# Patient Record
Sex: MALE | Race: BLACK OR AFRICAN AMERICAN | Employment: OTHER | URBAN - METROPOLITAN AREA
[De-identification: names, ages, dates, MRNs, and addresses within clinical notes are randomized per-mention and may not be internally consistent; named-entity substitution may affect disease eponyms.]

---

## 2017-02-06 ENCOUNTER — OFFICE VISIT (OUTPATIENT)
Dept: INTERNAL MEDICINE CLINIC | Age: 67
End: 2017-02-06

## 2017-02-06 VITALS
DIASTOLIC BLOOD PRESSURE: 64 MMHG | OXYGEN SATURATION: 98 % | WEIGHT: 159 LBS | HEIGHT: 71 IN | SYSTOLIC BLOOD PRESSURE: 124 MMHG | BODY MASS INDEX: 22.26 KG/M2 | TEMPERATURE: 98.1 F | HEART RATE: 100 BPM

## 2017-02-06 DIAGNOSIS — M79.641 HAND PAIN, RIGHT: ICD-10-CM

## 2017-02-06 DIAGNOSIS — M54.41 CHRONIC BILATERAL LOW BACK PAIN WITH RIGHT-SIDED SCIATICA: ICD-10-CM

## 2017-02-06 DIAGNOSIS — I10 ESSENTIAL HYPERTENSION: Primary | ICD-10-CM

## 2017-02-06 DIAGNOSIS — G89.29 CHRONIC BILATERAL LOW BACK PAIN WITH RIGHT-SIDED SCIATICA: ICD-10-CM

## 2017-02-06 DIAGNOSIS — D50.9 IRON DEFICIENCY ANEMIA, UNSPECIFIED IRON DEFICIENCY ANEMIA TYPE: ICD-10-CM

## 2017-02-06 DIAGNOSIS — M54.2 NECK PAIN: ICD-10-CM

## 2017-02-06 PROCEDURE — 99214 OFFICE O/P EST MOD 30 MIN: CPT | Performed by: INTERNAL MEDICINE

## 2017-02-06 RX ORDER — VALSARTAN AND HYDROCHLOROTHIAZIDE 320; 25 MG/1; MG/1
TABLET, FILM COATED ORAL
Qty: 30 TABLET | Refills: 5 | Status: SHIPPED | OUTPATIENT
Start: 2017-02-06 | End: 2017-05-16 | Stop reason: SDUPTHER

## 2017-02-06 RX ORDER — VALSARTAN AND HYDROCHLOROTHIAZIDE 320; 25 MG/1; MG/1
TABLET, FILM COATED ORAL
Qty: 30 TABLET | Refills: 5 | Status: SHIPPED | OUTPATIENT
Start: 2017-02-06 | End: 2017-02-06 | Stop reason: SDUPTHER

## 2017-02-06 RX ORDER — DIAZEPAM 10 MG/1
10 TABLET ORAL EVERY 8 HOURS PRN
Qty: 60 TABLET | Refills: 5 | Status: SHIPPED | OUTPATIENT
Start: 2017-02-06 | End: 2017-08-10 | Stop reason: SDUPTHER

## 2017-02-06 ASSESSMENT — ENCOUNTER SYMPTOMS
EYES NEGATIVE: 1
GASTROINTESTINAL NEGATIVE: 1
RESPIRATORY NEGATIVE: 1

## 2017-02-07 LAB
BASOPHILS ABSOLUTE: 0.1 K/UL (ref 0–0.2)
BASOPHILS RELATIVE PERCENT: 1 %
EOSINOPHILS ABSOLUTE: 0.2 K/UL (ref 0–0.6)
EOSINOPHILS RELATIVE PERCENT: 2.3 %
HCT VFR BLD CALC: 42.6 % (ref 40.5–52.5)
HEMOGLOBIN: 14.2 G/DL (ref 13.5–17.5)
LYMPHOCYTES ABSOLUTE: 3.4 K/UL (ref 1–5.1)
LYMPHOCYTES RELATIVE PERCENT: 51.2 %
MCH RBC QN AUTO: 31.4 PG (ref 26–34)
MCHC RBC AUTO-ENTMCNC: 33.4 G/DL (ref 31–36)
MCV RBC AUTO: 94.3 FL (ref 80–100)
MONOCYTES ABSOLUTE: 0.8 K/UL (ref 0–1.3)
MONOCYTES RELATIVE PERCENT: 12.2 %
NEUTROPHILS ABSOLUTE: 2.2 K/UL (ref 1.7–7.7)
NEUTROPHILS RELATIVE PERCENT: 33.3 %
PDW BLD-RTO: 13.3 % (ref 12.4–15.4)
PLATELET # BLD: 209 K/UL (ref 135–450)
PMV BLD AUTO: 11.1 FL (ref 5–10.5)
RBC # BLD: 4.52 M/UL (ref 4.2–5.9)
WBC # BLD: 6.7 K/UL (ref 4–11)

## 2017-05-10 ENCOUNTER — OFFICE VISIT (OUTPATIENT)
Dept: INTERNAL MEDICINE CLINIC | Age: 67
End: 2017-05-10

## 2017-05-10 VITALS
HEART RATE: 115 BPM | WEIGHT: 155 LBS | DIASTOLIC BLOOD PRESSURE: 66 MMHG | SYSTOLIC BLOOD PRESSURE: 100 MMHG | BODY MASS INDEX: 21.7 KG/M2 | OXYGEN SATURATION: 97 % | TEMPERATURE: 98.5 F | HEIGHT: 71 IN

## 2017-05-10 DIAGNOSIS — G89.29 CHRONIC BILATERAL LOW BACK PAIN WITH RIGHT-SIDED SCIATICA: ICD-10-CM

## 2017-05-10 DIAGNOSIS — R79.89 ELEVATED LFTS: ICD-10-CM

## 2017-05-10 DIAGNOSIS — M54.41 CHRONIC BILATERAL LOW BACK PAIN WITH RIGHT-SIDED SCIATICA: ICD-10-CM

## 2017-05-10 DIAGNOSIS — M54.2 NECK PAIN: ICD-10-CM

## 2017-05-10 DIAGNOSIS — Z23 NEED FOR VACCINATION: Primary | ICD-10-CM

## 2017-05-10 DIAGNOSIS — N18.2 CKD (CHRONIC KIDNEY DISEASE), STAGE 2 (MILD): ICD-10-CM

## 2017-05-10 DIAGNOSIS — K59.01 SLOW TRANSIT CONSTIPATION: ICD-10-CM

## 2017-05-10 DIAGNOSIS — M79.641 HAND PAIN, RIGHT: ICD-10-CM

## 2017-05-10 DIAGNOSIS — I10 ESSENTIAL HYPERTENSION: ICD-10-CM

## 2017-05-10 LAB
ALBUMIN SERPL-MCNC: 4.2 G/DL (ref 3.4–5)
ANION GAP SERPL CALCULATED.3IONS-SCNC: 17 MMOL/L (ref 3–16)
BUN BLDV-MCNC: 37 MG/DL (ref 7–20)
CALCIUM SERPL-MCNC: 9.7 MG/DL (ref 8.3–10.6)
CHLORIDE BLD-SCNC: 104 MMOL/L (ref 99–110)
CO2: 22 MMOL/L (ref 21–32)
CREAT SERPL-MCNC: 2 MG/DL (ref 0.8–1.3)
GFR AFRICAN AMERICAN: 40
GFR NON-AFRICAN AMERICAN: 33
GLUCOSE BLD-MCNC: 94 MG/DL (ref 70–99)
PHOSPHORUS: 3.3 MG/DL (ref 2.5–4.9)
POTASSIUM SERPL-SCNC: 4.3 MMOL/L (ref 3.5–5.1)
SODIUM BLD-SCNC: 143 MMOL/L (ref 136–145)

## 2017-05-10 PROCEDURE — 90670 PCV13 VACCINE IM: CPT | Performed by: INTERNAL MEDICINE

## 2017-05-10 PROCEDURE — 96372 THER/PROPH/DIAG INJ SC/IM: CPT | Performed by: INTERNAL MEDICINE

## 2017-05-10 PROCEDURE — G0009 ADMIN PNEUMOCOCCAL VACCINE: HCPCS | Performed by: INTERNAL MEDICINE

## 2017-05-10 PROCEDURE — 99214 OFFICE O/P EST MOD 30 MIN: CPT | Performed by: INTERNAL MEDICINE

## 2017-05-10 RX ORDER — TRIAMCINOLONE ACETONIDE 40 MG/ML
40 INJECTION, SUSPENSION INTRA-ARTICULAR; INTRAMUSCULAR ONCE
Status: COMPLETED | OUTPATIENT
Start: 2017-05-10 | End: 2017-05-10

## 2017-05-10 RX ADMIN — TRIAMCINOLONE ACETONIDE 40 MG: 40 INJECTION, SUSPENSION INTRA-ARTICULAR; INTRAMUSCULAR at 15:06

## 2017-05-10 ASSESSMENT — PATIENT HEALTH QUESTIONNAIRE - PHQ9
2. FEELING DOWN, DEPRESSED OR HOPELESS: 0
SUM OF ALL RESPONSES TO PHQ9 QUESTIONS 1 & 2: 0
1. LITTLE INTEREST OR PLEASURE IN DOING THINGS: 0
SUM OF ALL RESPONSES TO PHQ QUESTIONS 1-9: 0

## 2017-05-11 LAB — HEPATITIS C ANTIBODY INTERPRETATION: REACTIVE

## 2017-05-17 RX ORDER — VALSARTAN AND HYDROCHLOROTHIAZIDE 320; 25 MG/1; MG/1
TABLET, FILM COATED ORAL
Qty: 30 TABLET | Refills: 5 | Status: SHIPPED | OUTPATIENT
Start: 2017-05-17 | End: 2019-01-15 | Stop reason: ALTCHOICE

## 2017-06-08 ENCOUNTER — TELEPHONE (OUTPATIENT)
Dept: INTERNAL MEDICINE CLINIC | Age: 67
End: 2017-06-08

## 2017-08-10 ENCOUNTER — TELEPHONE (OUTPATIENT)
Dept: INTERNAL MEDICINE CLINIC | Age: 67
End: 2017-08-10

## 2017-08-14 RX ORDER — DIAZEPAM 10 MG/1
10 TABLET ORAL EVERY 8 HOURS PRN
Qty: 60 TABLET | Refills: 5 | Status: SHIPPED | OUTPATIENT
Start: 2017-08-14 | End: 2018-02-13 | Stop reason: SDUPTHER

## 2017-08-15 ENCOUNTER — TELEPHONE (OUTPATIENT)
Dept: INTERNAL MEDICINE CLINIC | Age: 67
End: 2017-08-15

## 2017-09-07 ENCOUNTER — OFFICE VISIT (OUTPATIENT)
Dept: INTERNAL MEDICINE CLINIC | Age: 67
End: 2017-09-07

## 2017-09-07 VITALS
SYSTOLIC BLOOD PRESSURE: 110 MMHG | TEMPERATURE: 98.1 F | WEIGHT: 158.8 LBS | DIASTOLIC BLOOD PRESSURE: 60 MMHG | BODY MASS INDEX: 22.23 KG/M2 | HEART RATE: 104 BPM | HEIGHT: 71 IN | OXYGEN SATURATION: 97 %

## 2017-09-07 DIAGNOSIS — B18.2 HEP C W/O COMA, CHRONIC (HCC): ICD-10-CM

## 2017-09-07 DIAGNOSIS — M79.641 HAND PAIN, RIGHT: ICD-10-CM

## 2017-09-07 DIAGNOSIS — R79.89 ELEVATED LFTS: Primary | ICD-10-CM

## 2017-09-07 DIAGNOSIS — M54.2 NECK PAIN: ICD-10-CM

## 2017-09-07 DIAGNOSIS — I10 ESSENTIAL HYPERTENSION: ICD-10-CM

## 2017-09-07 LAB
A/G RATIO: 1.3 (ref 1.1–2.2)
ALBUMIN SERPL-MCNC: 4 G/DL (ref 3.4–5)
ALP BLD-CCNC: 136 U/L (ref 40–129)
ALT SERPL-CCNC: 25 U/L (ref 10–40)
ANION GAP SERPL CALCULATED.3IONS-SCNC: 19 MMOL/L (ref 3–16)
AST SERPL-CCNC: 33 U/L (ref 15–37)
BILIRUB SERPL-MCNC: <0.2 MG/DL (ref 0–1)
BUN BLDV-MCNC: 36 MG/DL (ref 7–20)
CALCIUM SERPL-MCNC: 9.6 MG/DL (ref 8.3–10.6)
CHLORIDE BLD-SCNC: 104 MMOL/L (ref 99–110)
CO2: 19 MMOL/L (ref 21–32)
CREAT SERPL-MCNC: 1.5 MG/DL (ref 0.8–1.3)
GFR AFRICAN AMERICAN: 56
GFR NON-AFRICAN AMERICAN: 46
GLOBULIN: 3.1 G/DL
GLUCOSE BLD-MCNC: 129 MG/DL (ref 70–99)
POTASSIUM SERPL-SCNC: 4 MMOL/L (ref 3.5–5.1)
SODIUM BLD-SCNC: 142 MMOL/L (ref 136–145)
TOTAL PROTEIN: 7.1 G/DL (ref 6.4–8.2)

## 2017-09-07 PROCEDURE — 99214 OFFICE O/P EST MOD 30 MIN: CPT | Performed by: INTERNAL MEDICINE

## 2017-09-07 ASSESSMENT — PATIENT HEALTH QUESTIONNAIRE - PHQ9
SUM OF ALL RESPONSES TO PHQ QUESTIONS 1-9: 0
SUM OF ALL RESPONSES TO PHQ9 QUESTIONS 1 & 2: 0
2. FEELING DOWN, DEPRESSED OR HOPELESS: 0
1. LITTLE INTEREST OR PLEASURE IN DOING THINGS: 0

## 2017-09-07 ASSESSMENT — ENCOUNTER SYMPTOMS
EYES NEGATIVE: 1
GASTROINTESTINAL NEGATIVE: 1
RESPIRATORY NEGATIVE: 1

## 2017-12-27 ENCOUNTER — OFFICE VISIT (OUTPATIENT)
Dept: INTERNAL MEDICINE CLINIC | Age: 67
End: 2017-12-27

## 2017-12-27 VITALS
DIASTOLIC BLOOD PRESSURE: 78 MMHG | WEIGHT: 164 LBS | HEIGHT: 71 IN | SYSTOLIC BLOOD PRESSURE: 104 MMHG | BODY MASS INDEX: 22.96 KG/M2 | HEART RATE: 118 BPM | OXYGEN SATURATION: 92 %

## 2017-12-27 DIAGNOSIS — R53.83 FATIGUE, UNSPECIFIED TYPE: ICD-10-CM

## 2017-12-27 DIAGNOSIS — Z23 NEED FOR VACCINATION: ICD-10-CM

## 2017-12-27 DIAGNOSIS — M54.50 ACUTE BILATERAL LOW BACK PAIN WITHOUT SCIATICA: ICD-10-CM

## 2017-12-27 DIAGNOSIS — G62.9 NEUROPATHY: ICD-10-CM

## 2017-12-27 DIAGNOSIS — I10 ESSENTIAL HYPERTENSION: ICD-10-CM

## 2017-12-27 DIAGNOSIS — M79.641 HAND PAIN, RIGHT: ICD-10-CM

## 2017-12-27 DIAGNOSIS — M54.2 NECK PAIN: Primary | ICD-10-CM

## 2017-12-27 LAB
A/G RATIO: 1 (ref 1.1–2.2)
ALBUMIN SERPL-MCNC: 4.3 G/DL (ref 3.4–5)
ALP BLD-CCNC: 116 U/L (ref 40–129)
ALT SERPL-CCNC: 35 U/L (ref 10–40)
ANION GAP SERPL CALCULATED.3IONS-SCNC: 17 MMOL/L (ref 3–16)
AST SERPL-CCNC: 51 U/L (ref 15–37)
BILIRUB SERPL-MCNC: 0.6 MG/DL (ref 0–1)
BUN BLDV-MCNC: 41 MG/DL (ref 7–20)
CALCIUM SERPL-MCNC: 10.2 MG/DL (ref 8.3–10.6)
CHLORIDE BLD-SCNC: 104 MMOL/L (ref 99–110)
CO2: 22 MMOL/L (ref 21–32)
CREAT SERPL-MCNC: 2.3 MG/DL (ref 0.8–1.3)
GFR AFRICAN AMERICAN: 34
GFR NON-AFRICAN AMERICAN: 28
GLOBULIN: 4.1 G/DL
GLUCOSE BLD-MCNC: 110 MG/DL (ref 70–99)
POTASSIUM SERPL-SCNC: 4.3 MMOL/L (ref 3.5–5.1)
SODIUM BLD-SCNC: 143 MMOL/L (ref 136–145)
TOTAL PROTEIN: 8.4 G/DL (ref 6.4–8.2)

## 2017-12-27 PROCEDURE — 3017F COLORECTAL CA SCREEN DOC REV: CPT | Performed by: INTERNAL MEDICINE

## 2017-12-27 PROCEDURE — 4040F PNEUMOC VAC/ADMIN/RCVD: CPT | Performed by: INTERNAL MEDICINE

## 2017-12-27 PROCEDURE — G0008 ADMIN INFLUENZA VIRUS VAC: HCPCS | Performed by: INTERNAL MEDICINE

## 2017-12-27 PROCEDURE — 1123F ACP DISCUSS/DSCN MKR DOCD: CPT | Performed by: INTERNAL MEDICINE

## 2017-12-27 PROCEDURE — 96372 THER/PROPH/DIAG INJ SC/IM: CPT | Performed by: INTERNAL MEDICINE

## 2017-12-27 PROCEDURE — 99214 OFFICE O/P EST MOD 30 MIN: CPT | Performed by: INTERNAL MEDICINE

## 2017-12-27 PROCEDURE — G8420 CALC BMI NORM PARAMETERS: HCPCS | Performed by: INTERNAL MEDICINE

## 2017-12-27 PROCEDURE — 4004F PT TOBACCO SCREEN RCVD TLK: CPT | Performed by: INTERNAL MEDICINE

## 2017-12-27 PROCEDURE — G8484 FLU IMMUNIZE NO ADMIN: HCPCS | Performed by: INTERNAL MEDICINE

## 2017-12-27 PROCEDURE — G8427 DOCREV CUR MEDS BY ELIG CLIN: HCPCS | Performed by: INTERNAL MEDICINE

## 2017-12-27 PROCEDURE — 90662 IIV NO PRSV INCREASED AG IM: CPT | Performed by: INTERNAL MEDICINE

## 2017-12-27 RX ORDER — NAPROXEN SODIUM 550 MG/1
550 TABLET ORAL 2 TIMES DAILY WITH MEALS
Qty: 60 TABLET | Refills: 0 | Status: SHIPPED | OUTPATIENT
Start: 2017-12-27 | End: 2018-06-28

## 2017-12-27 ASSESSMENT — ENCOUNTER SYMPTOMS
GASTROINTESTINAL NEGATIVE: 1
EYES NEGATIVE: 1
BACK PAIN: 1
RESPIRATORY NEGATIVE: 1

## 2017-12-27 NOTE — PROGRESS NOTES
Vaccine Information Sheet, \"Influenza - Inactivated\"  given to Reyna Knightrs., or parent/legal guardian of  Reyna Weaver. and verbalized understanding. Patient responses:    Have you ever had a reaction to a flu vaccine? NO  Are you able to eat eggs without adverse effects? NO  Do you have any current illness? NO  Have you ever had Guillian [place] Syndrome? NO    Flu vaccine given per order. Please see immunization tab.

## 2018-02-13 ENCOUNTER — TELEPHONE (OUTPATIENT)
Dept: INTERNAL MEDICINE CLINIC | Age: 68
End: 2018-02-13

## 2018-02-13 RX ORDER — DIAZEPAM 10 MG/1
TABLET ORAL
Qty: 60 TABLET | Refills: 4 | Status: SHIPPED | OUTPATIENT
Start: 2018-02-13 | End: 2018-03-15

## 2018-02-14 ENCOUNTER — TELEPHONE (OUTPATIENT)
Dept: INTERNAL MEDICINE CLINIC | Age: 68
End: 2018-02-14

## 2018-02-14 NOTE — TELEPHONE ENCOUNTER
spoke with haim at pharmacy he said pt needs prescription refilled for diazapram pts number is [phone]

## 2018-02-19 RX ORDER — VALSARTAN AND HYDROCHLOROTHIAZIDE 320; 25 MG/1; MG/1
TABLET, FILM COATED ORAL
Qty: 30 TABLET | Refills: 5 | Status: SHIPPED | OUTPATIENT
Start: 2018-02-19 | End: 2018-06-28 | Stop reason: SDUPTHER

## 2018-03-27 ENCOUNTER — OFFICE VISIT (OUTPATIENT)
Dept: INTERNAL MEDICINE CLINIC | Age: 68
End: 2018-03-27

## 2018-03-27 VITALS
HEIGHT: 71 IN | BODY MASS INDEX: 22.96 KG/M2 | DIASTOLIC BLOOD PRESSURE: 84 MMHG | OXYGEN SATURATION: 97 % | HEART RATE: 87 BPM | SYSTOLIC BLOOD PRESSURE: 124 MMHG | WEIGHT: 164 LBS

## 2018-03-27 DIAGNOSIS — Z12.5 PROSTATE CANCER SCREENING: ICD-10-CM

## 2018-03-27 DIAGNOSIS — M54.41 CHRONIC BILATERAL LOW BACK PAIN WITH RIGHT-SIDED SCIATICA: ICD-10-CM

## 2018-03-27 DIAGNOSIS — D50.9 IRON DEFICIENCY ANEMIA, UNSPECIFIED IRON DEFICIENCY ANEMIA TYPE: ICD-10-CM

## 2018-03-27 DIAGNOSIS — G62.9 NEUROPATHY: ICD-10-CM

## 2018-03-27 DIAGNOSIS — M54.2 NECK PAIN: ICD-10-CM

## 2018-03-27 DIAGNOSIS — R79.89 ELEVATED LFTS: ICD-10-CM

## 2018-03-27 DIAGNOSIS — G89.29 CHRONIC BILATERAL LOW BACK PAIN WITH RIGHT-SIDED SCIATICA: ICD-10-CM

## 2018-03-27 DIAGNOSIS — I10 ESSENTIAL HYPERTENSION: Primary | ICD-10-CM

## 2018-03-27 LAB
A/G RATIO: 1.3 (ref 1.1–2.2)
ALBUMIN SERPL-MCNC: 4 G/DL (ref 3.4–5)
ALP BLD-CCNC: 149 U/L (ref 40–129)
ALT SERPL-CCNC: 21 U/L (ref 10–40)
ANION GAP SERPL CALCULATED.3IONS-SCNC: 15 MMOL/L (ref 3–16)
AST SERPL-CCNC: 27 U/L (ref 15–37)
BILIRUB SERPL-MCNC: <0.2 MG/DL (ref 0–1)
BUN BLDV-MCNC: 45 MG/DL (ref 7–20)
CALCIUM SERPL-MCNC: 8.8 MG/DL (ref 8.3–10.6)
CHLORIDE BLD-SCNC: 105 MMOL/L (ref 99–110)
CO2: 22 MMOL/L (ref 21–32)
CREAT SERPL-MCNC: 1.8 MG/DL (ref 0.8–1.3)
GFR AFRICAN AMERICAN: 45
GFR NON-AFRICAN AMERICAN: 38
GLOBULIN: 3 G/DL
GLUCOSE BLD-MCNC: 126 MG/DL (ref 70–99)
HCT VFR BLD CALC: 43.2 % (ref 40.5–52.5)
HEMOGLOBIN: 14.5 G/DL (ref 13.5–17.5)
MCH RBC QN AUTO: 32.2 PG (ref 26–34)
MCHC RBC AUTO-ENTMCNC: 33.7 G/DL (ref 31–36)
MCV RBC AUTO: 95.6 FL (ref 80–100)
PDW BLD-RTO: 12.7 % (ref 12.4–15.4)
PLATELET # BLD: 179 K/UL (ref 135–450)
PMV BLD AUTO: 11.7 FL (ref 5–10.5)
POTASSIUM SERPL-SCNC: 4.2 MMOL/L (ref 3.5–5.1)
PROSTATE SPECIFIC ANTIGEN: 0.95 NG/ML (ref 0–4)
RBC # BLD: 4.51 M/UL (ref 4.2–5.9)
SODIUM BLD-SCNC: 142 MMOL/L (ref 136–145)
TOTAL PROTEIN: 7 G/DL (ref 6.4–8.2)
WBC # BLD: 5.1 K/UL (ref 4–11)

## 2018-03-27 PROCEDURE — G8427 DOCREV CUR MEDS BY ELIG CLIN: HCPCS | Performed by: INTERNAL MEDICINE

## 2018-03-27 PROCEDURE — 3017F COLORECTAL CA SCREEN DOC REV: CPT | Performed by: INTERNAL MEDICINE

## 2018-03-27 PROCEDURE — 4004F PT TOBACCO SCREEN RCVD TLK: CPT | Performed by: INTERNAL MEDICINE

## 2018-03-27 PROCEDURE — 99214 OFFICE O/P EST MOD 30 MIN: CPT | Performed by: INTERNAL MEDICINE

## 2018-03-27 PROCEDURE — G8482 FLU IMMUNIZE ORDER/ADMIN: HCPCS | Performed by: INTERNAL MEDICINE

## 2018-03-27 PROCEDURE — 1123F ACP DISCUSS/DSCN MKR DOCD: CPT | Performed by: INTERNAL MEDICINE

## 2018-03-27 PROCEDURE — 4040F PNEUMOC VAC/ADMIN/RCVD: CPT | Performed by: INTERNAL MEDICINE

## 2018-03-27 PROCEDURE — G8420 CALC BMI NORM PARAMETERS: HCPCS | Performed by: INTERNAL MEDICINE

## 2018-03-27 PROCEDURE — 96372 THER/PROPH/DIAG INJ SC/IM: CPT | Performed by: INTERNAL MEDICINE

## 2018-03-27 RX ORDER — CYANOCOBALAMIN 1000 UG/ML
1000 INJECTION INTRAMUSCULAR; SUBCUTANEOUS ONCE
Status: COMPLETED | OUTPATIENT
Start: 2018-03-27 | End: 2018-03-27

## 2018-03-27 RX ADMIN — CYANOCOBALAMIN 1000 MCG: 1000 INJECTION INTRAMUSCULAR; SUBCUTANEOUS at 15:44

## 2018-03-27 ASSESSMENT — ENCOUNTER SYMPTOMS
RESPIRATORY NEGATIVE: 1
GASTROINTESTINAL NEGATIVE: 1
EYES NEGATIVE: 1

## 2018-03-27 NOTE — PROGRESS NOTES
murmur heard. Pulmonary/Chest: Effort normal and breath sounds normal. No respiratory distress. He has no wheezes. He has no rales. Abdominal: Soft. Bowel sounds are normal.   Musculoskeletal: Normal range of motion. Neurological: He is alert and oriented to person, place, and time. Skin: Skin is warm and dry. Psychiatric: He has a normal mood and affect.        Assessment:    hypertension  Stable    Hyperlipidemia  On diet    Chronic anemia  Stable    Chronic neck pain    Acute sinusitis      Plan:      Continue meds    Refills    Labs    B!@    toradol 60mg IM    Return in 3 months

## 2018-05-21 ENCOUNTER — TELEPHONE (OUTPATIENT)
Dept: INTERNAL MEDICINE CLINIC | Age: 68
End: 2018-05-21

## 2018-05-22 ENCOUNTER — TELEPHONE (OUTPATIENT)
Dept: INTERNAL MEDICINE CLINIC | Age: 68
End: 2018-05-22

## 2018-05-22 RX ORDER — DIPHENOXYLATE HYDROCHLORIDE AND ATROPINE SULFATE 2.5; .025 MG/1; MG/1
1 TABLET ORAL 4 TIMES DAILY PRN
Qty: 30 TABLET | Refills: 0 | Status: SHIPPED | OUTPATIENT
Start: 2018-05-22 | End: 2018-06-01

## 2018-06-28 ENCOUNTER — OFFICE VISIT (OUTPATIENT)
Dept: INTERNAL MEDICINE CLINIC | Age: 68
End: 2018-06-28

## 2018-06-28 VITALS
SYSTOLIC BLOOD PRESSURE: 100 MMHG | HEIGHT: 71 IN | BODY MASS INDEX: 21.84 KG/M2 | OXYGEN SATURATION: 98 % | WEIGHT: 156 LBS | DIASTOLIC BLOOD PRESSURE: 74 MMHG | HEART RATE: 95 BPM

## 2018-06-28 DIAGNOSIS — F51.01 PRIMARY INSOMNIA: ICD-10-CM

## 2018-06-28 DIAGNOSIS — M54.50 ACUTE BILATERAL LOW BACK PAIN WITHOUT SCIATICA: ICD-10-CM

## 2018-06-28 DIAGNOSIS — I10 ESSENTIAL HYPERTENSION: Primary | ICD-10-CM

## 2018-06-28 DIAGNOSIS — M54.2 NECK PAIN: ICD-10-CM

## 2018-06-28 DIAGNOSIS — F17.200 CURRENT SMOKER: ICD-10-CM

## 2018-06-28 DIAGNOSIS — R53.83 FATIGUE, UNSPECIFIED TYPE: ICD-10-CM

## 2018-06-28 LAB
A/G RATIO: 1.1 (ref 1.1–2.2)
ALBUMIN SERPL-MCNC: 4 G/DL (ref 3.4–5)
ALP BLD-CCNC: 145 U/L (ref 40–129)
ALT SERPL-CCNC: <5 U/L (ref 10–40)
ANION GAP SERPL CALCULATED.3IONS-SCNC: 18 MMOL/L (ref 3–16)
AST SERPL-CCNC: <5 U/L (ref 15–37)
BILIRUB SERPL-MCNC: 0.3 MG/DL (ref 0–1)
BUN BLDV-MCNC: 45 MG/DL (ref 7–20)
CALCIUM SERPL-MCNC: 9.6 MG/DL (ref 8.3–10.6)
CHLORIDE BLD-SCNC: 101 MMOL/L (ref 99–110)
CO2: 22 MMOL/L (ref 21–32)
CREAT SERPL-MCNC: 1.9 MG/DL (ref 0.8–1.3)
GFR AFRICAN AMERICAN: 43
GFR NON-AFRICAN AMERICAN: 35
GLOBULIN: 3.5 G/DL
GLUCOSE BLD-MCNC: 102 MG/DL (ref 70–99)
POTASSIUM SERPL-SCNC: 4.5 MMOL/L (ref 3.5–5.1)
SODIUM BLD-SCNC: 141 MMOL/L (ref 136–145)
TOTAL PROTEIN: 7.5 G/DL (ref 6.4–8.2)

## 2018-06-28 PROCEDURE — G8427 DOCREV CUR MEDS BY ELIG CLIN: HCPCS | Performed by: INTERNAL MEDICINE

## 2018-06-28 PROCEDURE — 1123F ACP DISCUSS/DSCN MKR DOCD: CPT | Performed by: INTERNAL MEDICINE

## 2018-06-28 PROCEDURE — 4004F PT TOBACCO SCREEN RCVD TLK: CPT | Performed by: INTERNAL MEDICINE

## 2018-06-28 PROCEDURE — 4040F PNEUMOC VAC/ADMIN/RCVD: CPT | Performed by: INTERNAL MEDICINE

## 2018-06-28 PROCEDURE — G8420 CALC BMI NORM PARAMETERS: HCPCS | Performed by: INTERNAL MEDICINE

## 2018-06-28 PROCEDURE — 3017F COLORECTAL CA SCREEN DOC REV: CPT | Performed by: INTERNAL MEDICINE

## 2018-06-28 PROCEDURE — 96372 THER/PROPH/DIAG INJ SC/IM: CPT | Performed by: INTERNAL MEDICINE

## 2018-06-28 PROCEDURE — 99214 OFFICE O/P EST MOD 30 MIN: CPT | Performed by: INTERNAL MEDICINE

## 2018-06-28 RX ORDER — KETOROLAC TROMETHAMINE 30 MG/ML
30 INJECTION, SOLUTION INTRAMUSCULAR; INTRAVENOUS ONCE
Status: COMPLETED | OUTPATIENT
Start: 2018-06-28 | End: 2018-06-28

## 2018-06-28 RX ORDER — DIAZEPAM 10 MG/1
TABLET ORAL
Refills: 4 | COMMUNITY
Start: 2018-06-12 | End: 2018-07-16 | Stop reason: SDUPTHER

## 2018-06-28 RX ORDER — SILDENAFIL 100 MG/1
100 TABLET, FILM COATED ORAL PRN
Qty: 3 TABLET | Refills: 3 | Status: SHIPPED | OUTPATIENT
Start: 2018-06-28 | End: 2019-04-16 | Stop reason: SDUPTHER

## 2018-06-28 RX ORDER — NICOTINE 21 MG/24HR
1 PATCH, TRANSDERMAL 24 HOURS TRANSDERMAL EVERY 24 HOURS
Qty: 30 PATCH | Refills: 3 | Status: SHIPPED | OUTPATIENT
Start: 2018-06-28 | End: 2020-01-16 | Stop reason: SDUPTHER

## 2018-06-28 RX ORDER — VALSARTAN AND HYDROCHLOROTHIAZIDE 320; 25 MG/1; MG/1
TABLET, FILM COATED ORAL
Qty: 30 TABLET | Refills: 5 | Status: SHIPPED | OUTPATIENT
Start: 2018-06-28 | End: 2019-01-15 | Stop reason: ALTCHOICE

## 2018-06-28 RX ADMIN — KETOROLAC TROMETHAMINE 30 MG: 30 INJECTION, SOLUTION INTRAMUSCULAR; INTRAVENOUS at 14:19

## 2018-06-28 ASSESSMENT — ENCOUNTER SYMPTOMS
RESPIRATORY NEGATIVE: 1
GASTROINTESTINAL NEGATIVE: 1
EYES NEGATIVE: 1

## 2018-06-28 ASSESSMENT — PATIENT HEALTH QUESTIONNAIRE - PHQ9
SUM OF ALL RESPONSES TO PHQ9 QUESTIONS 1 & 2: 0
2. FEELING DOWN, DEPRESSED OR HOPELESS: 0
SUM OF ALL RESPONSES TO PHQ QUESTIONS 1-9: 0
1. LITTLE INTEREST OR PLEASURE IN DOING THINGS: 0

## 2018-07-16 DIAGNOSIS — F51.01 PRIMARY INSOMNIA: ICD-10-CM

## 2018-07-16 DIAGNOSIS — F51.01 PRIMARY INSOMNIA: Primary | ICD-10-CM

## 2018-07-16 RX ORDER — DIAZEPAM 10 MG/1
10 TABLET ORAL EVERY 12 HOURS PRN
Qty: 60 TABLET | Refills: 5 | Status: SHIPPED | OUTPATIENT
Start: 2018-07-16 | End: 2018-07-16 | Stop reason: SDUPTHER

## 2018-07-17 ENCOUNTER — TELEPHONE (OUTPATIENT)
Dept: INTERNAL MEDICINE CLINIC | Age: 68
End: 2018-07-17

## 2018-07-17 RX ORDER — DIAZEPAM 10 MG/1
TABLET ORAL
Qty: 60 TABLET | Refills: 3 | Status: SHIPPED | OUTPATIENT
Start: 2018-07-17 | End: 2018-07-23 | Stop reason: SDUPTHER

## 2018-07-23 ENCOUNTER — TELEPHONE (OUTPATIENT)
Dept: INTERNAL MEDICINE CLINIC | Age: 68
End: 2018-07-23

## 2018-07-23 DIAGNOSIS — F51.01 PRIMARY INSOMNIA: ICD-10-CM

## 2018-07-23 DIAGNOSIS — F41.1 GENERALIZED ANXIETY DISORDER: ICD-10-CM

## 2018-07-23 DIAGNOSIS — G62.9 NEUROPATHY: Primary | ICD-10-CM

## 2018-07-23 RX ORDER — LOSARTAN POTASSIUM AND HYDROCHLOROTHIAZIDE 25; 100 MG/1; MG/1
1 TABLET ORAL DAILY
Qty: 30 TABLET | Refills: 3 | Status: SHIPPED | OUTPATIENT
Start: 2018-07-23 | End: 2018-11-17 | Stop reason: SDUPTHER

## 2018-07-23 RX ORDER — DIAZEPAM 10 MG/1
10 TABLET ORAL EVERY 12 HOURS PRN
Qty: 60 TABLET | Refills: 3 | Status: SHIPPED | OUTPATIENT
Start: 2018-07-23 | End: 2018-08-22

## 2018-07-23 NOTE — TELEPHONE ENCOUNTER
Pharmacist calling requesting an alternative be prescribed     By the  For Briseyda [place] 320-25 MG

## 2018-08-14 ENCOUNTER — TELEPHONE (OUTPATIENT)
Dept: INTERNAL MEDICINE CLINIC | Age: 68
End: 2018-08-14

## 2018-10-02 ENCOUNTER — OFFICE VISIT (OUTPATIENT)
Dept: INTERNAL MEDICINE CLINIC | Age: 68
End: 2018-10-02
Payer: MEDICARE

## 2018-10-02 VITALS
OXYGEN SATURATION: 99 % | DIASTOLIC BLOOD PRESSURE: 60 MMHG | BODY MASS INDEX: 21.42 KG/M2 | HEART RATE: 90 BPM | SYSTOLIC BLOOD PRESSURE: 96 MMHG | HEIGHT: 71 IN | WEIGHT: 153 LBS

## 2018-10-02 DIAGNOSIS — M54.2 NECK PAIN: ICD-10-CM

## 2018-10-02 DIAGNOSIS — G89.29 CHRONIC BILATERAL LOW BACK PAIN WITH RIGHT-SIDED SCIATICA: ICD-10-CM

## 2018-10-02 DIAGNOSIS — D50.9 IRON DEFICIENCY ANEMIA, UNSPECIFIED IRON DEFICIENCY ANEMIA TYPE: ICD-10-CM

## 2018-10-02 DIAGNOSIS — I10 ESSENTIAL HYPERTENSION: Primary | ICD-10-CM

## 2018-10-02 DIAGNOSIS — M54.41 CHRONIC BILATERAL LOW BACK PAIN WITH RIGHT-SIDED SCIATICA: ICD-10-CM

## 2018-10-02 DIAGNOSIS — M79.641 HAND PAIN, RIGHT: ICD-10-CM

## 2018-10-02 PROCEDURE — G8484 FLU IMMUNIZE NO ADMIN: HCPCS | Performed by: INTERNAL MEDICINE

## 2018-10-02 PROCEDURE — 4040F PNEUMOC VAC/ADMIN/RCVD: CPT | Performed by: INTERNAL MEDICINE

## 2018-10-02 PROCEDURE — G8427 DOCREV CUR MEDS BY ELIG CLIN: HCPCS | Performed by: INTERNAL MEDICINE

## 2018-10-02 PROCEDURE — 1123F ACP DISCUSS/DSCN MKR DOCD: CPT | Performed by: INTERNAL MEDICINE

## 2018-10-02 PROCEDURE — 4004F PT TOBACCO SCREEN RCVD TLK: CPT | Performed by: INTERNAL MEDICINE

## 2018-10-02 PROCEDURE — G8420 CALC BMI NORM PARAMETERS: HCPCS | Performed by: INTERNAL MEDICINE

## 2018-10-02 PROCEDURE — 99214 OFFICE O/P EST MOD 30 MIN: CPT | Performed by: INTERNAL MEDICINE

## 2018-10-02 PROCEDURE — 1101F PT FALLS ASSESS-DOCD LE1/YR: CPT | Performed by: INTERNAL MEDICINE

## 2018-10-02 PROCEDURE — 3017F COLORECTAL CA SCREEN DOC REV: CPT | Performed by: INTERNAL MEDICINE

## 2018-10-02 PROCEDURE — 96372 THER/PROPH/DIAG INJ SC/IM: CPT | Performed by: INTERNAL MEDICINE

## 2018-10-02 RX ORDER — KETOROLAC TROMETHAMINE 30 MG/ML
30 INJECTION, SOLUTION INTRAMUSCULAR; INTRAVENOUS ONCE
Status: COMPLETED | OUTPATIENT
Start: 2018-10-02 | End: 2018-10-02

## 2018-10-02 RX ORDER — CYANOCOBALAMIN 1000 UG/ML
1000 INJECTION INTRAMUSCULAR; SUBCUTANEOUS ONCE
Status: COMPLETED | OUTPATIENT
Start: 2018-10-02 | End: 2018-10-02

## 2018-10-02 RX ORDER — DIAZEPAM 10 MG/1
TABLET ORAL
Refills: 3 | COMMUNITY
Start: 2018-09-21 | End: 2018-11-21 | Stop reason: SDUPTHER

## 2018-10-02 RX ORDER — INFLUENZA A VIRUS A/MICHIGAN/45/2015 X-275 (H1N1) ANTIGEN (FORMALDEHYDE INACTIVATED), INFLUENZA A VIRUS A/SINGAPORE/INFIMH-16-0019/2016 IVR-186 (H3N2) ANTIGEN (FORMALDEHYDE INACTIVATED), AND INFLUENZA B VIRUS B/MARYLAND/15/2016 BX-69A (A B/COLORADO/6/2017-LIKE VIRUS) ANTIGEN (FORMALDEHYDE INACTIVATED) 60; 60; 60 UG/.5ML; UG/.5ML; UG/.5ML
INJECTION, SUSPENSION INTRAMUSCULAR
Refills: 0 | Status: ON HOLD | COMMUNITY
Start: 2018-09-25 | End: 2020-01-08 | Stop reason: HOSPADM

## 2018-10-02 RX ADMIN — KETOROLAC TROMETHAMINE 30 MG: 30 INJECTION, SOLUTION INTRAMUSCULAR; INTRAVENOUS at 14:55

## 2018-10-02 RX ADMIN — CYANOCOBALAMIN 1000 MCG: 1000 INJECTION INTRAMUSCULAR; SUBCUTANEOUS at 14:54

## 2018-10-02 ASSESSMENT — PATIENT HEALTH QUESTIONNAIRE - PHQ9
SUM OF ALL RESPONSES TO PHQ9 QUESTIONS 1 & 2: 0
2. FEELING DOWN, DEPRESSED OR HOPELESS: 0
SUM OF ALL RESPONSES TO PHQ QUESTIONS 1-9: 0
1. LITTLE INTEREST OR PLEASURE IN DOING THINGS: 0
SUM OF ALL RESPONSES TO PHQ QUESTIONS 1-9: 0

## 2018-10-02 ASSESSMENT — ENCOUNTER SYMPTOMS
EYES NEGATIVE: 1
GASTROINTESTINAL NEGATIVE: 1
RESPIRATORY NEGATIVE: 1

## 2018-10-02 NOTE — PROGRESS NOTES
Subjective:      Patient ID: Manjula Gonzalez is a 70 y.o. male. Hypertension   This is a chronic problem. The problem is controlled. Associated symptoms include anxiety and neck pain. Pertinent negatives include no chest pain. There are no associated agents to hypertension. Risk factors for coronary artery disease include family history and male gender. Past treatments include angiotensin blockers, lifestyle changes and diuretics. The current treatment provides significant improvement. There are no compliance problems. There is no history of CAD/MI. Hand Pain    There was no injury mechanism. The pain is present in the left hand and right hand. The quality of the pain is described as aching. The pain is at a severity of 4/10. The pain is moderate. The pain has been intermittent since the incident. Pertinent negatives include no chest pain. Nothing aggravates the symptoms. The treatment provided moderate relief. Neck Pain    This is a chronic problem. The problem occurs daily. The problem has been unchanged. The quality of the pain is described as aching. The pain is at a severity of 5/10. The pain is moderate. Pertinent negatives include no chest pain. Review of Systems   Constitutional: Negative. HENT: Negative. Eyes: Negative. Respiratory: Negative. Cardiovascular: Negative. Negative for chest pain. Gastrointestinal: Negative. Genitourinary: Negative. Musculoskeletal: Positive for neck pain. Skin: Negative. Neurological: Negative. Psychiatric/Behavioral: Negative. Objective:   Physical Exam   Constitutional: He is oriented to person, place, and time. He appears well-developed and well-nourished. HENT:   Head: Normocephalic and atraumatic. Left Ear: External ear normal.   Eyes: Pupils are equal, round, and reactive to light. Conjunctivae and EOM are normal.   Neck: Normal range of motion. Neck supple. No thyromegaly present.    Cardiovascular: Normal rate,

## 2018-11-20 RX ORDER — LOSARTAN POTASSIUM AND HYDROCHLOROTHIAZIDE 25; 100 MG/1; MG/1
TABLET ORAL
Qty: 30 TABLET | Refills: 3 | Status: SHIPPED | OUTPATIENT
Start: 2018-11-20 | End: 2019-03-14 | Stop reason: SDUPTHER

## 2018-11-21 DIAGNOSIS — G62.9 NEUROPATHY: Primary | ICD-10-CM

## 2018-11-21 DIAGNOSIS — M54.2 NECK PAIN: ICD-10-CM

## 2018-11-21 DIAGNOSIS — F41.1 GENERALIZED ANXIETY DISORDER: ICD-10-CM

## 2018-11-21 RX ORDER — DIAZEPAM 10 MG/1
10 TABLET ORAL EVERY 12 HOURS PRN
Qty: 60 TABLET | Refills: 3 | Status: SHIPPED | OUTPATIENT
Start: 2018-11-21 | End: 2019-01-15 | Stop reason: SDUPTHER

## 2019-01-02 ENCOUNTER — TELEPHONE (OUTPATIENT)
Dept: INTERNAL MEDICINE CLINIC | Age: 69
End: 2019-01-02

## 2019-01-15 ENCOUNTER — OFFICE VISIT (OUTPATIENT)
Dept: INTERNAL MEDICINE CLINIC | Age: 69
End: 2019-01-15
Payer: MEDICARE

## 2019-01-15 VITALS
OXYGEN SATURATION: 97 % | WEIGHT: 157 LBS | HEIGHT: 71 IN | SYSTOLIC BLOOD PRESSURE: 100 MMHG | HEART RATE: 108 BPM | DIASTOLIC BLOOD PRESSURE: 70 MMHG | BODY MASS INDEX: 21.98 KG/M2

## 2019-01-15 DIAGNOSIS — I10 ESSENTIAL HYPERTENSION: ICD-10-CM

## 2019-01-15 DIAGNOSIS — M54.41 CHRONIC BILATERAL LOW BACK PAIN WITH RIGHT-SIDED SCIATICA: ICD-10-CM

## 2019-01-15 DIAGNOSIS — M54.2 NECK PAIN: ICD-10-CM

## 2019-01-15 DIAGNOSIS — Z23 NEED FOR PROPHYLACTIC VACCINATION AGAINST DIPHTHERIA-TETANUS-PERTUSSIS (DTP): Primary | ICD-10-CM

## 2019-01-15 DIAGNOSIS — G89.29 CHRONIC BILATERAL LOW BACK PAIN WITH RIGHT-SIDED SCIATICA: ICD-10-CM

## 2019-01-15 DIAGNOSIS — M79.641 HAND PAIN, RIGHT: ICD-10-CM

## 2019-01-15 DIAGNOSIS — G62.9 NEUROPATHY: ICD-10-CM

## 2019-01-15 DIAGNOSIS — F41.1 GENERALIZED ANXIETY DISORDER: ICD-10-CM

## 2019-01-15 PROCEDURE — 96372 THER/PROPH/DIAG INJ SC/IM: CPT | Performed by: INTERNAL MEDICINE

## 2019-01-15 PROCEDURE — 1123F ACP DISCUSS/DSCN MKR DOCD: CPT | Performed by: INTERNAL MEDICINE

## 2019-01-15 PROCEDURE — G8482 FLU IMMUNIZE ORDER/ADMIN: HCPCS | Performed by: INTERNAL MEDICINE

## 2019-01-15 PROCEDURE — 4004F PT TOBACCO SCREEN RCVD TLK: CPT | Performed by: INTERNAL MEDICINE

## 2019-01-15 PROCEDURE — G8427 DOCREV CUR MEDS BY ELIG CLIN: HCPCS | Performed by: INTERNAL MEDICINE

## 2019-01-15 PROCEDURE — G8420 CALC BMI NORM PARAMETERS: HCPCS | Performed by: INTERNAL MEDICINE

## 2019-01-15 PROCEDURE — G0008 ADMIN INFLUENZA VIRUS VAC: HCPCS | Performed by: INTERNAL MEDICINE

## 2019-01-15 PROCEDURE — 3017F COLORECTAL CA SCREEN DOC REV: CPT | Performed by: INTERNAL MEDICINE

## 2019-01-15 PROCEDURE — 4040F PNEUMOC VAC/ADMIN/RCVD: CPT | Performed by: INTERNAL MEDICINE

## 2019-01-15 PROCEDURE — 1101F PT FALLS ASSESS-DOCD LE1/YR: CPT | Performed by: INTERNAL MEDICINE

## 2019-01-15 PROCEDURE — 99214 OFFICE O/P EST MOD 30 MIN: CPT | Performed by: INTERNAL MEDICINE

## 2019-01-15 PROCEDURE — 90662 IIV NO PRSV INCREASED AG IM: CPT | Performed by: INTERNAL MEDICINE

## 2019-01-15 RX ORDER — KETOROLAC TROMETHAMINE 30 MG/ML
30 INJECTION, SOLUTION INTRAMUSCULAR; INTRAVENOUS ONCE
Status: COMPLETED | OUTPATIENT
Start: 2019-01-15 | End: 2019-01-15

## 2019-01-15 RX ORDER — DIAZEPAM 10 MG/1
10 TABLET ORAL EVERY 12 HOURS PRN
Qty: 60 TABLET | Refills: 3 | Status: SHIPPED | OUTPATIENT
Start: 2019-01-15 | End: 2019-02-14

## 2019-01-15 RX ADMIN — KETOROLAC TROMETHAMINE 30 MG: 30 INJECTION, SOLUTION INTRAMUSCULAR; INTRAVENOUS at 12:07

## 2019-01-15 ASSESSMENT — PATIENT HEALTH QUESTIONNAIRE - PHQ9
1. LITTLE INTEREST OR PLEASURE IN DOING THINGS: 0
SUM OF ALL RESPONSES TO PHQ QUESTIONS 1-9: 0
SUM OF ALL RESPONSES TO PHQ QUESTIONS 1-9: 0
SUM OF ALL RESPONSES TO PHQ9 QUESTIONS 1 & 2: 0
2. FEELING DOWN, DEPRESSED OR HOPELESS: 0

## 2019-01-15 ASSESSMENT — ENCOUNTER SYMPTOMS
SHORTNESS OF BREATH: 1
GASTROINTESTINAL NEGATIVE: 1
EYES NEGATIVE: 1

## 2019-03-14 RX ORDER — LOSARTAN POTASSIUM AND HYDROCHLOROTHIAZIDE 25; 100 MG/1; MG/1
TABLET ORAL
Qty: 30 TABLET | Refills: 3 | Status: SHIPPED | OUTPATIENT
Start: 2019-03-14 | End: 2019-06-09 | Stop reason: SDUPTHER

## 2019-04-16 ENCOUNTER — OFFICE VISIT (OUTPATIENT)
Dept: INTERNAL MEDICINE CLINIC | Age: 69
End: 2019-04-16
Payer: MEDICARE

## 2019-04-16 VITALS
DIASTOLIC BLOOD PRESSURE: 68 MMHG | WEIGHT: 160 LBS | OXYGEN SATURATION: 97 % | BODY MASS INDEX: 22.4 KG/M2 | RESPIRATION RATE: 14 BRPM | HEIGHT: 71 IN | SYSTOLIC BLOOD PRESSURE: 116 MMHG | HEART RATE: 102 BPM

## 2019-04-16 DIAGNOSIS — E78.2 MIXED HYPERLIPIDEMIA: ICD-10-CM

## 2019-04-16 DIAGNOSIS — D50.9 IRON DEFICIENCY ANEMIA, UNSPECIFIED IRON DEFICIENCY ANEMIA TYPE: ICD-10-CM

## 2019-04-16 DIAGNOSIS — M79.642 PAIN IN BOTH HANDS: ICD-10-CM

## 2019-04-16 DIAGNOSIS — M79.641 PAIN IN BOTH HANDS: ICD-10-CM

## 2019-04-16 DIAGNOSIS — I10 ESSENTIAL HYPERTENSION: Primary | ICD-10-CM

## 2019-04-16 DIAGNOSIS — I10 ESSENTIAL HYPERTENSION: ICD-10-CM

## 2019-04-16 LAB
A/G RATIO: 1.1 (ref 1.1–2.2)
ALBUMIN SERPL-MCNC: 4.1 G/DL (ref 3.4–5)
ALP BLD-CCNC: 139 U/L (ref 40–129)
ALT SERPL-CCNC: 45 U/L (ref 10–40)
ANION GAP SERPL CALCULATED.3IONS-SCNC: 14 MMOL/L (ref 3–16)
AST SERPL-CCNC: 50 U/L (ref 15–37)
BILIRUB SERPL-MCNC: 0.4 MG/DL (ref 0–1)
BUN BLDV-MCNC: 51 MG/DL (ref 7–20)
CALCIUM SERPL-MCNC: 9.8 MG/DL (ref 8.3–10.6)
CHLORIDE BLD-SCNC: 107 MMOL/L (ref 99–110)
CHOLESTEROL, TOTAL: 131 MG/DL (ref 0–199)
CO2: 20 MMOL/L (ref 21–32)
CREAT SERPL-MCNC: 2.3 MG/DL (ref 0.8–1.3)
GFR AFRICAN AMERICAN: 34
GFR NON-AFRICAN AMERICAN: 28
GLOBULIN: 3.7 G/DL
GLUCOSE BLD-MCNC: 129 MG/DL (ref 70–99)
HCT VFR BLD CALC: 41.7 % (ref 40.5–52.5)
HDLC SERPL-MCNC: 32 MG/DL (ref 40–60)
HEMOGLOBIN: 13.9 G/DL (ref 13.5–17.5)
LDL CHOLESTEROL CALCULATED: ABNORMAL MG/DL
LDL CHOLESTEROL DIRECT: 33 MG/DL
MCH RBC QN AUTO: 32 PG (ref 26–34)
MCHC RBC AUTO-ENTMCNC: 33.4 G/DL (ref 31–36)
MCV RBC AUTO: 95.7 FL (ref 80–100)
PDW BLD-RTO: 13.1 % (ref 12.4–15.4)
PLATELET # BLD: 185 K/UL (ref 135–450)
PMV BLD AUTO: 11.7 FL (ref 5–10.5)
POTASSIUM SERPL-SCNC: 4.5 MMOL/L (ref 3.5–5.1)
RBC # BLD: 4.35 M/UL (ref 4.2–5.9)
SODIUM BLD-SCNC: 141 MMOL/L (ref 136–145)
TOTAL PROTEIN: 7.8 G/DL (ref 6.4–8.2)
TRIGL SERPL-MCNC: 497 MG/DL (ref 0–150)
VLDLC SERPL CALC-MCNC: ABNORMAL MG/DL
WBC # BLD: 4.7 K/UL (ref 4–11)

## 2019-04-16 PROCEDURE — 4040F PNEUMOC VAC/ADMIN/RCVD: CPT | Performed by: INTERNAL MEDICINE

## 2019-04-16 PROCEDURE — 96372 THER/PROPH/DIAG INJ SC/IM: CPT | Performed by: INTERNAL MEDICINE

## 2019-04-16 PROCEDURE — 4004F PT TOBACCO SCREEN RCVD TLK: CPT | Performed by: INTERNAL MEDICINE

## 2019-04-16 PROCEDURE — 99214 OFFICE O/P EST MOD 30 MIN: CPT | Performed by: INTERNAL MEDICINE

## 2019-04-16 PROCEDURE — G8420 CALC BMI NORM PARAMETERS: HCPCS | Performed by: INTERNAL MEDICINE

## 2019-04-16 PROCEDURE — G8427 DOCREV CUR MEDS BY ELIG CLIN: HCPCS | Performed by: INTERNAL MEDICINE

## 2019-04-16 PROCEDURE — 3017F COLORECTAL CA SCREEN DOC REV: CPT | Performed by: INTERNAL MEDICINE

## 2019-04-16 PROCEDURE — 1123F ACP DISCUSS/DSCN MKR DOCD: CPT | Performed by: INTERNAL MEDICINE

## 2019-04-16 RX ORDER — CYANOCOBALAMIN 1000 UG/ML
1000 INJECTION INTRAMUSCULAR; SUBCUTANEOUS ONCE
Status: COMPLETED | OUTPATIENT
Start: 2019-04-16 | End: 2019-04-16

## 2019-04-16 RX ORDER — TRIAMCINOLONE ACETONIDE 40 MG/ML
40 INJECTION, SUSPENSION INTRA-ARTICULAR; INTRAMUSCULAR ONCE
Status: COMPLETED | OUTPATIENT
Start: 2019-04-16 | End: 2019-04-16

## 2019-04-16 RX ORDER — SILDENAFIL 100 MG/1
100 TABLET, FILM COATED ORAL PRN
Qty: 30 TABLET | Refills: 3 | Status: SHIPPED | OUTPATIENT
Start: 2019-04-16 | End: 2020-01-23 | Stop reason: ALTCHOICE

## 2019-04-16 RX ORDER — DIAZEPAM 10 MG/1
TABLET ORAL
Refills: 3 | COMMUNITY
Start: 2019-03-23 | End: 2019-05-25 | Stop reason: SDUPTHER

## 2019-04-16 RX ADMIN — CYANOCOBALAMIN 1000 MCG: 1000 INJECTION INTRAMUSCULAR; SUBCUTANEOUS at 14:42

## 2019-04-16 RX ADMIN — TRIAMCINOLONE ACETONIDE 40 MG: 40 INJECTION, SUSPENSION INTRA-ARTICULAR; INTRAMUSCULAR at 14:44

## 2019-04-16 ASSESSMENT — ENCOUNTER SYMPTOMS
EYES NEGATIVE: 1
RESPIRATORY NEGATIVE: 1
BACK PAIN: 1
GASTROINTESTINAL NEGATIVE: 1

## 2019-04-16 NOTE — PROGRESS NOTES
Subjective:      Patient ID: Alex Peres is a 78 y.o. male. Hypertension   This is a chronic problem. The problem is unchanged. The problem is controlled. Associated symptoms include neck pain. Pertinent negatives include no headaches. There are no associated agents to hypertension. Risk factors for coronary artery disease include family history and male gender. Past treatments include lifestyle changes, calcium channel blockers and angiotensin blockers. The current treatment provides significant improvement. There are no compliance problems. There is no history of CAD/MI. Neck Pain    This is a chronic problem. The problem occurs daily. The problem has been unchanged. The pain is associated with an unknown factor. The pain is present in the anterior neck. The quality of the pain is described as aching. The pain is at a severity of 4/10. The pain is severe. Nothing aggravates the symptoms. The pain is same all the time. Stiffness is present at night. Associated symptoms include tingling. Pertinent negatives include no headaches. He has tried bed rest for the symptoms. The treatment provided moderate relief. Hand Pain    The incident occurred at home. The pain is present in the left hand and right hand. The quality of the pain is described as aching. The pain is at a severity of 4/10. The pain is moderate. The pain has been intermittent since the incident. Associated symptoms include tingling. He has tried acetaminophen and NSAIDs for the symptoms. The treatment provided moderate relief. Review of Systems   Constitutional: Negative. HENT: Negative. Eyes: Negative. Respiratory: Negative. Cardiovascular: Negative. Gastrointestinal: Negative. Genitourinary: Negative. Musculoskeletal: Positive for arthralgias, back pain and neck pain. Skin: Negative. Neurological: Positive for tingling. Negative for headaches. Psychiatric/Behavioral: Negative.         Objective:   Physical Exam   Constitutional: He is oriented to person, place, and time. He appears well-developed and well-nourished. HENT:   Head: Normocephalic and atraumatic. Left Ear: External ear normal.   Eyes: Pupils are equal, round, and reactive to light. Conjunctivae and EOM are normal.   Neck: Normal range of motion. Neck supple. No thyromegaly present. Cardiovascular: Normal rate, regular rhythm and normal heart sounds. No murmur heard. Pulmonary/Chest: Effort normal and breath sounds normal. No respiratory distress. He has no wheezes. He has no rales. Abdominal: Soft. Bowel sounds are normal.   Musculoskeletal: Normal range of motion. Neurological: He is alert and oriented to person, place, and time. Skin: Skin is warm and dry. Psychiatric: He has a normal mood and affect. Current Outpatient Medications on File Prior to Visit   Medication Sig Dispense Refill    diazepam (VALIUM) 10 MG tablet TAKE 1 TABLET BY MOUTH EVERY TWELVE HOURS AS NEEDED FOR ANXIETY FOR UP TO 30 DAYS  3    losartan-hydrochlorothiazide (HYZAAR) 100-25 MG per tablet TAKE 1 TABLET BY MOUTH EVERY DAY 30 tablet 3    FLUZONE HIGH-DOSE 0.5 ML LESLYE injection TO BE ADMINISTERED BY PHARMACIST FOR IMMUNIZATION  0    nicotine (NICODERM CQ) 21 MG/24HR Place 1 patch onto the skin every 24 hours 30 patch 3    sildenafil (VIAGRA) 100 MG tablet Take 1 tablet by mouth as needed for Erectile Dysfunction 3 tablet 3    oxyCODONE-acetaminophen (PERCOCET) 5-325 MG per tablet Take 1 tablet by mouth 3 times daily as needed for Pain .       tadalafil (CIALIS) 5 MG tablet Take 1 tablet by mouth as needed for Erectile Dysfunction 30 tablet 3     Current Facility-Administered Medications on File Prior to Visit   Medication Dose Route Frequency Provider Last Rate Last Dose    triamcinolone acetonide (KENALOG-40) injection 40 mg  40 mg Intramuscular Once C Rhonda Yoon MD        triamcinolone acetonide [facility]) injection 40 mg  40 mg Intramuscular Once MADELYN Olsen MD        triamcinolone acetonide [facility]) injection 40 mg  40 mg Intramuscular Once MADELYN Olsen MD           Assessment:        Diagnosis Orders   1. Essential hypertension     2. Mixed hyperlipidemia     3.  Pain in both hands             Plan:    continue meds    Refills    Kenalog 40mg IM    Return in 3 months            MADELYN Olsen MD

## 2019-04-25 ENCOUNTER — TELEPHONE (OUTPATIENT)
Dept: INTERNAL MEDICINE CLINIC | Age: 69
End: 2019-04-25

## 2019-04-25 NOTE — TELEPHONE ENCOUNTER
Brady from [facility]. calling to inform Dr Watters Grain the patient was prescribed Diazepan but the patient is receiving   Percocet from another Pharmacy. Please call Brady at [phone].

## 2019-05-25 DIAGNOSIS — F41.1 GENERALIZED ANXIETY DISORDER: Primary | ICD-10-CM

## 2019-05-31 RX ORDER — DIAZEPAM 10 MG/1
TABLET ORAL
Qty: 60 TABLET | Refills: 2 | Status: SHIPPED | OUTPATIENT
Start: 2019-05-31 | End: 2019-08-28 | Stop reason: SDUPTHER

## 2019-06-18 RX ORDER — LOSARTAN POTASSIUM AND HYDROCHLOROTHIAZIDE 25; 100 MG/1; MG/1
TABLET ORAL
Qty: 30 TABLET | Refills: 0 | Status: SHIPPED | OUTPATIENT
Start: 2019-06-18 | End: 2019-07-16 | Stop reason: SDUPTHER

## 2019-07-24 RX ORDER — LOSARTAN POTASSIUM AND HYDROCHLOROTHIAZIDE 25; 100 MG/1; MG/1
TABLET ORAL
Qty: 30 TABLET | Refills: 0 | Status: SHIPPED | OUTPATIENT
Start: 2019-07-24 | End: 2019-08-15 | Stop reason: SDUPTHER

## 2019-08-16 RX ORDER — LOSARTAN POTASSIUM AND HYDROCHLOROTHIAZIDE 25; 100 MG/1; MG/1
TABLET ORAL
Qty: 30 TABLET | Refills: 0 | Status: SHIPPED | OUTPATIENT
Start: 2019-08-16 | End: 2019-08-28 | Stop reason: SDUPTHER

## 2019-08-28 ENCOUNTER — TELEPHONE (OUTPATIENT)
Dept: INTERNAL MEDICINE CLINIC | Age: 69
End: 2019-08-28

## 2019-08-28 DIAGNOSIS — F41.1 GENERALIZED ANXIETY DISORDER: ICD-10-CM

## 2019-08-29 RX ORDER — LOSARTAN POTASSIUM AND HYDROCHLOROTHIAZIDE 25; 100 MG/1; MG/1
TABLET ORAL
Qty: 30 TABLET | Refills: 0 | Status: ON HOLD | OUTPATIENT
Start: 2019-08-29 | End: 2020-01-08 | Stop reason: HOSPADM

## 2019-08-29 RX ORDER — DIAZEPAM 10 MG/1
TABLET ORAL
Qty: 60 TABLET | Refills: 5 | Status: SHIPPED | OUTPATIENT
Start: 2019-08-29 | End: 2020-01-23 | Stop reason: SDUPTHER

## 2019-12-29 ENCOUNTER — APPOINTMENT (OUTPATIENT)
Dept: GENERAL RADIOLOGY | Age: 69
DRG: 243 | End: 2019-12-29
Payer: MEDICARE

## 2019-12-29 ENCOUNTER — APPOINTMENT (OUTPATIENT)
Dept: CT IMAGING | Age: 69
DRG: 243 | End: 2019-12-29
Payer: MEDICARE

## 2019-12-29 ENCOUNTER — HOSPITAL ENCOUNTER (INPATIENT)
Age: 69
LOS: 10 days | Discharge: HOME | DRG: 243 | End: 2020-01-08
Attending: EMERGENCY MEDICINE | Admitting: INTERNAL MEDICINE
Payer: MEDICARE

## 2019-12-29 PROBLEM — N17.9 ACUTE RENAL FAILURE (ARF) (HCC): Status: ACTIVE | Noted: 2019-12-29

## 2019-12-29 LAB
ANION GAP SERPL CALCULATED.3IONS-SCNC: 18 MMOL/L (ref 3–16)
BASOPHILS ABSOLUTE: 0 K/UL (ref 0–0.2)
BASOPHILS RELATIVE PERCENT: 0.7 %
BUN BLDV-MCNC: 60 MG/DL (ref 7–20)
CALCIUM SERPL-MCNC: 9.9 MG/DL (ref 8.3–10.6)
CHLORIDE BLD-SCNC: 102 MMOL/L (ref 99–110)
CO2: 21 MMOL/L (ref 21–32)
CREAT SERPL-MCNC: 3.3 MG/DL (ref 0.8–1.3)
EOSINOPHILS ABSOLUTE: 0.2 K/UL (ref 0–0.6)
EOSINOPHILS RELATIVE PERCENT: 2.4 %
GFR AFRICAN AMERICAN: 22
GFR NON-AFRICAN AMERICAN: 19
GLUCOSE BLD-MCNC: 108 MG/DL (ref 70–99)
HCT VFR BLD CALC: 43 % (ref 40.5–52.5)
HEMOGLOBIN: 14.2 G/DL (ref 13.5–17.5)
LYMPHOCYTES ABSOLUTE: 3.7 K/UL (ref 1–5.1)
LYMPHOCYTES RELATIVE PERCENT: 53 %
MCH RBC QN AUTO: 32.2 PG (ref 26–34)
MCHC RBC AUTO-ENTMCNC: 33 G/DL (ref 31–36)
MCV RBC AUTO: 97.5 FL (ref 80–100)
MONOCYTES ABSOLUTE: 0.7 K/UL (ref 0–1.3)
MONOCYTES RELATIVE PERCENT: 9.8 %
NEUTROPHILS ABSOLUTE: 2.4 K/UL (ref 1.7–7.7)
NEUTROPHILS RELATIVE PERCENT: 34.1 %
PDW BLD-RTO: 12.9 % (ref 12.4–15.4)
PHOSPHORUS: 4.3 MG/DL (ref 2.5–4.9)
PLATELET # BLD: 207 K/UL (ref 135–450)
PMV BLD AUTO: 10.9 FL (ref 5–10.5)
POTASSIUM REFLEX MAGNESIUM: 4.4 MMOL/L (ref 3.5–5.1)
PRO-BNP: 55 PG/ML (ref 0–124)
RAPID INFLUENZA  B AGN: NEGATIVE
RAPID INFLUENZA A AGN: NEGATIVE
RBC # BLD: 4.41 M/UL (ref 4.2–5.9)
SODIUM BLD-SCNC: 141 MMOL/L (ref 136–145)
TROPONIN: <0.01 NG/ML
WBC # BLD: 7 K/UL (ref 4–11)

## 2019-12-29 PROCEDURE — 6360000002 HC RX W HCPCS: Performed by: INTERNAL MEDICINE

## 2019-12-29 PROCEDURE — 84155 ASSAY OF PROTEIN SERUM: CPT

## 2019-12-29 PROCEDURE — 2580000003 HC RX 258: Performed by: INTERNAL MEDICINE

## 2019-12-29 PROCEDURE — 96375 TX/PRO/DX INJ NEW DRUG ADDON: CPT

## 2019-12-29 PROCEDURE — 93005 ELECTROCARDIOGRAM TRACING: CPT | Performed by: STUDENT IN AN ORGANIZED HEALTH CARE EDUCATION/TRAINING PROGRAM

## 2019-12-29 PROCEDURE — 85025 COMPLETE CBC W/AUTO DIFF WBC: CPT

## 2019-12-29 PROCEDURE — 6360000002 HC RX W HCPCS

## 2019-12-29 PROCEDURE — 71046 X-RAY EXAM CHEST 2 VIEWS: CPT

## 2019-12-29 PROCEDURE — 2500000003 HC RX 250 WO HCPCS: Performed by: INTERNAL MEDICINE

## 2019-12-29 PROCEDURE — 83883 ASSAY NEPHELOMETRY NOT SPEC: CPT

## 2019-12-29 PROCEDURE — 99285 EMERGENCY DEPT VISIT HI MDM: CPT

## 2019-12-29 PROCEDURE — 87804 INFLUENZA ASSAY W/OPTIC: CPT

## 2019-12-29 PROCEDURE — 72125 CT NECK SPINE W/O DYE: CPT

## 2019-12-29 PROCEDURE — 80048 BASIC METABOLIC PNL TOTAL CA: CPT

## 2019-12-29 PROCEDURE — 84484 ASSAY OF TROPONIN QUANT: CPT

## 2019-12-29 PROCEDURE — 6360000002 HC RX W HCPCS: Performed by: STUDENT IN AN ORGANIZED HEALTH CARE EDUCATION/TRAINING PROGRAM

## 2019-12-29 PROCEDURE — 73660 X-RAY EXAM OF TOE(S): CPT

## 2019-12-29 PROCEDURE — 6370000000 HC RX 637 (ALT 250 FOR IP): Performed by: INTERNAL MEDICINE

## 2019-12-29 PROCEDURE — 96374 THER/PROPH/DIAG INJ IV PUSH: CPT

## 2019-12-29 PROCEDURE — 1200000000 HC SEMI PRIVATE

## 2019-12-29 PROCEDURE — 36415 COLL VENOUS BLD VENIPUNCTURE: CPT

## 2019-12-29 PROCEDURE — 84100 ASSAY OF PHOSPHORUS: CPT

## 2019-12-29 PROCEDURE — 83880 ASSAY OF NATRIURETIC PEPTIDE: CPT

## 2019-12-29 PROCEDURE — 84165 PROTEIN E-PHORESIS SERUM: CPT

## 2019-12-29 RX ORDER — SODIUM CHLORIDE 0.9 % (FLUSH) 0.9 %
10 SYRINGE (ML) INJECTION EVERY 12 HOURS SCHEDULED
Status: DISCONTINUED | OUTPATIENT
Start: 2019-12-29 | End: 2020-01-08 | Stop reason: HOSPADM

## 2019-12-29 RX ORDER — SODIUM CHLORIDE 0.9 % (FLUSH) 0.9 %
10 SYRINGE (ML) INJECTION PRN
Status: DISCONTINUED | OUTPATIENT
Start: 2019-12-29 | End: 2020-01-08 | Stop reason: HOSPADM

## 2019-12-29 RX ORDER — HEPARIN SODIUM 5000 [USP'U]/ML
5000 INJECTION, SOLUTION INTRAVENOUS; SUBCUTANEOUS EVERY 8 HOURS SCHEDULED
Status: DISCONTINUED | OUTPATIENT
Start: 2019-12-29 | End: 2020-01-08 | Stop reason: HOSPADM

## 2019-12-29 RX ORDER — ONDANSETRON 2 MG/ML
4 INJECTION INTRAMUSCULAR; INTRAVENOUS EVERY 6 HOURS PRN
Status: DISCONTINUED | OUTPATIENT
Start: 2019-12-29 | End: 2020-01-08 | Stop reason: HOSPADM

## 2019-12-29 RX ORDER — NICOTINE 21 MG/24HR
1 PATCH, TRANSDERMAL 24 HOURS TRANSDERMAL DAILY
Status: DISCONTINUED | OUTPATIENT
Start: 2019-12-29 | End: 2020-01-08 | Stop reason: HOSPADM

## 2019-12-29 RX ORDER — ACETAMINOPHEN 325 MG/1
650 TABLET ORAL ONCE
Status: DISCONTINUED | OUTPATIENT
Start: 2019-12-29 | End: 2020-01-08 | Stop reason: HOSPADM

## 2019-12-29 RX ORDER — ONDANSETRON 2 MG/ML
INJECTION INTRAMUSCULAR; INTRAVENOUS
Status: COMPLETED
Start: 2019-12-29 | End: 2019-12-29

## 2019-12-29 RX ORDER — TRAMADOL HYDROCHLORIDE 50 MG/1
50 TABLET ORAL EVERY 12 HOURS PRN
Status: DISCONTINUED | OUTPATIENT
Start: 2019-12-30 | End: 2020-01-01

## 2019-12-29 RX ORDER — ACETAMINOPHEN 325 MG/1
650 TABLET ORAL EVERY 4 HOURS PRN
Status: DISCONTINUED | OUTPATIENT
Start: 2019-12-29 | End: 2020-01-04

## 2019-12-29 RX ORDER — ONDANSETRON 2 MG/ML
8 INJECTION INTRAMUSCULAR; INTRAVENOUS ONCE
Status: COMPLETED | OUTPATIENT
Start: 2019-12-29 | End: 2019-12-29

## 2019-12-29 RX ADMIN — HYDROMORPHONE HYDROCHLORIDE 0.5 MG: 1 INJECTION, SOLUTION INTRAMUSCULAR; INTRAVENOUS; SUBCUTANEOUS at 21:04

## 2019-12-29 RX ADMIN — HEPARIN SODIUM 5000 UNITS: 5000 INJECTION INTRAVENOUS; SUBCUTANEOUS at 23:01

## 2019-12-29 RX ADMIN — SODIUM BICARBONATE: 84 INJECTION, SOLUTION INTRAVENOUS at 23:01

## 2019-12-29 RX ADMIN — ONDANSETRON 8 MG: 2 INJECTION INTRAMUSCULAR; INTRAVENOUS at 21:04

## 2019-12-29 RX ADMIN — Medication 10 ML: at 23:40

## 2019-12-29 ASSESSMENT — ENCOUNTER SYMPTOMS
BLOOD IN STOOL: 0
NAUSEA: 1
COUGH: 0
RHINORRHEA: 0
ABDOMINAL PAIN: 0
VOMITING: 1
SINUS PRESSURE: 0
SHORTNESS OF BREATH: 1
ANAL BLEEDING: 1
VOICE CHANGE: 1
DIARRHEA: 1

## 2019-12-29 ASSESSMENT — PAIN DESCRIPTION - ORIENTATION: ORIENTATION: RIGHT

## 2019-12-29 ASSESSMENT — PAIN DESCRIPTION - LOCATION: LOCATION: FOOT

## 2019-12-29 ASSESSMENT — PAIN SCALES - GENERAL: PAINLEVEL_OUTOF10: 9

## 2019-12-29 ASSESSMENT — PAIN DESCRIPTION - PAIN TYPE: TYPE: ACUTE PAIN

## 2019-12-29 NOTE — ED PROVIDER NOTES
[facility] RESIDENT NOTE       Date of evaluation: 12/29/2019    Chief Complaint     Dizziness (Pt LOC last week and injuried his right foot. Pt now complaining of dizziness, nausea, vomiting, and diarrhea. ); Fever; Fall; and Foot Injury      History of Present Illness     Robert Marks is a 78 y.o. male who presents with multiple complaints including N/V/D, dizziness and syncope, and right toe pain. Patient states he has had persistent nausea, vomiting, and diarrhea for the past 3 weeks. He has noticed some blood on the toilet paper when he wipes, but has not had any grossly bloody stool. He has not had any bloody vomit. He does endorse increased hoarseness over the past several weeks. He also reports a \"queasy\" sensation in his chest that is been present for at least a week. He also reports at least 2, and possibly 3, syncopal episodes in the last week. These occurred while standing, he had a presyncopal prodrome of dizziness, and subsequently woke up on the ground with his dog licking his face. He did not previously seek care for any of these episodes. Comes in today with persistent pain in his right second toe that he believes he injured during his last syncopal episode. Review of Systems     Review of Systems   Constitutional: Negative for chills and fever. HENT: Positive for voice change. Negative for rhinorrhea and sinus pressure. Eyes: Negative for visual disturbance. Respiratory: Positive for shortness of breath. Negative for cough. Cardiovascular: Positive for chest pain. Negative for palpitations. Gastrointestinal: Positive for anal bleeding, diarrhea, nausea and vomiting. Negative for abdominal pain and blood in stool. Genitourinary: Negative for dysuria. Skin: Negative for rash. Neurological: Positive for dizziness and syncope. Negative for seizures, weakness and headaches.        Past Medical, Surgical, Family, effort is normal.      Breath sounds: Wheezing and rhonchi present. Abdominal:      General: Abdomen is flat. There is no distension. Palpations: Abdomen is soft. There is no mass. Tenderness: There is no tenderness. There is no guarding or rebound. Musculoskeletal:         General: No swelling. Skin:     General: Skin is warm and dry. Comments: Dry, flaky skin. Neurological:      Mental Status: He is alert and oriented to person, place, and time. Comments: Moves all extremities well. DiagnosticResults     RADIOLOGY:  XR TOE RIGHT (MIN 2 VIEWS)   Final Result      No sign of any acute fracture or radiopaque foreign body. Soft tissue swelling overlying the second toe distally but no discrete cortical bony defect, limited as described above       XR CHEST STANDARD (2 VW)   Final Result      No acute process or active consolidation, slightly tortuous aorta noted.       .      CT CERVICAL SPINE WO CONTRAST    (Results Pending)       LABS:   Results for orders placed or performed during the hospital encounter of 12/29/19   Rapid Flu Swab   Result Value Ref Range    Rapid Influenza A Ag Negative Negative    Rapid Influenza B Ag Negative Negative   CBC Auto Differential   Result Value Ref Range    WBC 7.0 4.0 - 11.0 K/uL    RBC 4.41 4.20 - 5.90 M/uL    Hemoglobin 14.2 13.5 - 17.5 g/dL    Hematocrit 43.0 40.5 - 52.5 %    MCV 97.5 80.0 - 100.0 fL    MCH 32.2 26.0 - 34.0 pg    MCHC 33.0 31.0 - 36.0 g/dL    RDW 12.9 12.4 - 15.4 %    Platelets 837 470 - 920 K/uL    MPV 10.9 (H) 5.0 - 10.5 fL    Neutrophils % 34.1 %    Lymphocytes % 53.0 %    Monocytes % 9.8 %    Eosinophils % 2.4 %    Basophils % 0.7 %    Neutrophils Absolute 2.4 1.7 - 7.7 K/uL    Lymphocytes Absolute 3.7 1.0 - 5.1 K/uL    Monocytes Absolute 0.7 0.0 - 1.3 K/uL    Eosinophils Absolute 0.2 0.0 - 0.6 K/uL    Basophils Absolute 0.0 0.0 - 0.2 K/uL   Basic Metabolic Panel w/ Reflex to MG   Result Value Ref Range    Sodium 141 136

## 2019-12-30 LAB
ANION GAP SERPL CALCULATED.3IONS-SCNC: 17 MMOL/L (ref 3–16)
BASOPHILS ABSOLUTE: 0 K/UL (ref 0–0.2)
BASOPHILS RELATIVE PERCENT: 0.5 %
BUN BLDV-MCNC: 57 MG/DL (ref 7–20)
CALCIUM SERPL-MCNC: 9 MG/DL (ref 8.3–10.6)
CHLORIDE BLD-SCNC: 100 MMOL/L (ref 99–110)
CO2: 20 MMOL/L (ref 21–32)
CREAT SERPL-MCNC: 2.8 MG/DL (ref 0.8–1.3)
EKG ATRIAL RATE: 79 BPM
EKG DIAGNOSIS: NORMAL
EKG P AXIS: 70 DEGREES
EKG P-R INTERVAL: 172 MS
EKG Q-T INTERVAL: 374 MS
EKG QRS DURATION: 82 MS
EKG QTC CALCULATION (BAZETT): 428 MS
EKG R AXIS: 26 DEGREES
EKG T AXIS: 79 DEGREES
EKG VENTRICULAR RATE: 79 BPM
EOSINOPHILS ABSOLUTE: 0.2 K/UL (ref 0–0.6)
EOSINOPHILS RELATIVE PERCENT: 3.7 %
GFR AFRICAN AMERICAN: 27
GFR NON-AFRICAN AMERICAN: 22
GLUCOSE BLD-MCNC: 122 MG/DL (ref 70–99)
HCT VFR BLD CALC: 38.2 % (ref 40.5–52.5)
HEMOGLOBIN: 13.2 G/DL (ref 13.5–17.5)
LYMPHOCYTES ABSOLUTE: 3.5 K/UL (ref 1–5.1)
LYMPHOCYTES RELATIVE PERCENT: 57.8 %
MAGNESIUM: 2.2 MG/DL (ref 1.8–2.4)
MCH RBC QN AUTO: 33.4 PG (ref 26–34)
MCHC RBC AUTO-ENTMCNC: 34.7 G/DL (ref 31–36)
MCV RBC AUTO: 96.3 FL (ref 80–100)
MONOCYTES ABSOLUTE: 0.5 K/UL (ref 0–1.3)
MONOCYTES RELATIVE PERCENT: 8.5 %
NEUTROPHILS ABSOLUTE: 1.8 K/UL (ref 1.7–7.7)
NEUTROPHILS RELATIVE PERCENT: 29.5 %
PDW BLD-RTO: 12.5 % (ref 12.4–15.4)
PLATELET # BLD: 186 K/UL (ref 135–450)
PMV BLD AUTO: 11.1 FL (ref 5–10.5)
POTASSIUM REFLEX MAGNESIUM: 3.5 MMOL/L (ref 3.5–5.1)
RBC # BLD: 3.97 M/UL (ref 4.2–5.9)
SODIUM BLD-SCNC: 137 MMOL/L (ref 136–145)
WBC # BLD: 6 K/UL (ref 4–11)

## 2019-12-30 PROCEDURE — 97535 SELF CARE MNGMENT TRAINING: CPT

## 2019-12-30 PROCEDURE — 97116 GAIT TRAINING THERAPY: CPT

## 2019-12-30 PROCEDURE — 83735 ASSAY OF MAGNESIUM: CPT

## 2019-12-30 PROCEDURE — 6370000000 HC RX 637 (ALT 250 FOR IP): Performed by: INTERNAL MEDICINE

## 2019-12-30 PROCEDURE — 6360000002 HC RX W HCPCS: Performed by: INTERNAL MEDICINE

## 2019-12-30 PROCEDURE — 97162 PT EVAL MOD COMPLEX 30 MIN: CPT

## 2019-12-30 PROCEDURE — 97530 THERAPEUTIC ACTIVITIES: CPT

## 2019-12-30 PROCEDURE — 2580000003 HC RX 258: Performed by: INTERNAL MEDICINE

## 2019-12-30 PROCEDURE — 2500000003 HC RX 250 WO HCPCS: Performed by: INTERNAL MEDICINE

## 2019-12-30 PROCEDURE — 1200000000 HC SEMI PRIVATE

## 2019-12-30 PROCEDURE — 97165 OT EVAL LOW COMPLEX 30 MIN: CPT

## 2019-12-30 PROCEDURE — 85025 COMPLETE CBC W/AUTO DIFF WBC: CPT

## 2019-12-30 PROCEDURE — 36415 COLL VENOUS BLD VENIPUNCTURE: CPT

## 2019-12-30 PROCEDURE — 2580000003 HC RX 258

## 2019-12-30 PROCEDURE — 80048 BASIC METABOLIC PNL TOTAL CA: CPT

## 2019-12-30 RX ORDER — POTASSIUM CHLORIDE 20 MEQ/1
40 TABLET, EXTENDED RELEASE ORAL ONCE
Status: COMPLETED | OUTPATIENT
Start: 2019-12-30 | End: 2019-12-30

## 2019-12-30 RX ORDER — DIAZEPAM 5 MG/1
10 TABLET ORAL EVERY 12 HOURS PRN
Status: DISCONTINUED | OUTPATIENT
Start: 2019-12-30 | End: 2020-01-08 | Stop reason: HOSPADM

## 2019-12-30 RX ORDER — SODIUM BICARBONATE 650 MG/1
650 TABLET ORAL 3 TIMES DAILY
Status: DISCONTINUED | OUTPATIENT
Start: 2019-12-30 | End: 2019-12-31

## 2019-12-30 RX ORDER — SODIUM CHLORIDE 9 MG/ML
INJECTION, SOLUTION INTRAVENOUS
Status: COMPLETED
Start: 2019-12-30 | End: 2019-12-30

## 2019-12-30 RX ORDER — SODIUM CHLORIDE 9 MG/ML
INJECTION, SOLUTION INTRAVENOUS CONTINUOUS
Status: DISCONTINUED | OUTPATIENT
Start: 2019-12-30 | End: 2019-12-31

## 2019-12-30 RX ADMIN — POTASSIUM CHLORIDE 40 MEQ: 20 TABLET, EXTENDED RELEASE ORAL at 14:52

## 2019-12-30 RX ADMIN — SODIUM CHLORIDE: 9 INJECTION, SOLUTION INTRAVENOUS at 18:29

## 2019-12-30 RX ADMIN — TRAMADOL HYDROCHLORIDE 50 MG: 50 TABLET, FILM COATED ORAL at 06:05

## 2019-12-30 RX ADMIN — HEPARIN SODIUM 5000 UNITS: 5000 INJECTION INTRAVENOUS; SUBCUTANEOUS at 14:52

## 2019-12-30 RX ADMIN — HEPARIN SODIUM 5000 UNITS: 5000 INJECTION INTRAVENOUS; SUBCUTANEOUS at 06:02

## 2019-12-30 RX ADMIN — DIAZEPAM 10 MG: 5 TABLET ORAL at 20:53

## 2019-12-30 RX ADMIN — SODIUM BICARBONATE 650 MG: 650 TABLET ORAL at 20:53

## 2019-12-30 RX ADMIN — ONDANSETRON 4 MG: 2 INJECTION INTRAMUSCULAR; INTRAVENOUS at 20:53

## 2019-12-30 RX ADMIN — Medication 10 ML: at 08:50

## 2019-12-30 RX ADMIN — DIAZEPAM 10 MG: 5 TABLET ORAL at 02:09

## 2019-12-30 RX ADMIN — SODIUM BICARBONATE: 84 INJECTION, SOLUTION INTRAVENOUS at 13:46

## 2019-12-30 RX ADMIN — SODIUM BICARBONATE 650 MG: 650 TABLET ORAL at 14:52

## 2019-12-30 RX ADMIN — ONDANSETRON 4 MG: 2 INJECTION INTRAMUSCULAR; INTRAVENOUS at 13:02

## 2019-12-30 RX ADMIN — ONDANSETRON 4 MG: 2 INJECTION INTRAMUSCULAR; INTRAVENOUS at 06:34

## 2019-12-30 RX ADMIN — HEPARIN SODIUM 5000 UNITS: 5000 INJECTION INTRAVENOUS; SUBCUTANEOUS at 22:22

## 2019-12-30 RX ADMIN — TRAMADOL HYDROCHLORIDE 50 MG: 50 TABLET, FILM COATED ORAL at 18:28

## 2019-12-30 ASSESSMENT — PAIN DESCRIPTION - LOCATION
LOCATION: FOOT;ARM;LEG
LOCATION: ARM

## 2019-12-30 ASSESSMENT — PAIN SCALES - GENERAL
PAINLEVEL_OUTOF10: 8
PAINLEVEL_OUTOF10: 8
PAINLEVEL_OUTOF10: 10
PAINLEVEL_OUTOF10: 6
PAINLEVEL_OUTOF10: 0
PAINLEVEL_OUTOF10: 8
PAINLEVEL_OUTOF10: 9

## 2019-12-30 ASSESSMENT — PAIN - FUNCTIONAL ASSESSMENT
PAIN_FUNCTIONAL_ASSESSMENT: PREVENTS OR INTERFERES SOME ACTIVE ACTIVITIES AND ADLS
PAIN_FUNCTIONAL_ASSESSMENT: PREVENTS OR INTERFERES SOME ACTIVE ACTIVITIES AND ADLS

## 2019-12-30 ASSESSMENT — PAIN DESCRIPTION - PAIN TYPE
TYPE: ACUTE PAIN

## 2019-12-30 ASSESSMENT — PAIN DESCRIPTION - ONSET
ONSET: ON-GOING
ONSET: ON-GOING

## 2019-12-30 ASSESSMENT — PAIN DESCRIPTION - DESCRIPTORS
DESCRIPTORS: ACHING
DESCRIPTORS: ACHING

## 2019-12-30 ASSESSMENT — PAIN DESCRIPTION - PROGRESSION
CLINICAL_PROGRESSION: GRADUALLY WORSENING
CLINICAL_PROGRESSION: GRADUALLY WORSENING

## 2019-12-30 ASSESSMENT — PAIN DESCRIPTION - ORIENTATION
ORIENTATION: RIGHT
ORIENTATION: RIGHT

## 2019-12-30 ASSESSMENT — PAIN DESCRIPTION - FREQUENCY
FREQUENCY: CONTINUOUS
FREQUENCY: CONTINUOUS

## 2019-12-30 NOTE — PROGRESS NOTES
HOSPITAL MEDICINE  - PROGRESS NOTE    Admit Date: 12/29/2019         Interval History: *78 y.o. male who presents after? Syncopal episode patient states that his legs gave away and he went down on his knees and unclear he lost consciousness.   -has been having a sinus infection with postnasal drip and was vomiting and  having diarrhea over the past 3 to 4 days  - has passed out 2-3 times over the past week. No  chest pain, shortness of breath, palpitations but has been feeling lightheaded prior during these episodes.     Sub  Feels better-no further dizziness  Objective: afebrile    Diet: DIET GENERAL;  Dietary Nutrition Supplements: Standard High Calorie Oral Supplement      Data:   Scheduled Meds: Reviewed  Continuous Infusions:   sodium bicarbonate infusion 75 mL/hr at 12/30/19 1346       Intake/Output Summary (Last 24 hours) at 12/30/2019 1348  Last data filed at 12/30/2019 0940  Gross per 24 hour   Intake 650 ml   Output 200 ml   Net 450 ml     CBC:   Recent Labs     12/30/19  0611   WBC 6.0   HGB 13.2*        BMP:  Recent Labs     12/30/19  0611      K 3.5      CO2 20*   BUN 57*   CREATININE 2.8*   GLUCOSE 122*         Objective:   Vitals: /72   Pulse 89   Temp 97.5 °F (36.4 °C) (Oral)   Resp 18   Ht 5' 11\" (1.803 m)   Wt 158 lb 1.1 oz (71.7 kg)   SpO2 96%   BMI 22.05 kg/m²   General appearance: alert, appears stated age and cooperative  Skin: Skin color, texture, turgor normal.   HEENT: Head: Normocephalic, no lesions, without obvious abnormality.   Neck: supple  Lungs: clear to auscultation bilaterally  Heart: regular rate and rhythm, S1, S2 normal, no murmur, click, rub or gallop  Abdomen: soft, non-tender; bowel sounds normal; no masses,  no organomegaly  Extremities: -+++rue SWELLING from IV F infiltrating.+pulse  Neurologic: Mental status: Alert, oriented, thought content appropriate      Assessment & Plan: Syncope and collapse-recurrent, likely secondary to dehydration. CT head and CT cervical spine  Neg  -ct fluids      Recent gastroenteritis-seems resolved  -supportive care    SURY on CKD stage 3-baseline Cr 1.8  -resolving with fluids. Holding Hyzaar for now    RUE swelling  -elevate with pole-discussed with RN  -CHANGE iv SITE    Right toe pain following syncope, imaging studies with soft tissue swelling but no open wounds or fractures  -pain control    Essential hypertension  -ct home meds-hold ARB    Tobacco use  Nicotine patch ,Smoking cessation counseling            DVT Prophylaxis: Lovenox  Diet: DIET GENERAL;  Dietary Nutrition Supplements: Standard High Calorie Oral Supplement  Code Status: Full Code     PT/OT Eval Status: As tolerated    Wen Krishnan MD

## 2019-12-30 NOTE — PROGRESS NOTES
Bathing: Supervision;Setup(abbreviated sp bath standing at sink, pt req supv for safety)  LE Dressing: Supervision;Contact guard assistance(CGA while tying pants)  Toileting: Supervision  Tone RUE  RUE Tone: Normotonic  Tone LUE  LUE Tone: Normotonic  Coordination  Movements Are Fluid And Coordinated: Yes  Quality of Movement Other  Comment: noted R hand lagging mvmt v. L hand - pt reporting \"it's been like that\"              Cognition  Overall Cognitive Status: WFL                 LUE AROM (degrees)  LUE AROM : WFL  RUE AROM (degrees)  RUE AROM : WFL                      Plan   Plan  Times per week: 2-5  Times per day: Daily  Current Treatment Recommendations: Endurance Training, Patient/Caregiver Education & Training, Self-Care / ADL, Functional Mobility Training, Safety Education & Training      AM-PAC Score        AM-PAC Inpatient Daily Activity Raw Score: 19 (12/30/19 0915)  AM-PAC Inpatient ADL T-Scale Score : 40.22 (12/30/19 0915)  ADL Inpatient CMS 0-100% Score: 42.8 (12/30/19 0915)  ADL Inpatient CMS G-Code Modifier : CK (12/30/19 0915)    Goals  Short term goals  Time Frame for Short term goals: discharge  Short term goal 1: pt will complete don LB clothing Denny  Short term goal 2: pt will complete toilet transfer Denny  Patient Goals   Patient goals : to go home       Therapy Time   Individual Concurrent Group Co-treatment   Time In 0802         Time Out 0848         Minutes 46         Timed Code Treatment Minutes: 31 Minutes   Total treatment time: 15min (eval) + 31min (treatment)=46min  If patient discharges prior to next therapy session, this note will serve as discharge documentation.     Connor Siu, OTR/L [id number]

## 2019-12-30 NOTE — ED PROVIDER NOTES
and is neurologically intact.     Ute Maxwell MD   [facility]       Carlito Duffy MD  12/29/19 2490

## 2019-12-30 NOTE — ED PROVIDER NOTES
[address] [facility] RESIDENT NOTE     Date of evaluation: 12/29/2019    ADDENDUM:      Care of this patient was assumed from Dr. Mei Almaguer. The patient was seen for Dizziness (Pt LOC last week and injuried his right foot. Pt now complaining of dizziness, nausea, vomiting, and diarrhea. ); Fever; Fall; and Foot Injury  . The patient's initial evaluation and plan have been discussed with the prior provider who initially evaluated the patient. Nursing Notes, Past Medical Hx, Past Surgical Hx, Social Hx, Allergies, and Family Hx were all reviewed. Diagnostic Results     RADIOLOGY:  XR TOE RIGHT (MIN 2 VIEWS)   Final Result      No sign of any acute fracture or radiopaque foreign body. Soft tissue swelling overlying the second toe distally but no discrete cortical bony defect, limited as described above       XR CHEST STANDARD (2 VW)   Final Result      No acute process or active consolidation, slightly tortuous aorta noted.       .      CT CERVICAL SPINE WO CONTRAST    (Results Pending)       LABS:   Results for orders placed or performed during the hospital encounter of 12/29/19   Rapid Flu Swab   Result Value Ref Range    Rapid Influenza A Ag Negative Negative    Rapid Influenza B Ag Negative Negative   CBC Auto Differential   Result Value Ref Range    WBC 7.0 4.0 - 11.0 K/uL    RBC 4.41 4.20 - 5.90 M/uL    Hemoglobin 14.2 13.5 - 17.5 g/dL    Hematocrit 43.0 40.5 - 52.5 %    MCV 97.5 80.0 - 100.0 fL    MCH 32.2 26.0 - 34.0 pg    MCHC 33.0 31.0 - 36.0 g/dL    RDW 12.9 12.4 - 15.4 %    Platelets 504 047 - 007 K/uL    MPV 10.9 (H) 5.0 - 10.5 fL    Neutrophils % 34.1 %    Lymphocytes % 53.0 %    Monocytes % 9.8 %    Eosinophils % 2.4 %    Basophils % 0.7 %    Neutrophils Absolute 2.4 1.7 - 7.7 K/uL    Lymphocytes Absolute 3.7 1.0 - 5.1 K/uL    Monocytes Absolute 0.7 0.0 - 1.3 K/uL    Eosinophils Absolute 0.2 0.0 - 0.6 K/uL    Basophils Absolute 0.0 0.0 - 0.2 K/uL

## 2019-12-30 NOTE — PROGRESS NOTES
Physical Therapy    Facility/Department: [facility] 5T ORTHO/NEURO  Initial Assessment & Treatment     NAME: Monisha Aguirre. : 1950  MRN: [id number]    Date of Service: 2019    Discharge Recommendations:  Monisha Aguirre. scored a 18/24 on the AM-PAC short mobility form. Current research shows that an AM-PAC score of 18 or greater is typically associated with a discharge to the patient's home setting. Based on the patients AM-PAC score and their current functional mobility deficits, it is recommended that the patient have 2-3 sessions per week of Physical Therapy at d/c to increase the patients independence. PT Equipment Recommendations  Equipment Needed: Yes  Mobility Devices: Sergio Cram: Rolling    Assessment   Body structures, Functions, Activity limitations: Decreased functional mobility ; Decreased posture;Decreased endurance;Decreased strength;Decreased safe awareness; Increased pain;Decreased balance  Assessment: Rachana Reynolds was agreeable to PT assessment today & tolerated with no adverse events. Pt demo's the ability to safely mobilize on level surfaces with use of RW & CGA<>S. No overt LOB noted during OOB mobility assessment however pt slightly unsteady with mobility & endorsing R toe pain therefore recommended use of RW at this time compared to pt's typical [facility] for mobility. Pt plans to d/c home with support from family. Treatment Diagnosis: Weakness   Prognosis: Good  Decision Making: Medium Complexity  PT Education: PT Role;General Safety;Gait Training;Equipment;Transfer Training;Functional Mobility Training  Patient Education: Pt v.u   Barriers to Learning: n/a   REQUIRES PT FOLLOW UP: Yes  Activity Tolerance  Activity Tolerance: Patient Tolerated treatment well       Patient Diagnosis(es): The encounter diagnosis was Syncope and collapse.     has a past medical history of Hypertension, Low back pain, and Neck fracture ([facility] [address]    has a past surgical history that includes Total hip Cognition  Overall Cognitive Status: WNL    Objective     Observation/Palpation  Posture: Fair    AROM RLE (degrees)  RLE AROM: WNL  AROM LLE (degrees)  LLE AROM : WNL  Strength RLE  Comment: At least 3+/5 as observed through functional mobility. Strength LLE  Comment: At least 3+/5 as observed through functional mobility. Tone RLE  RLE Tone: Normotonic  Tone LLE  LLE Tone: Normotonic  Motor Control  Gross Motor?: WNL  Coordination  Rapid Alternating Movements: Normal  Sensation  Overall Sensation Status: WNL     Bed mobility  Supine to Sit: Contact guard assistance(with hand railing. +time for transition & cues for PLB 2/2 pt demo's valsalva with transition. )    Transfers  Sit to Stand: Supervision(up to RW. VC for hand placement 2/2 pt attempting to pull from AD. )  Stand to sit: Supervision(with cues for positioning & improved safety. )     Ambulation  Ambulation?: Yes  WB Status: n/a   More Ambulation?: No  Ambulation 1  Surface: level tile  Device: Rolling Walker  Assistance: Contact guard assistance;Supervision  Quality of Gait: forward rounded shoulders, dec'd B foot clearance with a slow shuffling pattern. No overt LOB   Distance: 20' x 2 repetitions   Comments: VC to increase B foot clearance & occassional cues for RW management/safety. Stairs/Curb  Stairs?: No     Balance  Posture: Fair  Sitting - Static: (S at EOB )  Sitting - Dynamic: (S at EOB )  Standing - Static: (S with RW )  Standing - Dynamic: (CGA<>S with RW )        Plan   Plan  Times per week: 2-5  Current Treatment Recommendations: Strengthening, Gait Training, Patient/Caregiver Education & Training, Stair training, Equipment Evaluation, Education, & procurement, Balance Training, Pain Management, Functional Mobility Training, Home Exercise Program, Endurance Training, Transfer Training, Safety Education & Training  Safety Devices  Type of devices:  All fall risk precautions in place, Left in chair, Call light within reach, Chair alarm in place, Nurse notified  Restraints  Initially in place: No    AM-PAC Score  AM-PAC Inpatient Mobility Raw Score : 18 (12/30/19 1046)  AM-PAC Inpatient T-Scale Score : 43.63 (12/30/19 1046)  Mobility Inpatient CMS 0-100% Score: 46.58 (12/30/19 1046)  Mobility Inpatient CMS G-Code Modifier : CK (12/30/19 1046)          Goals  Short term goals  Time Frame for Short term goals: To be met by DC  Short term goal 1: Pt will complete bed mobility independently. Short term goal 2: Pt will txf sit<>stand independently. Short term goal 3: Pt will ambulate 48' with S & RW with no LOB. Short term goal 4: Pt will tolerate stair assessment. Patient Goals   Patient goals :  To return home & decrease pain       Therapy Time   Individual Concurrent Group Co-treatment   Time In       0802   Time Out       0847   Minutes       45   Timed Code Treatment Minutes: [address], PT

## 2019-12-30 NOTE — PROGRESS NOTES
Nutrition Assessment (Low Risk)    Type and Reason for Visit: Initial    Nutrition Recommendations:   Continue with current General Diet  Continue with ONS TID: Standard/ High Calorie. Nutrition Assessment:  Assessed d/t Malnutrition Screening Tool Score 2 given weight loss and decreased appetite. Admitted for syncope. Pt was having vomiting and  having diarrhea over the past 3 to 4 days. Pt reported good appetite since admission, tolerating general diet and having ONS ordered by MD. Patient assessed for nutritional risk. Deemed to be at low risk at this time. Will continue to monitor for changes in status.      Malnutrition Assessment:  · Malnutrition Status: No malnutrition    Nutrition Risk Level   Risk Level: Low    Nutrition Diagnosis:   · Problem: No nutrition diagnosis at this time    Nutrition Intervention:  Food and/or Delivery: Continue current diet, Continue current ONS  Nutrition Education/Counseling/Coordination of Care:  Continued Inpatient Monitoring      Electronically signed by Vickie Hudson RD, JEREMY on 12/30/19 at 11:14 AM    Contact Number: [phone]

## 2019-12-30 NOTE — CARE COORDINATION
Case Management Assessment           Initial Evaluation                Date / Time of Evaluation: 12/30/2019 4:39 PM                 Assessment Completed by: Alvaro Howell     Spoke with patient regarding discharge needs. Pt is from home with his sister and brother. They have a one level home. He does not feel that he will need home care at discharge. He does need a rolling walker and referral was made with Gabino from [facility] to deliver prior to discharge. His main concern is his toenail and he kept asking if they could just remove it then maybe he would be able to walk better. I spoke with the nurse to ask Dr. Pedro Sparks and she was going to message her. Patient Name: Jeff Burnett. YOB: 1950  Diagnosis: Acute renal failure (ARF) ([facility] [address] [N17.9]     Date / Time: 12/29/2019  5:12 PM    Patient Admission Status: Inpatient    If patient is discharged prior to next notation, then this note serves as note for discharge by case management.      Current PCP: Pamela Washburn MD  Clinic Patient: No    Chart Reviewed: Yes  Patient/ Family Interviewed: Yes    Initial assessment completed at bedside with: patient    Hospitalization in the last 30 days: No    Emergency Contacts:  Extended Emergency Contact Information  Primary Emergency Contact: Beatrice Maxwell  Home Phone: [phone]  Relation: Brother/Sister  Secondary Emergency Contact: Beatrice Maxwell  Home Phone: [phone]  Relation: Brother/Sister    Advance Directives:   Code Status: Full Code    Healthcare Power of : No  Agent: NA  Contact Number: NA    Financial  Payor: Shaila Guzman / Plan: Jeanie Chávez / Product Type: *No Product type* /     Pre-cert required for SNF: Yes    Pharmacy    [id number] - [address] [address]  Phone: [phone] Fax: [phone]    [facility] [address], [facility] [address] [place],[address] Floor Junior Paul Cely Antonio [phone] Arianne Macias [phone]  [address] [place] [address]  Phone: [phone] Fax: [phone]      Potential assistance Purchasing Medications: Potential Assistance Purchasing Medications: No  Does Patient want to participate in local refill/ meds to beds program?: Not Assessed    Meds To Beds General Rules:  1. Can ONLY be done Monday- Friday between 8:30am-5pm  2. Prescription(s) must be in pharmacy by 3pm to be filled same day  3. Copy of patient's insurance/ prescription drug card and patient face sheet must be sent along with the prescription(s)  4. Cost of Rx cannot be added to hospital bill. If financial assistance is needed, please contact unit  or ;  or  CANNOT provide pharmacy voucher for patients co-pays  5.  Patients can then  the prescription on their way out of the hospital at discharge, or pharmacy can deliver to the bedside if staff is available. (payment due at time of pick-up or delivery - cash, check, or card accepted)     Able to afford home medications/ co-pay costs: Yes    ADLS  Support Systems: Family Members    PT AM-PAC: 18 /24  OT AM-PAC: 19 /24    [place]: single level home  Steps: 3    Plans to RETURN to current housing: Yes  Barriers to RETURNING to current housing: none noted    Durable Medical Equipment  DME Provider: [facility]  Equipment: walker      DISCHARGE PLAN:  Disposition: Home- No Services Needed    Transportation PLAN for discharge: family     Factors facilitating achievement of predicted outcomes: Family support, Cooperative and Pleasant    Barriers to discharge: Pain    Additional Case Management Notes: NA    The Plan for Transition of Care is related to the following treatment goals of Acute renal failure (ARF) ([facility] [address] [N17.9]    The Patient and/or patient representative Jeremiah Pineda and his family were provided with a choice of provider and agrees with the discharge plan Not Indicated    Freedom of choice list was provided with basic dialogue that supports the patient's individualized plan of care/goals and shares the quality data associated with the providers.  Not Indicated      Care Transition patient: Ally Howell RN  [facility]  Case Management Department  Ph: [phone]   Fax: [phone]

## 2019-12-30 NOTE — H&P
noted. .          ASSESSMENT:    Active Hospital Problems    Diagnosis Date Noted    Acute renal failure (ARF) ([facility] [address] [N17.9] 12/29/2019   Syncope and collapse-recurrent, likely secondary to dehydration. CT head and CT cervical spine as above  Recent gastroenteritis-seems resolved  SURY on CKD  Right toe pain following syncope, imaging studies with soft tissue swelling but no open wounds or fractures  Essential hypertension  Tobacco use    PLAN:  Continue IV hydration as ordered  Monitor renal function and electrolytes  PT/OT  Pain relief  Hold Hyzaar for now  Start on IV antihypertensives PRN  Supportive therapy  Nicotine patch offered as requested by the patient    DVT Prophylaxis: Lovenox  Diet: DIET GENERAL;  Dietary Nutrition Supplements: Standard High Calorie Oral Supplement  Code Status: Full Code    PT/OT Eval Status: As tolerated    Dispo -GMF with telemetry       Castillo Shepard MD    Thank you C Florecita Acuna MD for the opportunity to be involved in this patient's care. If you have any questions or concerns please feel free to contact me at [phone].

## 2019-12-30 NOTE — PROGRESS NOTES
Small BM x1. Soft and formed. Not able to send C. Diff sample at this time. Stool not able to conform to container. Will continue to monitor.

## 2019-12-31 LAB
ALBUMIN SERPL-MCNC: 3.4 G/DL (ref 3.4–5)
ANION GAP SERPL CALCULATED.3IONS-SCNC: 17 MMOL/L (ref 3–16)
BUN BLDV-MCNC: 43 MG/DL (ref 7–20)
CALCIUM SERPL-MCNC: 9.3 MG/DL (ref 8.3–10.6)
CHLORIDE BLD-SCNC: 103 MMOL/L (ref 99–110)
CO2: 18 MMOL/L (ref 21–32)
CREAT SERPL-MCNC: 2.2 MG/DL (ref 0.8–1.3)
CREATININE URINE: 121.3 MG/DL (ref 39–259)
GFR AFRICAN AMERICAN: 36
GFR NON-AFRICAN AMERICAN: 30
GLUCOSE BLD-MCNC: 109 MG/DL (ref 70–99)
MICROALBUMIN UR-MCNC: <1.2 MG/DL
MICROALBUMIN/CREAT UR-RTO: NORMAL MG/G (ref 0–30)
PHOSPHORUS: 2.9 MG/DL (ref 2.5–4.9)
POTASSIUM SERPL-SCNC: 5.1 MMOL/L (ref 3.5–5.1)
PROTEIN PROTEIN: 0.01 G/DL
PROTEIN PROTEIN: 9 MG/DL
SODIUM BLD-SCNC: 138 MMOL/L (ref 136–145)

## 2019-12-31 PROCEDURE — 36415 COLL VENOUS BLD VENIPUNCTURE: CPT

## 2019-12-31 PROCEDURE — 6360000002 HC RX W HCPCS: Performed by: INTERNAL MEDICINE

## 2019-12-31 PROCEDURE — 82043 UR ALBUMIN QUANTITATIVE: CPT

## 2019-12-31 PROCEDURE — 6370000000 HC RX 637 (ALT 250 FOR IP): Performed by: INTERNAL MEDICINE

## 2019-12-31 PROCEDURE — 84156 ASSAY OF PROTEIN URINE: CPT

## 2019-12-31 PROCEDURE — 99221 1ST HOSP IP/OBS SF/LOW 40: CPT | Performed by: INTERNAL MEDICINE

## 2019-12-31 PROCEDURE — 84166 PROTEIN E-PHORESIS/URINE/CSF: CPT

## 2019-12-31 PROCEDURE — 82570 ASSAY OF URINE CREATININE: CPT

## 2019-12-31 PROCEDURE — 1200000000 HC SEMI PRIVATE

## 2019-12-31 PROCEDURE — 80069 RENAL FUNCTION PANEL: CPT

## 2019-12-31 PROCEDURE — 97535 SELF CARE MNGMENT TRAINING: CPT

## 2019-12-31 RX ORDER — SODIUM BICARBONATE 650 MG/1
650 TABLET ORAL 3 TIMES DAILY
Status: DISCONTINUED | OUTPATIENT
Start: 2019-12-31 | End: 2020-01-01

## 2019-12-31 RX ORDER — POLYETHYLENE GLYCOL 3350 17 G/17G
17 POWDER, FOR SOLUTION ORAL DAILY
Status: DISCONTINUED | OUTPATIENT
Start: 2019-12-31 | End: 2020-01-08 | Stop reason: HOSPADM

## 2019-12-31 RX ADMIN — HEPARIN SODIUM 5000 UNITS: 5000 INJECTION INTRAVENOUS; SUBCUTANEOUS at 06:26

## 2019-12-31 RX ADMIN — SODIUM BICARBONATE 650 MG: 650 TABLET ORAL at 21:13

## 2019-12-31 RX ADMIN — HEPARIN SODIUM 5000 UNITS: 5000 INJECTION INTRAVENOUS; SUBCUTANEOUS at 21:13

## 2019-12-31 RX ADMIN — ACETAMINOPHEN 650 MG: 325 TABLET ORAL at 19:24

## 2019-12-31 RX ADMIN — TRAMADOL HYDROCHLORIDE 50 MG: 50 TABLET, FILM COATED ORAL at 07:33

## 2019-12-31 RX ADMIN — HEPARIN SODIUM 5000 UNITS: 5000 INJECTION INTRAVENOUS; SUBCUTANEOUS at 13:31

## 2019-12-31 RX ADMIN — ACETAMINOPHEN 650 MG: 325 TABLET ORAL at 23:53

## 2019-12-31 RX ADMIN — ACETAMINOPHEN 650 MG: 325 TABLET ORAL at 01:52

## 2019-12-31 RX ADMIN — SODIUM BICARBONATE 650 MG: 650 TABLET ORAL at 13:31

## 2019-12-31 RX ADMIN — SODIUM BICARBONATE 650 MG: 650 TABLET ORAL at 16:49

## 2019-12-31 RX ADMIN — SODIUM BICARBONATE 650 MG: 650 TABLET ORAL at 07:33

## 2019-12-31 ASSESSMENT — PAIN DESCRIPTION - FREQUENCY
FREQUENCY: CONTINUOUS

## 2019-12-31 ASSESSMENT — PAIN DESCRIPTION - PAIN TYPE
TYPE: ACUTE PAIN

## 2019-12-31 ASSESSMENT — PAIN SCALES - GENERAL
PAINLEVEL_OUTOF10: 10
PAINLEVEL_OUTOF10: 5
PAINLEVEL_OUTOF10: 6
PAINLEVEL_OUTOF10: 0
PAINLEVEL_OUTOF10: 6
PAINLEVEL_OUTOF10: 0

## 2019-12-31 ASSESSMENT — PAIN DESCRIPTION - PROGRESSION
CLINICAL_PROGRESSION: NOT CHANGED
CLINICAL_PROGRESSION: GRADUALLY WORSENING
CLINICAL_PROGRESSION: GRADUALLY WORSENING

## 2019-12-31 ASSESSMENT — PAIN DESCRIPTION - ORIENTATION
ORIENTATION: RIGHT;LEFT
ORIENTATION: RIGHT
ORIENTATION: RIGHT

## 2019-12-31 ASSESSMENT — PAIN - FUNCTIONAL ASSESSMENT
PAIN_FUNCTIONAL_ASSESSMENT: PREVENTS OR INTERFERES SOME ACTIVE ACTIVITIES AND ADLS

## 2019-12-31 ASSESSMENT — PAIN DESCRIPTION - DESCRIPTORS
DESCRIPTORS: ACHING

## 2019-12-31 ASSESSMENT — PAIN DESCRIPTION - ONSET
ONSET: ON-GOING

## 2019-12-31 ASSESSMENT — PAIN DESCRIPTION - LOCATION
LOCATION: ARM;FOOT
LOCATION: SHOULDER;ARM
LOCATION: ARM

## 2019-12-31 NOTE — PROGRESS NOTES
Activity Raw Score: 19 (12/31/19 1518)  AM-PAC Inpatient ADL T-Scale Score : 40.22 (12/31/19 1518)  ADL Inpatient CMS 0-100% Score: 42.8 (12/31/19 1518)  ADL Inpatient CMS G-Code Modifier : CK (12/31/19 1518)    Goals (as determined and assessed by primary OT)  Short term goals  Time Frame for Short term goals: discharge  Short term goal 1: pt will complete don LB clothing Denny - ongoing  Short term goal 2: pt will complete toilet transfer Denny - ongoing  Patient Goals   Patient goals : to go home       Therapy Time   Individual Concurrent Group Co-treatment   Time In 1500         Time Out 1517         Minutes 17         Timed Code Treatment Minutes: 17 Minutes   Total Treatment Minutes: 16 min    [address] [place] [address], Ne, MAC/L

## 2019-12-31 NOTE — PLAN OF CARE
Problem: Falls - Risk of:  Goal: Will remain free from falls  Description  Will remain free from falls  12/31/2019 0935 by Suleiman Kent RN  Outcome: Ongoing  Note:   Patient has remained free from falls. Bed is low and locked, bed alarm on, side rails up 2/4, non skid socks on patient. Call light and bedside table are within reach. Patient calls out appropriately. Will continue to monitor. Problem: Daily Care:  Goal: Daily care needs are met  Description  Daily care needs are met  Outcome: Ongoing  Note:   Patients needs are met at this time. Will continue to monitor. Problem: Pain:  Goal: Pain level will decrease  Description  Pain level will decrease  Outcome: Ongoing  Note:   Patient complained of pain to right foot and bilateral arms. Patient medicated per MAR. Patient satisfied at reassessment. Will continue to monitor.

## 2019-12-31 NOTE — CONSULTS
Daily  diazepam (VALIUM) tablet 10 mg, 10 mg, Oral, Q12H PRN  0.9 % sodium chloride infusion, , Intravenous, Continuous  sodium bicarbonate tablet 650 mg, 650 mg, Oral, TID  acetaminophen (TYLENOL) tablet 650 mg, 650 mg, Oral, Once  sodium chloride flush 0.9 % injection 10 mL, 10 mL, Intravenous, 2 times per day  sodium chloride flush 0.9 % injection 10 mL, 10 mL, Intravenous, PRN  magnesium hydroxide (MILK OF MAGNESIA) 400 MG/5ML suspension 30 mL, 30 mL, Oral, Daily PRN  ondansetron (ZOFRAN) injection 4 mg, 4 mg, Intravenous, Q6H PRN  heparin (porcine) injection 5,000 Units, 5,000 Units, Subcutaneous, 3 times per day  traMADol (ULTRAM) tablet 50 mg, 50 mg, Oral, Q12H PRN  acetaminophen (TYLENOL) tablet 650 mg, 650 mg, Oral, Q4H PRN  nicotine (NICODERM CQ) 21 MG/24HR 1 patch, 1 patch, Transdermal, Daily       Vitals :     Vitals:    12/31/19 1422   BP: 125/76   Pulse: 106   Resp: 17   Temp: 97.5 °F (36.4 °C)   SpO2: 93%       I & O :       Intake/Output Summary (Last 24 hours) at 12/31/2019 1523  Last data filed at 12/31/2019 1411  Gross per 24 hour   Intake 720 ml   Output 800 ml   Net -80 ml        Physical Examination :     General appearance: Anxious- no, distressed- no, in good spirits- yes    HEENT: Lips- normal, teeth- ok , oral mucosa- moist  Neck : Mass- no, appears symmetrical, JVD- not visible  Respiratory: Respiratory effort-okay, wheeze- no, crackles -none  Cardiovascular:  Ausculation- No M/R/G, Edema right upper extremities edema due to infiltration  Abdomen: visible mass- no, distention- no, scar- no, tenderness- no                            hepatosplenomegaly-  no  Musculoskeletal:  clubbing no,cyanosis- no , digital ischemia- no                           muscle strength- grossly normal , tone - grossly normal  Skin: rashes- no , ulcers- no, induration- no, tightening - no  Psychiatric:  Judgement and insight- normal           AAO X 3       LABS:     Recent Labs     12/29/19  6029 12/30/19  6543

## 2019-12-31 NOTE — CONSULTS
[address]   Cardiac Electrophysiology Consultation   Date: 12/31/2019  Admit Date:  12/29/2019  Reason for Consultation: Syncope / Sinus pause  Consult Requesting Physician: Tiff Grimes MD     Chief Complaint   Patient presents with    Dizziness     Pt LOC last week and injuried his right foot. Pt now complaining of dizziness, nausea, vomiting, and diarrhea.  Fever    Fall    Foot Injury     HPI: Kristie Price is a 78 y.o. male with PMH of HTN, LBA, recurrent syncope was admitted to the hospital due to recurrent falls in the past week. He is being treated with IVF due to SURY. In the tele, there was a concern for \"sinus pause\" and hence cardiology was consulted. He had multiple falls where he describes that his [place] would give up\" and then lands on his legs. He denied any LOC to me. He feels that his legs are otherwise strong and sometimes he feels very weak and legs just giving away he denies any lightheadedness / dizzy spells / chest pain / SOB. He denies any episodes when he is sitting or lying. Past Medical History:   Diagnosis Date    Hypertension 5/18/2010    Low back pain 5/18/2010    Neck fracture [facility])         Past Surgical History:   Procedure Laterality Date    FRACTURE SURGERY      HIP SURGERY      left    NECK SURGERY      TOTAL HIP ARTHROPLASTY      right       No Known Allergies    Social History:  Reviewed. reports that he has been smoking cigarettes. He has a 7.50 pack-year smoking history. He has never used smokeless tobacco. He reports that he does not drink alcohol or use drugs. Family History:  Reviewed. family history includes Cancer in his father and mother; High Blood Pressure in his father. No premature CAD. Review of System:  All other systems reviewed except for that noted above.  Pertinent negatives and positives are:     Objective      · General: negative for fever, chills   · Ophthalmic ROS: negative for - eye pain or loss of (ARF) ([facility] [N17.9] 12/29/2019         Recommendation (s):    His clinical history is concerning for orthostasis; agree with gentle hydration    Tele showed Mobitz Type 1 block - benign; no intervention needed    Check TTE    Will arrange a out patient monitor for 2 weeks. EP will sign off    Pls call with any questions    D/w hospitalist Dr. Jaida Wilson    Thank you for allowing me to participate in the care of Cate Mata. . If you have any questions/comments, please do not hesitate to contact us.       Yohannes Kohler MD   Cardiac Electrophysiology  [address]

## 2019-12-31 NOTE — PLAN OF CARE
Problem: Falls - Risk of:  Goal: Will remain free from falls  Description  Will remain free from falls  Outcome: Ongoing  Note:   Patient will remain free from falls. Fall precautions in place. Bed locked and in lowest position. Bed alarm on, nonskid socks on. Call light within reach. Problem: Pain:  Goal: Patient's pain/discomfort is manageable  Description  Patient's pain/discomfort is manageable  Outcome: Ongoing  Note:   Patient currently rating pain in arms 10/10. Patient medicated per MAR orders. Right arm has various blisters and is being elevated with pillows. Ice is also being applied with a towel to the surrounding area for comfort.  Will continue to monitor

## 2019-12-31 NOTE — CARE COORDINATION
Consult to nephrology noted. No d/c planned for today. Gabino reyna [place] delivered the walker to the patient.      Electronically signed by Agustin Still RN on 12/31/2019 at 3:12 PM  [phone]

## 2019-12-31 NOTE — PROGRESS NOTES
Patient is alert and orientated. VSS. Blisters to right inner forearm remain unchanged; one open, one larger blister and multiple smaller ones. Pain being controlled with medications per MAR orders. MD saw patient regarding blisters, no new orders placed. MD notified of loss of IV access and verbalized that it is ok for patient to be off of fluids at this time. Fall precautions in place. Call light within reach. Will continue to monitor.

## 2019-12-31 NOTE — PROGRESS NOTES
collapse-recurrent, likely secondary to dehydration. CT head and CT cervical spine  Neg  -ct fluids      Recent gastroenteritis-seems resolved  -supportive care    SURY on CKD stage 3-baseline Cr 1.8  -resolving with fluids. Holding Hyzaar for now    RUE swelling  -elevate with pole-discussed with RN  -CHANGE iv SITE    Right toe pain following syncope, imaging studies with soft tissue swelling but no open wounds or fractures  -pain control    Essential hypertension  -ct home meds-hold ARB    Tobacco use  Nicotine patch ,Smoking cessation counseling            DVT Prophylaxis: Lovenox  Diet: DIET GENERAL;  Dietary Nutrition Supplements: Standard High Calorie Oral Supplement  Code Status: Full Code     PT/OT Eval Status: As tolerated    Harmony Leblanc MD

## 2020-01-01 ENCOUNTER — APPOINTMENT (OUTPATIENT)
Dept: CT IMAGING | Age: 70
DRG: 243 | End: 2020-01-01
Payer: MEDICARE

## 2020-01-01 LAB
ALBUMIN SERPL-MCNC: 3.6 G/DL (ref 3.1–4.9)
ALBUMIN SERPL-MCNC: 3.6 G/DL (ref 3.4–5)
ALPHA-1-GLOBULIN: 0.3 G/DL (ref 0.2–0.4)
ALPHA-2-GLOBULIN: 1.2 G/DL (ref 0.4–1.1)
ANION GAP SERPL CALCULATED.3IONS-SCNC: 13 MMOL/L (ref 3–16)
BETA GLOBULIN: 1.1 G/DL (ref 0.9–1.6)
BUN BLDV-MCNC: 40 MG/DL (ref 7–20)
CALCIUM SERPL-MCNC: 9.6 MG/DL (ref 8.3–10.6)
CHLORIDE BLD-SCNC: 103 MMOL/L (ref 99–110)
CO2: 26 MMOL/L (ref 21–32)
CREAT SERPL-MCNC: 2 MG/DL (ref 0.8–1.3)
GAMMA GLOBULIN: 2 G/DL (ref 0.6–1.8)
GFR AFRICAN AMERICAN: 40
GFR NON-AFRICAN AMERICAN: 33
GLUCOSE BLD-MCNC: 107 MG/DL (ref 70–99)
KAPPA, FREE LIGHT CHAINS, SERUM: 93.84 MG/L (ref 3.3–19.4)
KAPPA/LAMBDA RATIO: 1.94 (ref 0.26–1.65)
KAPPA/LAMBDA TEST COMMENT: ABNORMAL
LAMBDA, FREE LIGHT CHAINS, SERUM: 48.3 MG/L (ref 5.71–26.3)
PHOSPHORUS: 2.9 MG/DL (ref 2.5–4.9)
POTASSIUM SERPL-SCNC: 3.8 MMOL/L (ref 3.5–5.1)
SODIUM BLD-SCNC: 142 MMOL/L (ref 136–145)
TOTAL PROTEIN: 8.1 G/DL (ref 6.4–8.2)

## 2020-01-01 PROCEDURE — 80069 RENAL FUNCTION PANEL: CPT

## 2020-01-01 PROCEDURE — 6360000002 HC RX W HCPCS: Performed by: INTERNAL MEDICINE

## 2020-01-01 PROCEDURE — 1200000000 HC SEMI PRIVATE

## 2020-01-01 PROCEDURE — 6370000000 HC RX 637 (ALT 250 FOR IP): Performed by: INTERNAL MEDICINE

## 2020-01-01 PROCEDURE — 73200 CT UPPER EXTREMITY W/O DYE: CPT

## 2020-01-01 PROCEDURE — 36415 COLL VENOUS BLD VENIPUNCTURE: CPT

## 2020-01-01 RX ORDER — TRAMADOL HYDROCHLORIDE 50 MG/1
50 TABLET ORAL EVERY 6 HOURS PRN
Status: DISCONTINUED | OUTPATIENT
Start: 2020-01-01 | End: 2020-01-08 | Stop reason: HOSPADM

## 2020-01-01 RX ORDER — ONDANSETRON 4 MG/1
4 TABLET, ORALLY DISINTEGRATING ORAL EVERY 6 HOURS PRN
Status: DISCONTINUED | OUTPATIENT
Start: 2020-01-01 | End: 2020-01-08 | Stop reason: HOSPADM

## 2020-01-01 RX ADMIN — TRAMADOL HYDROCHLORIDE 50 MG: 50 TABLET ORAL at 10:11

## 2020-01-01 RX ADMIN — ONDANSETRON 4 MG: 4 TABLET, ORALLY DISINTEGRATING ORAL at 19:21

## 2020-01-01 RX ADMIN — TRAMADOL HYDROCHLORIDE 50 MG: 50 TABLET ORAL at 22:15

## 2020-01-01 RX ADMIN — HEPARIN SODIUM 5000 UNITS: 5000 INJECTION INTRAVENOUS; SUBCUTANEOUS at 22:15

## 2020-01-01 RX ADMIN — POLYETHYLENE GLYCOL 3350 17 G: 17 POWDER, FOR SOLUTION ORAL at 09:21

## 2020-01-01 RX ADMIN — ACETAMINOPHEN 650 MG: 325 TABLET ORAL at 21:00

## 2020-01-01 RX ADMIN — ACETAMINOPHEN 650 MG: 325 TABLET ORAL at 12:46

## 2020-01-01 RX ADMIN — HEPARIN SODIUM 5000 UNITS: 5000 INJECTION INTRAVENOUS; SUBCUTANEOUS at 05:42

## 2020-01-01 RX ADMIN — ACETAMINOPHEN 650 MG: 325 TABLET ORAL at 06:57

## 2020-01-01 RX ADMIN — TRAMADOL HYDROCHLORIDE 50 MG: 50 TABLET ORAL at 16:22

## 2020-01-01 RX ADMIN — SODIUM BICARBONATE 650 MG: 650 TABLET ORAL at 09:21

## 2020-01-01 ASSESSMENT — PAIN DESCRIPTION - PAIN TYPE
TYPE: ACUTE PAIN

## 2020-01-01 ASSESSMENT — PAIN DESCRIPTION - PROGRESSION
CLINICAL_PROGRESSION: NOT CHANGED
CLINICAL_PROGRESSION: GRADUALLY WORSENING
CLINICAL_PROGRESSION: GRADUALLY WORSENING
CLINICAL_PROGRESSION: NOT CHANGED
CLINICAL_PROGRESSION: NOT CHANGED
CLINICAL_PROGRESSION: GRADUALLY WORSENING

## 2020-01-01 ASSESSMENT — PAIN DESCRIPTION - DESCRIPTORS
DESCRIPTORS: ACHING

## 2020-01-01 ASSESSMENT — PAIN SCALES - GENERAL
PAINLEVEL_OUTOF10: 10
PAINLEVEL_OUTOF10: 9
PAINLEVEL_OUTOF10: 6
PAINLEVEL_OUTOF10: 6
PAINLEVEL_OUTOF10: 0
PAINLEVEL_OUTOF10: 0
PAINLEVEL_OUTOF10: 2
PAINLEVEL_OUTOF10: 4
PAINLEVEL_OUTOF10: 4
PAINLEVEL_OUTOF10: 0
PAINLEVEL_OUTOF10: 9
PAINLEVEL_OUTOF10: 5
PAINLEVEL_OUTOF10: 6
PAINLEVEL_OUTOF10: 6

## 2020-01-01 ASSESSMENT — PAIN DESCRIPTION - FREQUENCY
FREQUENCY: CONTINUOUS
FREQUENCY: INTERMITTENT
FREQUENCY: INTERMITTENT
FREQUENCY: CONTINUOUS
FREQUENCY: INTERMITTENT
FREQUENCY: INTERMITTENT

## 2020-01-01 ASSESSMENT — PAIN DESCRIPTION - LOCATION
LOCATION: ARM;SHOULDER
LOCATION: HEAD;SHOULDER
LOCATION: ARM;SHOULDER

## 2020-01-01 ASSESSMENT — PAIN DESCRIPTION - ORIENTATION
ORIENTATION: RIGHT

## 2020-01-01 ASSESSMENT — PAIN DESCRIPTION - ONSET
ONSET: ON-GOING

## 2020-01-01 NOTE — PROGRESS NOTES
Patient alert and oriented x4, VSS - no fever this shift. Patient complains of pain to right arm and shoulder - medicated per [facility] with prn PO pain medication and repositioned for comfort. Largest blister to R arm remains intact - minimal drainage. Patient up to bathroom x1 assist with GB and cane - tolerated ambulation well. One soft bowel movement this shift. Patient had small nosebleed this morning lasting about 5 min that stopped without intervention. MD notified - hold 1400 dose of Heparin. Patient does not have IV access - MD is aware. All fall precautions in place. Will continue to monitor.

## 2020-01-01 NOTE — PROGRESS NOTES
Patient is A/O x4, VSS, and pain is being managed per the [facility]. Patient denies N/V at this time. Patient does not have IV access, MD is aware. Patient did have a low grade fever that has responded to ordered tylenol. Patient states that he is feeling better. Patient is voiding adequately using the urinal.  Will continue to monitor.

## 2020-01-01 NOTE — PLAN OF CARE
Problem: Falls - Risk of:  Goal: Will remain free from falls  Description  Will remain free from falls  Outcome: Ongoing  Note:   Patient will remain free from falls. Patient will use call light to notify staff of needs prior to exiting the bed. Problem: Safety:  Goal: Free from accidental physical injury  Description  Free from accidental physical injury  Outcome: Ongoing  Note:   Patient will remain free from accidental physical injury. Patient's bed will remain in lowest position with wheels locked and bed alarm engaged. Problem: Daily Care:  Goal: Daily care needs are met  Description  Daily care needs are met  Outcome: Ongoing  Note:   Patient will communicate with staff to ensure that daily care needs are being met to patient's satisfaction.

## 2020-01-01 NOTE — PLAN OF CARE
Problem: Falls - Risk of:  Goal: Will remain free from falls  Description  Will remain free from falls  1/1/2020 1256 by Ranulfo Melton RN  All fall precautions in place. Bed locked and in lowest position with alarm on. Over-bed table and personal belongings within reach. Patient instructed to use call light for assistance. Patient up x1 assist with GB and cane - tolerating ambulation well. Non-skids socks on. Will continue to monitor. Problem: Pain:  Goal: Patient's pain/discomfort is manageable  Description  Patient's pain/discomfort is manageable  Outcome: Ongoing   Patient complains of pain to right shoulder and arm. Patient medicated per MAR with PO prn pain medication. Patient satisfied at reassessment. Will continue to assess and monitor.

## 2020-01-01 NOTE — PROGRESS NOTES
HOSPITAL MEDICINE  - PROGRESS NOTE    Admit Date: 12/29/2019         Interval History: *78 y.o. male who presents after? Syncopal episode patient states that his legs gave away and he went down on his knees and unclear he lost consciousness.   -has been having a sinus infection with postnasal drip and was vomiting and  having diarrhea over the past 3 to 4 days  - has passed out 2-3 times over the past week. No  chest pain, shortness of breath, palpitations but has been feeling lightheaded prior during these episodes.     Sub   Fever overnight with T max 101. Now with rt shoulder pain and diff with ROM. NO ERYTHEMA. MILD SWELLING      Objective: afebrile    Diet: DIET GENERAL;  Dietary Nutrition Supplements: Standard High Calorie Oral Supplement      Data:   Scheduled Meds: Reviewed  Continuous Infusions:      Intake/Output Summary (Last 24 hours) at 1/1/2020 1434  Last data filed at 1/1/2020 1250  Gross per 24 hour   Intake 580 ml   Output 850 ml   Net -270 ml     CBC:   Recent Labs     12/30/19  0611   WBC 6.0   HGB 13.2*        BMP:  Recent Labs     01/01/20  0614      K 3.8      CO2 26   BUN 40*   CREATININE 2.0*   GLUCOSE 107*         Objective:   Vitals: /65   Pulse 94   Temp 98.2 °F (36.8 °C) (Oral)   Resp 16   Ht 5' 11\" (1.803 m)   Wt 158 lb 1.1 oz (71.7 kg)   SpO2 98%   BMI 22.05 kg/m²   General appearance: alert, appears stated age and cooperative  Skin: Skin color, texture, turgor normal.   HEENT: Head: Normocephalic, no lesions, without obvious abnormality.   Neck: supple  Lungs: clear to auscultation bilaterally  Heart: regular rate and rhythm, S1, S2 normal, no murmur, click, rub or gallop  Abdomen: soft, non-tender; bowel sounds normal; no masses,  no organomegaly  Extremities: - blister upper rt arm with mod swelling and decr ROM,rt shoulder  Neurologic: Mental status: Alert, oriented, thought content appropriate      Assessment & Plan:      Syncope and collapse-recurrent, likely secondary to dehydration. CT head and CT cervical spine  Neg  -ct fluids      Recent gastroenteritis-seems resolved  -supportive care    SURY on CKD stage 3-baseline Cr 1.8  -resolving with fluids. Holding Hyzaar for now    RUE swelling. Rt shoulder pain  -CT scan shoulder  Keep elevated    Right toe pain following syncope, imaging studies with soft tissue swelling but no open wounds or fractures  -pain control    Essential hypertension  -ct home meds-hold ARB    Tobacco use  Nicotine patch ,Smoking cessation counseling            DVT Prophylaxis: Lovenox  Diet: DIET GENERAL;  Dietary Nutrition Supplements: Standard High Calorie Oral Supplement  Code Status: Full Code     PT/OT Eval Status: As tolerated    Mey Ng MD

## 2020-01-01 NOTE — PROGRESS NOTES
traMADol (ULTRAM) tablet 50 mg, 50 mg, Oral, Q6H PRN  polyethylene glycol (GLYCOLAX) packet 17 g, 17 g, Oral, Daily  sodium bicarbonate tablet 650 mg, 650 mg, Oral, TID  diazepam (VALIUM) tablet 10 mg, 10 mg, Oral, Q12H PRN  acetaminophen (TYLENOL) tablet 650 mg, 650 mg, Oral, Once  sodium chloride flush 0.9 % injection 10 mL, 10 mL, Intravenous, 2 times per day  sodium chloride flush 0.9 % injection 10 mL, 10 mL, Intravenous, PRN  magnesium hydroxide (MILK OF MAGNESIA) 400 MG/5ML suspension 30 mL, 30 mL, Oral, Daily PRN  ondansetron (ZOFRAN) injection 4 mg, 4 mg, Intravenous, Q6H PRN  heparin (porcine) injection 5,000 Units, 5,000 Units, Subcutaneous, 3 times per day  acetaminophen (TYLENOL) tablet 650 mg, 650 mg, Oral, Q4H PRN  nicotine (NICODERM CQ) 21 MG/24HR 1 patch, 1 patch, Transdermal, Daily       Vitals :     Vitals:    01/01/20 0703   BP: 120/79   Pulse: 90   Resp: 16   Temp: 97.5 °F (36.4 °C)   SpO2: 98%       I & O :       Intake/Output Summary (Last 24 hours) at 1/1/2020 1138  Last data filed at 1/1/2020 1045  Gross per 24 hour   Intake 580 ml   Output 1050 ml   Net -470 ml        Physical Examination :     General appearance: Anxious- no, distressed- no, in good spirits- yes    HEENT: Lips- normal, teeth- ok , oral mucosa- moist  Neck : Mass- no, appears symmetrical, JVD- not visible  Respiratory: Respiratory effort-okay, wheeze- no, crackles -none  Cardiovascular:  Ausculation- No M/R/G, Edema right upper extremities edema due to infiltration  Abdomen: visible mass- no, distention- no, scar- no, tenderness- no                            hepatosplenomegaly-  no  Musculoskeletal:  clubbing no,cyanosis- no , digital ischemia- no                           muscle strength- grossly normal , tone - grossly normal  Skin: rashes- no , ulcers- no, induration- no, tightening - no  Psychiatric:  Judgement and insight- normal           AAO X 3       LABS:     Recent Labs     12/29/19  1859 12/30/19  0611   WBC 7.0

## 2020-01-02 ENCOUNTER — APPOINTMENT (OUTPATIENT)
Dept: ULTRASOUND IMAGING | Age: 70
DRG: 243 | End: 2020-01-02
Payer: MEDICARE

## 2020-01-02 LAB
LV EF: 58 %
LVEF MODALITY: NORMAL
SPE/IFE INTERPRETATION: NORMAL
URINE ELECTROPHORESIS INTERP: NORMAL

## 2020-01-02 PROCEDURE — 99232 SBSQ HOSP IP/OBS MODERATE 35: CPT | Performed by: NURSE PRACTITIONER

## 2020-01-02 PROCEDURE — 6360000002 HC RX W HCPCS: Performed by: INTERNAL MEDICINE

## 2020-01-02 PROCEDURE — 99221 1ST HOSP IP/OBS SF/LOW 40: CPT | Performed by: ORTHOPAEDIC SURGERY

## 2020-01-02 PROCEDURE — 1200000000 HC SEMI PRIVATE

## 2020-01-02 PROCEDURE — 2580000003 HC RX 258: Performed by: INTERNAL MEDICINE

## 2020-01-02 PROCEDURE — 76770 US EXAM ABDO BACK WALL COMP: CPT

## 2020-01-02 PROCEDURE — 6370000000 HC RX 637 (ALT 250 FOR IP): Performed by: INTERNAL MEDICINE

## 2020-01-02 PROCEDURE — 93306 TTE W/DOPPLER COMPLETE: CPT

## 2020-01-02 RX ADMIN — ACETAMINOPHEN 650 MG: 325 TABLET ORAL at 10:23

## 2020-01-02 RX ADMIN — DIAZEPAM 10 MG: 5 TABLET ORAL at 21:55

## 2020-01-02 RX ADMIN — HEPARIN SODIUM 5000 UNITS: 5000 INJECTION INTRAVENOUS; SUBCUTANEOUS at 05:42

## 2020-01-02 RX ADMIN — Medication 10 ML: at 21:59

## 2020-01-02 RX ADMIN — HEPARIN SODIUM 5000 UNITS: 5000 INJECTION INTRAVENOUS; SUBCUTANEOUS at 21:50

## 2020-01-02 RX ADMIN — TRAMADOL HYDROCHLORIDE 50 MG: 50 TABLET ORAL at 21:55

## 2020-01-02 RX ADMIN — HEPARIN SODIUM 5000 UNITS: 5000 INJECTION INTRAVENOUS; SUBCUTANEOUS at 16:38

## 2020-01-02 RX ADMIN — ACETAMINOPHEN 650 MG: 325 TABLET ORAL at 21:50

## 2020-01-02 ASSESSMENT — PAIN DESCRIPTION - LOCATION
LOCATION: ARM
LOCATION: ELBOW

## 2020-01-02 ASSESSMENT — PAIN SCALES - GENERAL
PAINLEVEL_OUTOF10: 9
PAINLEVEL_OUTOF10: 2
PAINLEVEL_OUTOF10: 8

## 2020-01-02 ASSESSMENT — PAIN DESCRIPTION - FREQUENCY
FREQUENCY: CONTINUOUS
FREQUENCY: CONTINUOUS

## 2020-01-02 ASSESSMENT — PAIN - FUNCTIONAL ASSESSMENT: PAIN_FUNCTIONAL_ASSESSMENT: PREVENTS OR INTERFERES SOME ACTIVE ACTIVITIES AND ADLS

## 2020-01-02 ASSESSMENT — PAIN DESCRIPTION - ONSET
ONSET: ON-GOING
ONSET: ON-GOING

## 2020-01-02 ASSESSMENT — PAIN DESCRIPTION - ORIENTATION
ORIENTATION: RIGHT
ORIENTATION: RIGHT

## 2020-01-02 ASSESSMENT — PAIN DESCRIPTION - PAIN TYPE
TYPE: ACUTE PAIN
TYPE: ACUTE PAIN

## 2020-01-02 ASSESSMENT — PAIN DESCRIPTION - PROGRESSION
CLINICAL_PROGRESSION: NOT CHANGED
CLINICAL_PROGRESSION: NOT CHANGED
CLINICAL_PROGRESSION: GRADUALLY WORSENING

## 2020-01-02 ASSESSMENT — PAIN DESCRIPTION - DESCRIPTORS
DESCRIPTORS: THROBBING
DESCRIPTORS: ACHING;DISCOMFORT

## 2020-01-02 NOTE — PROGRESS NOTES
of consciousness during the episode  High risk for progression to complete heart block  Recommended dual-chamber pacemaker implantation  Patient is not interested in pacemaker implantation; he feels that his heart is very good and he do not feel that he needs pacemaker; discussed about the consequences of paroxysmal AV block which includes recurrent syncope. Patient once again denied any loss of consciousness and insisted that his legs gave way.   Continue to avoid beta-blockers/calcium channel blockers  Recommend outpatient monitoring    Pj Saucedo MD   [address] [place] [address]  Office [phone]  [facility]

## 2020-01-02 NOTE — PLAN OF CARE
Problem: Falls - Risk of:  Goal: Will remain free from falls  Description  Will remain free from falls  1/2/2020 0030 by Harrison Escamilla RN  Outcome: Ongoing  Note:   Patient will remain free from falls. Patient up x1 with cane and tolerates well. Patient currently in bed resting. Fall precautions in place. Bed locked and in lowest position. Bed alarm on, nonskid socks on. Belonging and call light within reach. Problem: Pain:  Goal: Pain level will decrease  Description  Pain level will decrease  1/2/2020 0030 by Harrison Escamilla RN  Outcome: Ongoing  Note:   Patient still complaining of pain to his right upper arm and shoulder. Rating pain 10/10 at times. Medicated per MAR orders. Patient currently resting in bed. Will continue to assess and monitor.

## 2020-01-02 NOTE — PROGRESS NOTES
[facility]    [facility]              [phone]                       Plan :       Creatinine is better at 2.0  Bicarbonate improved and will discontinue bicarbonate tablets  He has no significant proteinuria  OK to DC and will follow up in 2-3 weeks in the office     Assessment :     Stage III chronic kidney disease since 2010: His creatinine was 1.5 in June 2010 and has gradually worsened to 2.3 as of 2019. Renal ultrasound is pending  Serum for free light chains with SPEP and UPEP  Repeat renal panel in the morning    Acute kidney injury: He arrived with a creatinine of 3.3 from a baseline of 2.3    At home he was taking Hyzaar. He did not receive IV contrast  He has no documented significant hypotension    By history it appears prerenal and improved with IV fluid. Hyperlipidemia: Cholesterol is 138 with an LDL of 33    He has mixed anion gap and non-anion gap metabolic acidosis: It is likely from acute on chronic kidney disease and use of normal saline which is causing hyperchloremic metabolic acidosis. Stop IVF  Start oral Bicarbonate  He is reluctant to start a new line  Creatinine near baseline    [facility] would like to thank Tiff Grimes MD   for opportunity to serve this patient      Please call with questions at-   24 Hrs Answering service [phone] or  7 am- 5 pm via Perfect serve or cell phone  Quin Monge          CC/reason for consult :     Acute kidney injury and acidosis     HPI :     Kristie Gibbs. is a 78 y.o. male presented to   the hospital on 12/29/2019 with recurrent syncope    He has past medical history of hypertension, low back pain and neck fracture he presented after syncopal episode. Will describe that his legs gave way and he went down on his knees. He did not lose consciousness. He denies chest pain, palpitations, cough. He does have postnasal drip and has nausea vomiting for last 3 to 4 days.   As per the patient 3       LABS:     No results for input(s): WBC, HGB, HCT, PLT in the last 72 hours.   Recent Labs     12/31/19  0703 01/01/20  0614    142   K 5.1 3.8    103   CO2 18* 26   BUN 43* 40*   CREATININE 2.2* 2.0*   GLUCOSE 109* 107*   PHOS 2.9 2.9      Nephrology  [address]  Office: [phone]  Cell: [phone]  Fax: [phone]

## 2020-01-02 NOTE — DISCHARGE INSTR - OTHER ORDERS
R arm wounds/blisters: Every 3 days while still draining, cleanse with soap and water, pat dry, cover  With Xeroform piece, ABD and wrap with kerlix, elastic sleeve over as provided.  Call doctor if reddens, hot to touch, increased pain to area.          Collection Information     Verbal Order Info     Action Created on Order Mode Entered by Responsible Provider Signed by Signed on   Ordering 01/02/20 1317 Telephone with TAMMY Paiz MD
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
## 2020-01-02 NOTE — CARE COORDINATION
Case Management Assessment           Daily Note                 Date/ Time of Note: 1/2/2020 4:09 PM         Note completed by: Ramona Christianson    Patient Name: Tanya Angelucci. YOB: 1950    Diagnosis:Acute renal failure (ARF) ([address] [N17.9]  Patient Admission Status: Inpatient    Date of Admission:12/29/2019  5:12 PM Length of Stay: 4 GLOS:        Current Plan of Care: return to home at d/c  ________________________________________________________________________________________  PT AM-PAC: 18 / 24 per last evaluation on: 01/01/2019    OT AM-PAC: 19 / 24 per last evaluation on: 01/01/2019    DME Needs for discharge: NA    ________________________________________________________________________________________  Discharge Plan: Home with [address]: [facility]    Tentative discharge date: TBD      Current barriers to discharge: going to have pacemaker placed tomorrow    Referrals completed: [address]: [facility]    Resources/ information provided: Not indicated at this time  ________________________________________________________________________________________  Case Management Notes:Spoke with patient and he has a dressing to his right arm and would like home care for this. He agreed to [facility]. Andriy Fabian and his family were provided with choice of provider; he and his family are in agreement with the discharge plan.     Care Transition Patient: Ally Christianson RN  [facility]  Case Management Department  Ph: [phone]  Fax: [phone]

## 2020-01-02 NOTE — PROGRESS NOTES
HOSPITAL MEDICINE  - PROGRESS NOTE    Admit Date: 12/29/2019         Interval History: *78 y.o. male who presents after? Syncopal episode patient states that his legs gave away and he went down on his knees and unclear he lost consciousness.   -has been having a sinus infection with postnasal drip and was vomiting and  having diarrhea over the past 3 to 4 days  - has passed out 2-3 times over the past week. No  chest pain, shortness of breath, palpitations but has been feeling lightheaded prior during these episodes.     Sub  Several episodes of sinus pauses overnight  No further fevers    Objective: afebrile    Diet: DIET GENERAL;  Dietary Nutrition Supplements: Standard High Calorie Oral Supplement      Data:   Scheduled Meds: Reviewed  Continuous Infusions:      Intake/Output Summary (Last 24 hours) at 1/2/2020 1327  Last data filed at 1/2/2020 0815  Gross per 24 hour   Intake 480 ml   Output 550 ml   Net -70 ml     CBC:   No results for input(s): WBC, HGB, PLT in the last 72 hours. BMP:  Recent Labs     01/01/20  0614      K 3.8      CO2 26   BUN 40*   CREATININE 2.0*   GLUCOSE 107*         Objective:   Vitals: /68   Pulse 84   Temp 98 °F (36.7 °C) (Oral)   Resp 16   Ht 5' 11\" (1.803 m)   Wt 158 lb 1.1 oz (71.7 kg)   SpO2 96%   BMI 22.05 kg/m²   General appearance: alert, appears stated age and cooperative  Skin: Skin color, texture, turgor normal.   HEENT: Head: Normocephalic, no lesions, without obvious abnormality.   Neck: supple  Lungs: clear to auscultation bilaterally  Heart: regular rate and rhythm, S1, S2 normal, no murmur, click, rub or gallop  Abdomen: soft, non-tender; bowel sounds normal; no masses,  no organomegaly  Extremities: - blister upper rt arm with mod swelling and decr ROM,rt shoulder  Neurologic: Mental status: Alert, oriented, thought content appropriate      Assessment & Plan:        Syncope and collapse-recurrent, likely secondary to   Heart block.   CT head and CT cervical spine  Neg  intermittent asymptomatic AV block on tele.      AV block-consistent with paroxysmal AV block  High risk for progression to complete heart block  Recommended dual-chamber pacemaker implantation  Patient is not interested in pacemaker implantation; I spoke to sister today and she insists pt must have Pacemaker-cancel discharge for further cardiology discussion. Continue to avoid beta-blockers/calcium channel blockers      Recent gastroenteritis-resolved  -supportive care     SURY on CKD stage 3-baseline Cr 1.8-2.3  -He arrived with a creatinine of 3.3-Stage III chronic kidney disease since 2010: His creatinine was 1.5 in June 2010 and has gradually worsened to 2.3 as of 2019.  -resolving with fluids. Holding Hyzaar for now     RUE swelling. Rt shoulder pain  -CT scan shoulder with avasc necrosis-no surgery per Ortho-outpt follow up  -ct pain control     Right toe pain following syncope, imaging studies with soft tissue swelling but no open wounds or fractures  -pain control     Essential hypertension  -ct home meds-hold ARB     Tobacco use  Nicotine patch ,Smoking cessation counseling              DVT Prophylaxis: Lovenox  Diet: DIET GENERAL;  Dietary Nutrition Supplements: Standard High Calorie Oral Supplement  Code Status: Full Code     PT/OT Eval Status: As tolerated    Tesha Demarco MD

## 2020-01-02 NOTE — PLAN OF CARE
Problem: Falls - Risk of:  Goal: Will remain free from falls  Description  Will remain free from falls  1/2/2020 0030 by Kacie Medina RN  Outcome: Ongoing  Note:   Patient will remain free from falls. Patient up x1 with cane and tolerates well. Patient currently in bed resting. Fall precautions in place. Bed locked and in lowest position. Bed alarm on, nonskid socks on. Belonging and call light within reach. Problem: Pain:  Goal: Pain level will decrease  Description  Pain level will decrease  Outcome: Ongoing  Note:   Patient still complaining of pain to his right upper arm and shoulder. Rating pain 10/10 at times. Medicated per MAR orders. Patient currently resting in bed. Will continue to assess and monitor.

## 2020-01-02 NOTE — DISCHARGE INSTR - COC
Indicated By Review Planned Expiration Resolved Resolved By    C-diff Rule Out  12/29/19 12/29/19 Clostridium difficile toxin/antigen (Ordered)              Nurse Assessment:  Last Vital Signs: /68   Pulse 84   Temp 98 °F (36.7 °C) (Oral)   Resp 16   Ht 5' 11\" (1.803 m)   Wt 158 lb 1.1 oz (71.7 kg)   SpO2 96%   BMI 22.05 kg/m²     Last documented pain score (0-10 scale): Pain Level: 9  Last Weight:   Wt Readings from Last 1 Encounters:   12/30/19 158 lb 1.1 oz (71.7 kg)     Mental Status:  {IP PT MENTAL STATUS:20030}    IV Access:  { TOYA IV ACCESS:080096119}    Nursing Mobility/ADLs:  Walking   {P DME OJVU:221796531}  Transfer  {P DME DKNT:311809954}  Bathing  {P DME ZWVX:812450612}  Dressing  {P DME VOFX:353466691}  Toileting  {P DME RTIJ:979746696}  Feeding  {[facility] DME LCHH:888947674}  Med Admin  {P DME SBHJ:186262105}  Med Delivery   { TOYA MED Delivery:304024079}    Wound Care Documentation and Therapy:        Elimination:  Continence:   · Bowel: {YES / GK:41362}  · Bladder: {YES / RO:68935}  Urinary Catheter: {Urinary Catheter:826685968}   Colostomy/Ileostomy/Ileal Conduit: {YES / EV:70211}       Date of Last BM: ***    Intake/Output Summary (Last 24 hours) at 1/2/2020 1318  Last data filed at 1/2/2020 0815  Gross per 24 hour   Intake 480 ml   Output 550 ml   Net -70 ml     I/O last 3 completed shifts:   In: 5 [P.O.:720]  Out: 475 [Urine:475]    Safety Concerns:     [address] [employer] Safety Concerns:133658117}    Impairments/Disabilities:      508 [employer] Impairments/Disabilities:712752903}    Nutrition Therapy:  Current Nutrition Therapy:   [address] [employer] Diet List:897421003}    Routes of Feeding: {[facility] DME Other Feedings:361857433}  Liquids: {Slp liquid thickness:92524}  Daily Fluid Restriction: {CHP DME Yes amt example:763091437}  Last Modified Barium Swallow with Video (Video Swallowing Test): {Done Not Done WPPE:163289421}    Treatments at the Time of Hospital Discharge:   Respiratory Treatments: ***  Oxygen Therapy:  {Therapy; copd oxygen:58318}  Ventilator:    {MH CC Vent ELZT:204701078}    Rehab Therapies: {THERAPEUTIC INTERVENTION:2782618502}  Weight Bearing Status/Restrictions: 50Aamir BERRIOS Weight Bearin}  Other Medical Equipment (for information only, NOT a DME order):  {EQUIPMENT:156523383}  Other Treatments: ***    Patient's personal belongings (please select all that are sent with patient):  {CHP DME Belongings:371633886}    RN SIGNATURE:  {Esignature:721125667}    CASE MANAGEMENT/SOCIAL WORK SECTION    Inpatient Status Date: ***    Readmission Risk Assessment Score:  Readmission Risk              Risk of Unplanned Readmission:        12           Discharging to Facility/ Agency   · Name:   · Address:  · Phone:  · Fax:    Dialysis Facility (if applicable)   · Name:  · Address:  · Dialysis Schedule:  · Phone:  · Fax:    / signature: {Esignature:994636431}    PHYSICIAN SECTION    Prognosis: {Prognosis:3358038103}    Condition at Discharge: 50Aamir Bond Patient Condition:559670432}    Rehab Potential (if transferring to Rehab): {Prognosis:7396937598}    Recommended Labs or Other Treatments After Discharge: ***    Physician Certification: I certify the above information and transfer of Dayo Garcia.  is necessary for the continuing treatment of the diagnosis listed and that he requires {Admit to Appropriate Level of Care:46499} for {GREATER/LESS:421114981} 30 days.      Update Admission H&P: {CHP DME Changes in KOFKC:331294976}    PHYSICIAN SIGNATURE:  {Esignature:961305186}

## 2020-01-03 ENCOUNTER — APPOINTMENT (OUTPATIENT)
Dept: CARDIAC CATH/INVASIVE PROCEDURES | Age: 70
DRG: 243 | End: 2020-01-03
Payer: MEDICARE

## 2020-01-03 ENCOUNTER — NURSE ONLY (OUTPATIENT)
Dept: CARDIOLOGY CLINIC | Age: 70
End: 2020-01-03

## 2020-01-03 PROBLEM — Z95.0 PACEMAKER: Status: ACTIVE | Noted: 2020-01-03

## 2020-01-03 LAB
ALBUMIN SERPL-MCNC: 3.2 G/DL (ref 3.4–5)
ANION GAP SERPL CALCULATED.3IONS-SCNC: 16 MMOL/L (ref 3–16)
BUN BLDV-MCNC: 21 MG/DL (ref 7–20)
CALCIUM SERPL-MCNC: 9 MG/DL (ref 8.3–10.6)
CHLORIDE BLD-SCNC: 100 MMOL/L (ref 99–110)
CO2: 22 MMOL/L (ref 21–32)
CREAT SERPL-MCNC: 1.6 MG/DL (ref 0.8–1.3)
GFR AFRICAN AMERICAN: 52
GFR NON-AFRICAN AMERICAN: 43
GLUCOSE BLD-MCNC: 129 MG/DL (ref 70–99)
PHOSPHORUS: 2.8 MG/DL (ref 2.5–4.9)
POTASSIUM SERPL-SCNC: 4.4 MMOL/L (ref 3.5–5.1)
SODIUM BLD-SCNC: 138 MMOL/L (ref 136–145)

## 2020-01-03 PROCEDURE — 36415 COLL VENOUS BLD VENIPUNCTURE: CPT

## 2020-01-03 PROCEDURE — C1898 LEAD, PMKR, OTHER THAN TRANS: HCPCS

## 2020-01-03 PROCEDURE — 2060000000 HC ICU INTERMEDIATE R&B

## 2020-01-03 PROCEDURE — 99152 MOD SED SAME PHYS/QHP 5/>YRS: CPT

## 2020-01-03 PROCEDURE — 33208 INSRT HEART PM ATRIAL & VENT: CPT

## 2020-01-03 PROCEDURE — 99233 SBSQ HOSP IP/OBS HIGH 50: CPT | Performed by: INTERNAL MEDICINE

## 2020-01-03 PROCEDURE — 6360000002 HC RX W HCPCS: Performed by: INTERNAL MEDICINE

## 2020-01-03 PROCEDURE — C1894 INTRO/SHEATH, NON-LASER: HCPCS

## 2020-01-03 PROCEDURE — C1785 PMKR, DUAL, RATE-RESP: HCPCS

## 2020-01-03 PROCEDURE — 02HK3JZ INSERTION OF PACEMAKER LEAD INTO RIGHT VENTRICLE, PERCUTANEOUS APPROACH: ICD-10-PCS | Performed by: INTERNAL MEDICINE

## 2020-01-03 PROCEDURE — 0JH606Z INSERTION OF PACEMAKER, DUAL CHAMBER INTO CHEST SUBCUTANEOUS TISSUE AND FASCIA, OPEN APPROACH: ICD-10-PCS | Performed by: INTERNAL MEDICINE

## 2020-01-03 PROCEDURE — 80069 RENAL FUNCTION PANEL: CPT

## 2020-01-03 PROCEDURE — B51P1ZZ FLUOROSCOPY OF BILATERAL UPPER EXTREMITY VEINS USING LOW OSMOLAR CONTRAST: ICD-10-PCS | Performed by: INTERNAL MEDICINE

## 2020-01-03 PROCEDURE — 6370000000 HC RX 637 (ALT 250 FOR IP): Performed by: INTERNAL MEDICINE

## 2020-01-03 PROCEDURE — 02H63JZ INSERTION OF PACEMAKER LEAD INTO RIGHT ATRIUM, PERCUTANEOUS APPROACH: ICD-10-PCS | Performed by: INTERNAL MEDICINE

## 2020-01-03 PROCEDURE — 99153 MOD SED SAME PHYS/QHP EA: CPT

## 2020-01-03 PROCEDURE — 2580000003 HC RX 258: Performed by: INTERNAL MEDICINE

## 2020-01-03 RX ORDER — TRAMADOL HYDROCHLORIDE 50 MG/1
100 TABLET ORAL ONCE
Status: COMPLETED | OUTPATIENT
Start: 2020-01-03 | End: 2020-01-03

## 2020-01-03 RX ADMIN — HEPARIN SODIUM 5000 UNITS: 5000 INJECTION INTRAVENOUS; SUBCUTANEOUS at 21:39

## 2020-01-03 RX ADMIN — TRAMADOL HYDROCHLORIDE 50 MG: 50 TABLET ORAL at 05:39

## 2020-01-03 RX ADMIN — HEPARIN SODIUM 5000 UNITS: 5000 INJECTION INTRAVENOUS; SUBCUTANEOUS at 05:38

## 2020-01-03 RX ADMIN — TRAMADOL HYDROCHLORIDE 50 MG: 50 TABLET ORAL at 21:35

## 2020-01-03 RX ADMIN — TRAMADOL HYDROCHLORIDE 100 MG: 50 TABLET, FILM COATED ORAL at 00:50

## 2020-01-03 RX ADMIN — Medication 10 ML: at 22:13

## 2020-01-03 ASSESSMENT — PAIN SCALES - GENERAL
PAINLEVEL_OUTOF10: 8
PAINLEVEL_OUTOF10: 6
PAINLEVEL_OUTOF10: 9
PAINLEVEL_OUTOF10: 8
PAINLEVEL_OUTOF10: 9

## 2020-01-03 ASSESSMENT — PAIN DESCRIPTION - FREQUENCY: FREQUENCY: CONTINUOUS

## 2020-01-03 ASSESSMENT — PAIN DESCRIPTION - DESCRIPTORS: DESCRIPTORS: ACHING

## 2020-01-03 ASSESSMENT — PAIN DESCRIPTION - ONSET: ONSET: ON-GOING

## 2020-01-03 ASSESSMENT — PAIN - FUNCTIONAL ASSESSMENT: PAIN_FUNCTIONAL_ASSESSMENT: ACTIVITIES ARE NOT PREVENTED

## 2020-01-03 ASSESSMENT — PAIN DESCRIPTION - LOCATION: LOCATION: GENERALIZED

## 2020-01-03 ASSESSMENT — PAIN DESCRIPTION - PROGRESSION
CLINICAL_PROGRESSION: GRADUALLY WORSENING
CLINICAL_PROGRESSION: NOT CHANGED
CLINICAL_PROGRESSION: NOT CHANGED
CLINICAL_PROGRESSION: GRADUALLY WORSENING

## 2020-01-03 ASSESSMENT — PAIN DESCRIPTION - PAIN TYPE: TYPE: ACUTE PAIN

## 2020-01-03 NOTE — PROGRESS NOTES
· Mouth/Throat: Lips appear moist. Oropharynx is clear and moist.  · Eyes: Conjunctivae normal. EOM are normal.   · Neck: Neck supple. No lymphadenopathy. No rigidity. No JVD present. · Cardiovascular: Normal rate, regular rhythm. Normal S1&S2. Carotid pulse 2+ bilaterally. · Pulmonary/Chest: Bilateral respiratory sounds present. No respiratory accessory muscle use. No wheezes, No rhonchi. · Abdominal: Soft. Normal bowel sounds present. No distension, No tenderness. No splenomegaly. No hernia. · Musculoskeletal: No tenderness. No edema    · Lymphadenopathy: Has no cervical adenopathy. · Neurological: Alert and oriented. Cranial nerve II-XII grossly intact, No gross deficit to touch. · Skin: Skin is warm and dry. No rash, lesions, ulcerations noted. · Psychiatric: No anxiety nor agitation. Labs, diagnostic and imaging results reviewed. Reviewed. Recent Labs     01/01/20  0614      K 3.8      CO2 26   PHOS 2.9   BUN 40*   CREATININE 2.0*     No results for input(s): WBC, HGB, HCT, MCV, PLT in the last 72 hours. Lab Results   Component Value Date    CKTOTAL 77 10/20/2013    TROPONINI <0.01 12/29/2019     Estimated Creatinine Clearance: 35 mL/min (A) (based on SCr of 2 mg/dL (H)).    No results found for: BNP  No results found for: PROTIME, INR  Lab Results   Component Value Date    CHOL 131 04/16/2019    HDL 32 04/16/2019    HDL 33 08/25/2011    TRIG 497 04/16/2019       Scheduled Meds:   polyethylene glycol  17 g Oral Daily    acetaminophen  650 mg Oral Once    sodium chloride flush  10 mL Intravenous 2 times per day    heparin (porcine)  5,000 Units Subcutaneous 3 times per day    nicotine  1 patch Transdermal Daily     Continuous Infusions:  PRN Meds:traMADol, ondansetron, diazepam, sodium chloride flush, magnesium hydroxide, ondansetron, acetaminophen     Patient Active Problem List    Diagnosis Date Noted    Acute renal failure (ARF) ([facility] [address] 12/29/2019    DDD (degenerative disc disease), lumbosacral 10/10/2013    Elevated LFTs 10/16/2012    Neck pain 07/16/2012    Hand pain, right 07/16/2012    Insomnia 07/16/2012    Neuropathy 07/16/2012    Hypertension 05/18/2010    Low back pain 05/18/2010      Active Hospital Problems    Diagnosis Date Noted    [facility] [N17.9[facility] 12/29/2019       Assessment and Plan:     Complete heart block  Multiple falls  Acute kidney injury      The risks and benefits of a PPM implantation were discussed at length with the patient, Much time was spent with the patient in a shared-decision making approach, discussing the pathophysiology of the patient's diagnosis of complete heart block, the risk of presyncope or syncope, the utility of a PPM, the benefit and risks of the implantation of a PPM, the benefits and risks of living with a PPM, the lifestyle changes that come along with having a PPM, and the logistics of a PPM care and generator changes that go along with having a PPM.    The risks including, but not limited to, the risks of vascular injury, bleeding, infection, device malfunction, lead dislodgement, radiation exposure, injury to cardiac and surrounding structures (including pneumothorax), stroke, myocardial infarction and death were discussed in detail. We will proceed with dual-chamber pacemaker implantation, under conscious sedation    Thank you for allowing me to participate in the care of this patient. If you have any questions, please do not hesitate to contact me.     Yohannes Kohler MD   Cardiac Electrophysiology  [facility]

## 2020-01-03 NOTE — PLAN OF CARE
Problem: Falls - Risk of:  Goal: Will remain free from falls  Description  Will remain free from falls  Outcome: Ongoing  Note:   Pt with absence of falls this shift. Pt has nonskid socks in place and bed alarm is on. Pt demonstrates safety awareness and calls to RN appropriately. Will continue to monitor. Problem: Pain:  Goal: Pain level will decrease  Description  Pain level will decrease  Outcome: Ongoing  Note:   Pt complains of pain rating 9/10 this shift r/t R arm wounds. RN administers PRN pain medication per orders. Upon reassessment, pt resting with RR>10. Will continue to monitor.

## 2020-01-03 NOTE — PROCEDURES
patient was prepped and draped in sterile fashion. A timeout protocol was completed to identify the patient and the procedure being performed. IV sedation was provided with IV Versed and IV Fentanyl. The patient was monitored continuously with ECG, pulse oximetry, blood pressure monitoring, and direct observation. An incision was made in the left upper pectoral area in a transverse line roughly 1 cm from the clavicle after administration of lidocaine/bupivicaine combination. Using electrocautery and blunt dissection, a pocket was created. Central venous access into the left axillary vein was obtained using the modified Seldinger technique. After central venous access was obtained, a sheath was placed in the axillary vein. A right ventricular lead was advanced into the RV septal position under fluoroscopic guidance and using a series of curved and straight stylets. The lead was actively fixated. After confirming appropriate function and no diaphragmatic stimulation at maximum output, the sheath was split and removed. The lead was secured to the underlying tissue using suture material.      A new sheath was advanced over a second previously placed wire into the vein. The atrial lead was advanced to the right atrial appendage and actively fixated under fluoroscopic guidance. After confirming appropriate function and no diaphragmatic stimulation at maximum output, the sheath was split and removed. The lead was secured to the underlying tissue using suture. The leads were then connected to the new pulse generator which was then placed into the pocket. Antibiotic solution along with saline flush was used to irrigate the pocket. The pocket was then closed using a 2-0, 3-0 Vicryl subcutaneous layer and a subcuticular layer using 4-0 Vicryl. The skin was covered with Steri-Strips and pressure dressing. All sponge and needle counts were reported as correct at the end of the procedure.      The patient tolerated

## 2020-01-03 NOTE — PLAN OF CARE
Problem: Falls - Risk of:  Goal: Will remain free from falls  Description  Will remain free from falls  1/3/2020 0301 by Ronan Ken RN  Outcome: Ongoing  Note:   Pt with absence of falls this shift. Pt has nonskid socks in place and bed alarm is on. Pt demonstrates safety awareness and calls to RN appropriately. Will continue to monitor. Problem: Pain:  Goal: Pain level will decrease  Description  Pain level will decrease  1/3/2020 0821 by Marcy Mac RN  Note:   Ultram for right arm pain, dressing to elbow clean dry ands intact , brisk cap refill fingers 2 plus radial pulse   1/3/2020 0301 by Ronan Ken RN  Outcome: Ongoing  Note:   Pt complains of pain rating 9/10 this shift r/t R arm wounds. RN administers PRN pain medication per orders. Upon reassessment, pt resting with RR>10. Will continue to monitor.

## 2020-01-03 NOTE — PROGRESS NOTES
5 second pause noted on telemetry. VSS. Pt was sleeping at the time. On call hospitalist notified. Will continue to monitor.

## 2020-01-03 NOTE — PROGRESS NOTES
Occupational Therapy  Gadiel Voss. Pt on hold this am, scheduled for surgery 1pm, per RN. Continue with POC.   Ramon Smith, [address]

## 2020-01-03 NOTE — PROGRESS NOTES
Physical Therapy    Attempted to see pt for PT. Pt off floor.   Will cont per [id number] [place], PT 0128

## 2020-01-03 NOTE — PLAN OF CARE
Problem: Falls - Risk of:  Goal: Will remain free from falls  Description  Will remain free from falls  1/3/2020 0301 by Lopez Hernandez RN  Outcome: Ongoing  Note:   Pt with absence of falls this shift. Pt has nonskid socks in place and bed alarm is on. Pt demonstrates safety awareness and calls to RN appropriately. Will continue to monitor. Problem: Pain:  Goal: Pain level will decrease  Description  Pain level will decrease  1/3/2020 0821 by Bassem Matthews RN  Note:   Ultram for right arm pain, dressing to elbow clean dry ands intact , brisk cap refill fingers 2 plus radial pulse   1/3/2020 0301 by Lopez Hernandez RN  Outcome: Ongoing  Note:   Pt complains of pain rating 9/10 this shift r/t R arm wounds. RN administers PRN pain medication per orders. Upon reassessment, pt resting with RR>10. Will continue to monitor.
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
## 2020-01-03 NOTE — PRE SEDATION
Sedation Pre-Procedure Note    Patient Name: Yvette Mike YOB: 1950  Room/Bed: 5521/5521-01  Medical Record Number: [id number]  Date: 1/3/2020   Time: 12:11 PM       Indication: Complete heart block    Consent: I have discussed with the patient and/or the patient representative the indication, alternatives, and the possible risks and/or complications of the planned procedure and the anesthesia methods. The patient and/or patient representative appear to understand and agree to proceed. Vital Signs:   Vitals:    01/03/20 1101   BP: 129/80   Pulse: 81   Resp: 18   Temp: 98.5 °F (36.9 °C)   SpO2: 96%       Past Medical History:   has a past medical history of Hypertension, Low back pain, and Neck fracture ([address] Past Surgical History:   has a past surgical history that includes Total hip arthroplasty; hip surgery; Neck surgery; and fracture surgery. Medications:   Scheduled Meds:    polyethylene glycol  17 g Oral Daily    acetaminophen  650 mg Oral Once    sodium chloride flush  10 mL Intravenous 2 times per day    heparin (porcine)  5,000 Units Subcutaneous 3 times per day    nicotine  1 patch Transdermal Daily     Continuous Infusions:   PRN Meds: traMADol, ondansetron, diazepam, sodium chloride flush, magnesium hydroxide, ondansetron, acetaminophen  Home Meds:   Prior to Admission medications    Medication Sig Start Date End Date Taking? Authorizing Provider   diazepam (VALIUM) 10 MG tablet TAKE 1 TABLET BY MOUTH EVERY TWELVE HOURS AS NEEDED FOR ANXIETY FOR UP TO 30 DAYS 8/29/19 8/29/20 Yes C Yashira Shea MD   oxyCODONE-acetaminophen (PERCOCET) 5-325 MG per tablet Take 1 tablet by mouth 3 times daily as needed for Pain .    Yes Historical Provider, MD   losartan-hydrochlorothiazide (HYZAAR) 100-25 MG per tablet TAKE 1 TABLET BY MOUTH EVERY DAY 8/29/19   MADELYN Shea MD   sildenafil (VIAGRA) 100 MG tablet Take 1 tablet by mouth as needed for Erectile Dysfunction 4/16/19   C Aileen House

## 2020-01-04 ENCOUNTER — APPOINTMENT (OUTPATIENT)
Dept: GENERAL RADIOLOGY | Age: 70
DRG: 243 | End: 2020-01-04
Payer: MEDICARE

## 2020-01-04 LAB
HCT VFR BLD CALC: 34.5 % (ref 40.5–52.5)
HEMOGLOBIN: 11.7 G/DL (ref 13.5–17.5)
MCH RBC QN AUTO: 32.6 PG (ref 26–34)
MCHC RBC AUTO-ENTMCNC: 33.8 G/DL (ref 31–36)
MCV RBC AUTO: 96.3 FL (ref 80–100)
PDW BLD-RTO: 12.3 % (ref 12.4–15.4)
PLATELET # BLD: 158 K/UL (ref 135–450)
PMV BLD AUTO: 10.7 FL (ref 5–10.5)
RBC # BLD: 3.58 M/UL (ref 4.2–5.9)
WBC # BLD: 6.7 K/UL (ref 4–11)

## 2020-01-04 PROCEDURE — 2580000003 HC RX 258: Performed by: INTERNAL MEDICINE

## 2020-01-04 PROCEDURE — 6370000000 HC RX 637 (ALT 250 FOR IP): Performed by: NURSE PRACTITIONER

## 2020-01-04 PROCEDURE — 36415 COLL VENOUS BLD VENIPUNCTURE: CPT

## 2020-01-04 PROCEDURE — 6370000000 HC RX 637 (ALT 250 FOR IP): Performed by: INTERNAL MEDICINE

## 2020-01-04 PROCEDURE — 2060000000 HC ICU INTERMEDIATE R&B

## 2020-01-04 PROCEDURE — 85027 COMPLETE CBC AUTOMATED: CPT

## 2020-01-04 PROCEDURE — 6360000002 HC RX W HCPCS: Performed by: INTERNAL MEDICINE

## 2020-01-04 PROCEDURE — 71046 X-RAY EXAM CHEST 2 VIEWS: CPT

## 2020-01-04 PROCEDURE — 99232 SBSQ HOSP IP/OBS MODERATE 35: CPT | Performed by: NURSE PRACTITIONER

## 2020-01-04 RX ORDER — OXYCODONE HYDROCHLORIDE AND ACETAMINOPHEN 5; 325 MG/1; MG/1
2 TABLET ORAL EVERY 6 HOURS PRN
Status: DISCONTINUED | OUTPATIENT
Start: 2020-01-04 | End: 2020-01-08 | Stop reason: HOSPADM

## 2020-01-04 RX ORDER — TRAMADOL HYDROCHLORIDE 50 MG/1
50 TABLET ORAL ONCE
Status: COMPLETED | OUTPATIENT
Start: 2020-01-04 | End: 2020-01-04

## 2020-01-04 RX ORDER — ACETAMINOPHEN 325 MG/1
650 TABLET ORAL EVERY 4 HOURS PRN
Status: DISCONTINUED | OUTPATIENT
Start: 2020-01-04 | End: 2020-01-08 | Stop reason: HOSPADM

## 2020-01-04 RX ORDER — SODIUM CHLORIDE 0.9 % (FLUSH) 0.9 %
10 SYRINGE (ML) INJECTION EVERY 12 HOURS SCHEDULED
Status: DISCONTINUED | OUTPATIENT
Start: 2020-01-04 | End: 2020-01-05 | Stop reason: SDUPTHER

## 2020-01-04 RX ORDER — SODIUM CHLORIDE 0.9 % (FLUSH) 0.9 %
10 SYRINGE (ML) INJECTION PRN
Status: DISCONTINUED | OUTPATIENT
Start: 2020-01-04 | End: 2020-01-05 | Stop reason: SDUPTHER

## 2020-01-04 RX ADMIN — OXYCODONE HYDROCHLORIDE AND ACETAMINOPHEN 2 TABLET: 5; 325 TABLET ORAL at 23:17

## 2020-01-04 RX ADMIN — TRAMADOL HYDROCHLORIDE 50 MG: 50 TABLET ORAL at 06:27

## 2020-01-04 RX ADMIN — HEPARIN SODIUM 5000 UNITS: 5000 INJECTION INTRAVENOUS; SUBCUTANEOUS at 06:27

## 2020-01-04 RX ADMIN — Medication 10 ML: at 08:10

## 2020-01-04 RX ADMIN — POLYETHYLENE GLYCOL 3350 17 G: 17 POWDER, FOR SOLUTION ORAL at 08:10

## 2020-01-04 RX ADMIN — TRAMADOL HYDROCHLORIDE 50 MG: 50 TABLET, FILM COATED ORAL at 02:04

## 2020-01-04 RX ADMIN — OXYCODONE HYDROCHLORIDE AND ACETAMINOPHEN 2 TABLET: 5; 325 TABLET ORAL at 09:20

## 2020-01-04 RX ADMIN — HEPARIN SODIUM 5000 UNITS: 5000 INJECTION INTRAVENOUS; SUBCUTANEOUS at 14:29

## 2020-01-04 RX ADMIN — DEXTROSE MONOHYDRATE 2 G: 50 INJECTION, SOLUTION INTRAVENOUS at 15:51

## 2020-01-04 RX ADMIN — OXYCODONE HYDROCHLORIDE AND ACETAMINOPHEN 2 TABLET: 5; 325 TABLET ORAL at 15:51

## 2020-01-04 RX ADMIN — HEPARIN SODIUM 5000 UNITS: 5000 INJECTION INTRAVENOUS; SUBCUTANEOUS at 20:56

## 2020-01-04 RX ADMIN — ACETAMINOPHEN 650 MG: 325 TABLET ORAL at 08:14

## 2020-01-04 RX ADMIN — Medication 10 ML: at 20:56

## 2020-01-04 RX ADMIN — DEXTROSE MONOHYDRATE 2 G: 50 INJECTION, SOLUTION INTRAVENOUS at 08:16

## 2020-01-04 RX ADMIN — TRAMADOL HYDROCHLORIDE 50 MG: 50 TABLET ORAL at 21:00

## 2020-01-04 ASSESSMENT — PAIN DESCRIPTION - LOCATION
LOCATION: SHOULDER
LOCATION: SHOULDER;ARM
LOCATION: GENERALIZED

## 2020-01-04 ASSESSMENT — PAIN SCALES - GENERAL
PAINLEVEL_OUTOF10: 8
PAINLEVEL_OUTOF10: 8
PAINLEVEL_OUTOF10: 9
PAINLEVEL_OUTOF10: 10
PAINLEVEL_OUTOF10: 8
PAINLEVEL_OUTOF10: 6
PAINLEVEL_OUTOF10: 9
PAINLEVEL_OUTOF10: 6
PAINLEVEL_OUTOF10: 8
PAINLEVEL_OUTOF10: 7
PAINLEVEL_OUTOF10: 8
PAINLEVEL_OUTOF10: 4
PAINLEVEL_OUTOF10: 0
PAINLEVEL_OUTOF10: 6

## 2020-01-04 ASSESSMENT — PAIN DESCRIPTION - PROGRESSION
CLINICAL_PROGRESSION: NOT CHANGED
CLINICAL_PROGRESSION: GRADUALLY IMPROVING
CLINICAL_PROGRESSION: NOT CHANGED
CLINICAL_PROGRESSION: GRADUALLY IMPROVING
CLINICAL_PROGRESSION: GRADUALLY WORSENING

## 2020-01-04 ASSESSMENT — PAIN - FUNCTIONAL ASSESSMENT
PAIN_FUNCTIONAL_ASSESSMENT: ACTIVITIES ARE NOT PREVENTED
PAIN_FUNCTIONAL_ASSESSMENT: PREVENTS OR INTERFERES SOME ACTIVE ACTIVITIES AND ADLS

## 2020-01-04 ASSESSMENT — PAIN DESCRIPTION - FREQUENCY
FREQUENCY: CONTINUOUS
FREQUENCY: CONTINUOUS

## 2020-01-04 ASSESSMENT — PAIN DESCRIPTION - PAIN TYPE
TYPE: ACUTE PAIN

## 2020-01-04 ASSESSMENT — PAIN DESCRIPTION - ORIENTATION
ORIENTATION: LEFT
ORIENTATION: LEFT

## 2020-01-04 ASSESSMENT — PAIN DESCRIPTION - DESCRIPTORS: DESCRIPTORS: ACHING

## 2020-01-04 ASSESSMENT — PAIN DESCRIPTION - ONSET
ONSET: ON-GOING
ONSET: ON-GOING

## 2020-01-04 NOTE — PROGRESS NOTES
AM assessment charted. BP (!) 151/61   Pulse 88   Temp 100.5 °F (38.1 °C) (Oral)   Resp 18   Ht 5' 11\" (1.803 m)   Wt 158 lb 1.1 oz (71.7 kg)   SpO2 93%   BMI 22.05 kg/m²   Pt c.o 9/10 pain. Medicated and repositioned. Low grade fever, medicated and MD aware.

## 2020-01-04 NOTE — PROGRESS NOTES
HOSPITAL MEDICINE  - PROGRESS NOTE    Admit Date: 12/29/2019       Chief complaints syncope      Interval History: *78 y.o. male who presents after? Syncopal episode patient states that his legs gave away and he went down on his knees and unclear he lost consciousness.   -has been having a sinus infection with postnasal drip and was vomiting and  having diarrhea over the past 3 to 4 days  - has passed out 2-3 times over the past week. No  chest pain, shortness of breath, palpitations but has been feeling lightheaded prior during these episodes.     Patient had pacemaker inserted  Last night had a low-grade temperature of 100.5  Denies any shortness of breath  Pain at the pacemaker implantation site  No cough  No diarrhea      Diet: DIET CARDIAC;      Data:   Scheduled Meds: Reviewed  Continuous Infusions:      Intake/Output Summary (Last 24 hours) at 1/4/2020 1342  Last data filed at 1/4/2020 5919  Gross per 24 hour   Intake 240 ml   Output 100 ml   Net 140 ml     CBC:   Recent Labs     01/04/20  1107   WBC 6.7   HGB 11.7*        BMP:  Recent Labs     01/03/20  2116      K 4.4      CO2 22   BUN 21*   CREATININE 1.6*   GLUCOSE 129*         Objective:   Vitals: /83   Pulse 93   Temp 99.2 °F (37.3 °C) (Oral)   Resp 17   Ht 5' 11\" (1.803 m)   Wt 158 lb 1.1 oz (71.7 kg)   SpO2 95%   BMI 22.05 kg/m²   General appearance: alert, appears stated age and cooperative  Skin: Skin color, texture, turgor normal.   HEENT: Head: Normocephalic, no lesions, without obvious abnormality.   Neck: supple  Lungs: clear to auscultation bilaterally  Heart: regular rate and rhythm, S1, S2 normal, no murmur, click, rub or gallop  Abdomen: soft, non-tender; bowel sounds normal; no masses,  no organomegaly  Extremities: - blister upper rt arm with mod swelling and decr ROM,rt shoulder  Neurologic: Mental status: Alert, oriented, thought content

## 2020-01-04 NOTE — PROGRESS NOTES
Electrophysiology - PROGRESS NOTE    Admit Date: 12/29/2019     Chief Complaint: Syncope, fall     Interval History:   Patient seen and examined and notes reviewed. Patient is being followed for syncope and frequent falls, noted to intermittent CHB on tele, s/p dual chamber PPM 1/3/19. Patient with c/o pain overnight. Prn pain med not controlling pain - percocet added prn. Low grade temp this am. CXR shows stable lead placement. In: 240 [P.O.:240]  Out: 100    Wt Readings from Last 2 Encounters:   12/30/19 158 lb 1.1 oz (71.7 kg)   04/16/19 160 lb (72.6 kg)         Data:   Scheduled Meds:   Scheduled Meds:   sodium chloride flush  10 mL Intravenous 2 times per day    ceFAZolin (ANCEF) IVPB  2 g Intravenous Q8H    polyethylene glycol  17 g Oral Daily    acetaminophen  650 mg Oral Once    sodium chloride flush  10 mL Intravenous 2 times per day    heparin (porcine)  5,000 Units Subcutaneous 3 times per day    nicotine  1 patch Transdermal Daily     Continuous Infusions:  PRN Meds:.sodium chloride flush, acetaminophen, magnesium hydroxide, traMADol, ondansetron, diazepam, sodium chloride flush, ondansetron  Continuous Infusions:    Intake/Output Summary (Last 24 hours) at 1/4/2020 0822  Last data filed at 1/4/2020 5959  Gross per 24 hour   Intake 240 ml   Output 100 ml   Net 140 ml       CBC:   Lab Results   Component Value Date    WBC 6.0 12/30/2019    HGB 13.2 12/30/2019     12/30/2019     BMP:  Lab Results   Component Value Date     01/03/2020    K 4.4 01/03/2020    K 3.5 12/30/2019     01/03/2020    CO2 22 01/03/2020    BUN 21 01/03/2020    CREATININE 1.6 01/03/2020    GLUCOSE 129 01/03/2020     INR: No results found for: INR     CARDIAC LABS  ENZYMES:No results for input(s): CKMB, CKMBINDEX, TROPONINI in the last 72 hours.     Invalid input(s): CKTOTAL;3  FASTING LIPID PANEL:  Lab Results   Component Value Date    HDL 32 04/16/2019    HDL 33 08/25/2011    LDLDIRECT 33 04/16/2019    LDLCALC see below 04/16/2019    TRIG 497 04/16/2019     LIVER PROFILE:  Lab Results   Component Value Date    AST 50 04/16/2019    AST <5 06/28/2018    ALT 45 04/16/2019    ALT <5 06/28/2018       -----------------------------------------------------------------  Telemetry: Personally reviewed  NSR on tele    Objective:   Vitals: BP (!) 151/61   Pulse 88   Temp 100.5 °F (38.1 °C) (Oral)   Resp 18   Ht 5' 11\" (1.803 m)   Wt 158 lb 1.1 oz (71.7 kg)   SpO2 93%   BMI 22.05 kg/m²   General appearance: alert, appears stated age and cooperative, No acute distress   Eyes: Conjunctiva and pupils normal and reactive  Skin: Skin color, texture, turgor normal. No rashes or ecchymosis. L chest incision CDI  Neck: no JVD, supple, symmetrical, trachea midline   Lungs: , no accessory muscle use, no respiratory distress  Heart: RRR  Abdomen: soft, non-tender; bowel sounds normal  Extremities: No edema, DP +  Psychiatric: normal insight and affect    Patient Active Problem List:     Hypertension     Low back pain     Neck pain     Hand pain, right     Insomnia     Neuropathy     Elevated LFTs     DDD (degenerative disc disease), lumbosacral     Acute renal failure (ARF) (HCC)     Pacemaker        Assessment & Plan:      1. Frequent falls  2. Syncope  3. Intermittent CHB  4. Symptomatic bradycardia    79 y/o man with a h/o HTN, recurrent falls and syncope, p/w a fall, noted to have intermittent CHB on tele, s/p dual chamber MDT PPM (1/3/19, Dr. Elba Demarco).      CHB  - S/p dual chmaber MDT PPM  - Device check shows stable parameters  - L chest incision CDI  - Chest Xray shows stable lead placement  - Wound care and activity restrictions reviewed with patient  - Low grade temp this am - will check CBC  - ATB as ordered        Edgar Dunaway [facility]  [employer]

## 2020-01-04 NOTE — PLAN OF CARE
Problem: Infection:  Goal: Will remain free from infection  Description  Will remain free from infection  Outcome: Ongoing  Note:   Pt has low grade fevers, all other VS stable. See labs. MD aware. No new orders. Will continue to monitor. Problem: Pain:  Goal: Patient's pain/discomfort is manageable  Description  Patient's pain/discomfort is manageable  Outcome: Ongoing  Note:   Pt c/o 9/10 surgical pain. Medicated per Mar and ice applied. Reassessed and pt state he was comfortable and planned to nap. Stated he also didn't feel he could rate his pain that its \"just there\". Will continue to monitor.

## 2020-01-04 NOTE — PLAN OF CARE
Problem: Falls - Risk of:  Goal: Will remain free from falls  Description  Will remain free from falls  Note:   Pt is a Fall Risk. See Prakash Goss Fall Risk Score. Pt bed in low position and side rails up and bed alarm on. Call light and belongings in reach. Pt encouraged to call for assistance. Will continue with hourly rounds for PO intake, pain needs, toileting, and repositioning as needed.

## 2020-01-04 NOTE — PROGRESS NOTES
[facility]    [facility]              [phone]                       Plan :     Cr getting better  Off of iv fluids     Assessment :     Stage III chronic kidney disease since 2010: His creatinine was 1.5 in June 2010 and has gradually worsened to 2.3 as of 2019. Ultrasound shows bilateral 10 cm length. Serum for free light chains with SPEP and UPEP       Acute kidney injury: He arrived with a creatinine of 3.3 from a baseline of 2.3    At home he was taking Hyzaar. He did not receive IV contrast  He has no documented significant hypotension    By history it appears prerenal and improved with IV fluid. Hyperlipidemia: Cholesterol is 138 with an LDL of 33    He has mixed anion gap and non-anion gap metabolic acidosis: It is likely from acute on chronic kidney disease and use of normal saline which is causing hyperchloremic metabolic acidosis. Echocardiogram showed EF of 55 to 60%. He has mild LVH. [facility] would like to thank Naveed Nieves MD   for opportunity to serve this patient      Please call with questions at-   24 Hrs Answering service [phone] or  7 am- 5 pm via Perfect serve or cell phone  Dr.Sudhir Carlitos Andres          CC/reason for consult :     Acute kidney injury and acidosis     HPI :     Jaye Dao. is a 78 y.o. male presented to   the hospital on 12/29/2019 with recurrent syncope    He has past medical history of hypertension, low back pain and neck fracture he presented after syncopal episode. Will describe that his legs gave way and he went down on his knees. He did not lose consciousness. He denies chest pain, palpitations, cough. He does have postnasal drip and has nausea vomiting for last 3 to 4 days. As per the patient he passed out 2-3 times in the last 1 week. He denies any lightheadedness, seizure-like episode or dizziness. He is placed on IV fluid. Hyzaar is discontinued.   He arrived with a creatinine of 3.3 which is down to 2.2. BUN was 60 and is improved to 43. Bicarbonate has worsened to 18. His baseline creatinine is around 1.9-2. I was called for further management of acute kidney injury on chronic kidney disease and metabolic acidosis     Interval History:     Urine output is 600 mL  Blood pressure is better  Cardiology is evaluating him for paroxysmal AV block. He may get a permanent pacemaker. ROS:     Seen with-nurse    positives in bold   Constitutional:  fever, chills, weakness, weight change, fatigue  Skin:  rash, pruritus, hair loss, bruising, dry skin, petechiae  Head, Face, Neck   headaches, swelling,  cervical adenopathy  Respiratory: shortness of breath, cough, or wheezing  Cardiovascular: chest pain, palpitations, dizzy, edema  Gastrointestinal: nausea, vomiting, diarrhea, constipation,belly pain    Yellow skin, blood in stool  Musculoskeletal:  back pain, muscle weakness, gait problems,       joint pain or swelling. Genitourinary:  dysuria, poor urine flow, flank pain, blood in urine  Neurologic:  vertigo, TIA'S, syncope, seizures, focal weakness  Psychosocial:  insomnia, anxiety, or depression. All other remaining systems are negative or unable to obtain        PMH/PSH/SH/Family History:     Past Medical History:   Diagnosis Date    Hypertension 5/18/2010    Low back pain 5/18/2010    Neck fracture [facility])        Past Surgical History:   Procedure Laterality Date    FRACTURE SURGERY      HIP SURGERY      left    NECK SURGERY      TOTAL HIP ARTHROPLASTY      right        reports that he has been smoking cigarettes. He has a 7.50 pack-year smoking history. He has never used smokeless tobacco. He reports that he does not drink alcohol or use drugs. family history includes Cancer in his father and mother; High Blood Pressure in his father.          Medication:     Current Facility-Administered Medications: sodium chloride flush 0.9 % injection 10 mL, 10 mL, Intravenous, 2 times per day  sodium chloride flush 0.9 % injection 10 mL, 10 mL, Intravenous, PRN  acetaminophen (TYLENOL) tablet 650 mg, 650 mg, Oral, Q4H PRN  magnesium hydroxide (MILK OF MAGNESIA) 400 MG/5ML suspension 30 mL, 30 mL, Oral, Daily PRN  ceFAZolin (ANCEF) 2 g in dextrose 5 % 50 mL IVPB, 2 g, Intravenous, Q8H  oxyCODONE-acetaminophen (PERCOCET) 5-325 MG per tablet 2 tablet, 2 tablet, Oral, Q6H PRN  traMADol (ULTRAM) tablet 50 mg, 50 mg, Oral, Q6H PRN  ondansetron (ZOFRAN-ODT) disintegrating tablet 4 mg, 4 mg, Oral, Q6H PRN  polyethylene glycol (GLYCOLAX) packet 17 g, 17 g, Oral, Daily  diazepam (VALIUM) tablet 10 mg, 10 mg, Oral, Q12H PRN  acetaminophen (TYLENOL) tablet 650 mg, 650 mg, Oral, Once  sodium chloride flush 0.9 % injection 10 mL, 10 mL, Intravenous, 2 times per day  sodium chloride flush 0.9 % injection 10 mL, 10 mL, Intravenous, PRN  ondansetron (ZOFRAN) injection 4 mg, 4 mg, Intravenous, Q6H PRN  heparin (porcine) injection 5,000 Units, 5,000 Units, Subcutaneous, 3 times per day  nicotine (NICODERM CQ) 21 MG/24HR 1 patch, 1 patch, Transdermal, Daily       Vitals :     Vitals:    01/04/20 1143   BP: 132/83   Pulse: 93   Resp: 17   Temp: 99.2 °F (37.3 °C)   SpO2: 95%       I & O :       Intake/Output Summary (Last 24 hours) at 1/4/2020 1523  Last data filed at 1/4/2020 1430  Gross per 24 hour   Intake 480 ml   Output 100 ml   Net 380 ml        Physical Examination :     General appearance: Anxious- no, distressed- no, in good spirits- yes    HEENT: Lips- normal, teeth- ok , oral mucosa- moist  Neck : Mass- no, appears symmetrical, JVD- not visible  Respiratory: Respiratory effort-okay, wheeze- no, crackles -none  Cardiovascular:  Ausculation- No M/R/G, Edema right upper extremities edema due to infiltration  Abdomen: visible mass- no, distention- no, scar- no, tenderness- no                            hepatosplenomegaly-  no  Musculoskeletal:  clubbing no,cyanosis- no , digital ischemia- no muscle strength- grossly normal , tone - grossly normal  Skin: rashes- no , ulcers- no, induration- no, tightening - no  Psychiatric:  Judgement and insight- normal           AAO X 3       LABS:     Recent Labs     01/04/20  1107   WBC 6.7   HGB 11.7*   HCT 34.5*        Recent Labs     01/03/20  2116      K 4.4      CO2 22   BUN 21*   CREATININE 1.6*   GLUCOSE 129*   PHOS 2.8

## 2020-01-04 NOTE — PROGRESS NOTES
This RN noticed signed and held orders not released from yesterday. Released and called pharmacy to verify if antibiotic should be given or not. Lizzy stated that he is okay to have antibiotic( not double dosing). Will mention this to AM MDs. Will continue to monitor.

## 2020-01-05 ENCOUNTER — APPOINTMENT (OUTPATIENT)
Dept: GENERAL RADIOLOGY | Age: 70
DRG: 243 | End: 2020-01-05
Payer: MEDICARE

## 2020-01-05 LAB
ANION GAP SERPL CALCULATED.3IONS-SCNC: 12 MMOL/L (ref 3–16)
ANION GAP SERPL CALCULATED.3IONS-SCNC: 16 MMOL/L (ref 3–16)
BASOPHILS ABSOLUTE: 0 K/UL (ref 0–0.2)
BASOPHILS ABSOLUTE: 0 K/UL (ref 0–0.2)
BASOPHILS RELATIVE PERCENT: 0 %
BASOPHILS RELATIVE PERCENT: 0.6 %
BILIRUBIN URINE: NEGATIVE
BLOOD, URINE: NEGATIVE
BUN BLDV-MCNC: 24 MG/DL (ref 7–20)
BUN BLDV-MCNC: 25 MG/DL (ref 7–20)
C-REACTIVE PROTEIN: 4.9 MG/L (ref 0–5.1)
CALCIUM SERPL-MCNC: 9.3 MG/DL (ref 8.3–10.6)
CALCIUM SERPL-MCNC: 9.4 MG/DL (ref 8.3–10.6)
CHLORIDE BLD-SCNC: 92 MMOL/L (ref 99–110)
CHLORIDE BLD-SCNC: 96 MMOL/L (ref 99–110)
CLARITY: CLEAR
CO2: 24 MMOL/L (ref 21–32)
CO2: 25 MMOL/L (ref 21–32)
COLOR: YELLOW
CREAT SERPL-MCNC: 1.8 MG/DL (ref 0.8–1.3)
CREAT SERPL-MCNC: 1.8 MG/DL (ref 0.8–1.3)
EOSINOPHILS ABSOLUTE: 0.1 K/UL (ref 0–0.6)
EOSINOPHILS ABSOLUTE: 0.2 K/UL (ref 0–0.6)
EOSINOPHILS RELATIVE PERCENT: 2 %
EOSINOPHILS RELATIVE PERCENT: 2.4 %
GFR AFRICAN AMERICAN: 45
GFR AFRICAN AMERICAN: 45
GFR NON-AFRICAN AMERICAN: 37
GFR NON-AFRICAN AMERICAN: 37
GLUCOSE BLD-MCNC: 133 MG/DL (ref 70–99)
GLUCOSE BLD-MCNC: 179 MG/DL (ref 70–99)
GLUCOSE URINE: NEGATIVE MG/DL
HCT VFR BLD CALC: 33.7 % (ref 40.5–52.5)
HCT VFR BLD CALC: 34.6 % (ref 40.5–52.5)
HEMOGLOBIN: 11.2 G/DL (ref 13.5–17.5)
HEMOGLOBIN: 11.7 G/DL (ref 13.5–17.5)
KETONES, URINE: ABNORMAL MG/DL
LEUKOCYTE ESTERASE, URINE: NEGATIVE
LYMPHOCYTES ABSOLUTE: 1.9 K/UL (ref 1–5.1)
LYMPHOCYTES ABSOLUTE: 2.4 K/UL (ref 1–5.1)
LYMPHOCYTES RELATIVE PERCENT: 27.8 %
LYMPHOCYTES RELATIVE PERCENT: 40 %
MAGNESIUM: 1.7 MG/DL (ref 1.8–2.4)
MCH RBC QN AUTO: 32.2 PG (ref 26–34)
MCH RBC QN AUTO: 32.4 PG (ref 26–34)
MCHC RBC AUTO-ENTMCNC: 33.3 G/DL (ref 31–36)
MCHC RBC AUTO-ENTMCNC: 33.8 G/DL (ref 31–36)
MCV RBC AUTO: 95.9 FL (ref 80–100)
MCV RBC AUTO: 96.7 FL (ref 80–100)
MICROSCOPIC EXAMINATION: ABNORMAL
MONOCYTES ABSOLUTE: 0.7 K/UL (ref 0–1.3)
MONOCYTES ABSOLUTE: 1.1 K/UL (ref 0–1.3)
MONOCYTES RELATIVE PERCENT: 12 %
MONOCYTES RELATIVE PERCENT: 16.4 %
NEUTROPHILS ABSOLUTE: 2.8 K/UL (ref 1.7–7.7)
NEUTROPHILS ABSOLUTE: 3.7 K/UL (ref 1.7–7.7)
NEUTROPHILS RELATIVE PERCENT: 46 %
NEUTROPHILS RELATIVE PERCENT: 52.8 %
NITRITE, URINE: NEGATIVE
PDW BLD-RTO: 12.1 % (ref 12.4–15.4)
PDW BLD-RTO: 12.2 % (ref 12.4–15.4)
PH UA: 7 (ref 5–8)
PLATELET # BLD: 143 K/UL (ref 135–450)
PLATELET # BLD: 168 K/UL (ref 135–450)
PMV BLD AUTO: 10.2 FL (ref 5–10.5)
PMV BLD AUTO: 11.4 FL (ref 5–10.5)
POTASSIUM SERPL-SCNC: 4.1 MMOL/L (ref 3.5–5.1)
POTASSIUM SERPL-SCNC: 5.6 MMOL/L (ref 3.5–5.1)
PROTEIN UA: NEGATIVE MG/DL
RBC # BLD: 3.48 M/UL (ref 4.2–5.9)
RBC # BLD: 3.61 M/UL (ref 4.2–5.9)
SODIUM BLD-SCNC: 132 MMOL/L (ref 136–145)
SODIUM BLD-SCNC: 133 MMOL/L (ref 136–145)
SPECIFIC GRAVITY UA: 1.02 (ref 1–1.03)
URINE TYPE: ABNORMAL
UROBILINOGEN, URINE: 0.2 E.U./DL
WBC # BLD: 6 K/UL (ref 4–11)
WBC # BLD: 6.9 K/UL (ref 4–11)

## 2020-01-05 PROCEDURE — 36415 COLL VENOUS BLD VENIPUNCTURE: CPT

## 2020-01-05 PROCEDURE — 87086 URINE CULTURE/COLONY COUNT: CPT

## 2020-01-05 PROCEDURE — 2060000000 HC ICU INTERMEDIATE R&B

## 2020-01-05 PROCEDURE — 97530 THERAPEUTIC ACTIVITIES: CPT

## 2020-01-05 PROCEDURE — 6370000000 HC RX 637 (ALT 250 FOR IP): Performed by: NURSE PRACTITIONER

## 2020-01-05 PROCEDURE — 71045 X-RAY EXAM CHEST 1 VIEW: CPT

## 2020-01-05 PROCEDURE — 2580000003 HC RX 258: Performed by: INTERNAL MEDICINE

## 2020-01-05 PROCEDURE — 83735 ASSAY OF MAGNESIUM: CPT

## 2020-01-05 PROCEDURE — 6370000000 HC RX 637 (ALT 250 FOR IP): Performed by: INTERNAL MEDICINE

## 2020-01-05 PROCEDURE — 6360000002 HC RX W HCPCS: Performed by: INTERNAL MEDICINE

## 2020-01-05 PROCEDURE — 6360000002 HC RX W HCPCS: Performed by: NURSE PRACTITIONER

## 2020-01-05 PROCEDURE — 99232 SBSQ HOSP IP/OBS MODERATE 35: CPT | Performed by: NURSE PRACTITIONER

## 2020-01-05 PROCEDURE — 85025 COMPLETE CBC W/AUTO DIFF WBC: CPT

## 2020-01-05 PROCEDURE — 97535 SELF CARE MNGMENT TRAINING: CPT

## 2020-01-05 PROCEDURE — 87040 BLOOD CULTURE FOR BACTERIA: CPT

## 2020-01-05 PROCEDURE — 86140 C-REACTIVE PROTEIN: CPT

## 2020-01-05 PROCEDURE — 51798 US URINE CAPACITY MEASURE: CPT

## 2020-01-05 PROCEDURE — 80048 BASIC METABOLIC PNL TOTAL CA: CPT

## 2020-01-05 PROCEDURE — 81003 URINALYSIS AUTO W/O SCOPE: CPT

## 2020-01-05 RX ORDER — MAGNESIUM SULFATE IN WATER 40 MG/ML
2 INJECTION, SOLUTION INTRAVENOUS ONCE
Status: COMPLETED | OUTPATIENT
Start: 2020-01-05 | End: 2020-01-05

## 2020-01-05 RX ADMIN — DIAZEPAM 10 MG: 5 TABLET ORAL at 21:38

## 2020-01-05 RX ADMIN — HEPARIN SODIUM 5000 UNITS: 5000 INJECTION INTRAVENOUS; SUBCUTANEOUS at 14:23

## 2020-01-05 RX ADMIN — ACETAMINOPHEN 650 MG: 325 TABLET ORAL at 17:36

## 2020-01-05 RX ADMIN — Medication 10 ML: at 21:56

## 2020-01-05 RX ADMIN — MAGNESIUM SULFATE HEPTAHYDRATE 2 G: 40 INJECTION, SOLUTION INTRAVENOUS at 10:26

## 2020-01-05 RX ADMIN — TRAMADOL HYDROCHLORIDE 50 MG: 50 TABLET ORAL at 21:38

## 2020-01-05 RX ADMIN — HEPARIN SODIUM 5000 UNITS: 5000 INJECTION INTRAVENOUS; SUBCUTANEOUS at 21:38

## 2020-01-05 RX ADMIN — HEPARIN SODIUM 5000 UNITS: 5000 INJECTION INTRAVENOUS; SUBCUTANEOUS at 06:16

## 2020-01-05 RX ADMIN — Medication 10 ML: at 09:12

## 2020-01-05 RX ADMIN — OXYCODONE HYDROCHLORIDE AND ACETAMINOPHEN 2 TABLET: 5; 325 TABLET ORAL at 15:16

## 2020-01-05 RX ADMIN — OXYCODONE HYDROCHLORIDE AND ACETAMINOPHEN 2 TABLET: 5; 325 TABLET ORAL at 09:11

## 2020-01-05 RX ADMIN — ACETAMINOPHEN 650 MG: 325 TABLET ORAL at 04:24

## 2020-01-05 ASSESSMENT — PAIN - FUNCTIONAL ASSESSMENT
PAIN_FUNCTIONAL_ASSESSMENT: PREVENTS OR INTERFERES SOME ACTIVE ACTIVITIES AND ADLS

## 2020-01-05 ASSESSMENT — PAIN SCALES - GENERAL
PAINLEVEL_OUTOF10: 6
PAINLEVEL_OUTOF10: 0
PAINLEVEL_OUTOF10: 6
PAINLEVEL_OUTOF10: 8
PAINLEVEL_OUTOF10: 8
PAINLEVEL_OUTOF10: 0
PAINLEVEL_OUTOF10: 8
PAINLEVEL_OUTOF10: 6
PAINLEVEL_OUTOF10: 0
PAINLEVEL_OUTOF10: 8
PAINLEVEL_OUTOF10: 0
PAINLEVEL_OUTOF10: 7
PAINLEVEL_OUTOF10: 7

## 2020-01-05 ASSESSMENT — PAIN DESCRIPTION - PAIN TYPE
TYPE: ACUTE PAIN

## 2020-01-05 ASSESSMENT — PAIN DESCRIPTION - LOCATION
LOCATION: SHOULDER;ARM

## 2020-01-05 ASSESSMENT — PAIN DESCRIPTION - ORIENTATION
ORIENTATION: LEFT

## 2020-01-05 ASSESSMENT — PAIN DESCRIPTION - FREQUENCY
FREQUENCY: CONTINUOUS

## 2020-01-05 ASSESSMENT — PAIN DESCRIPTION - ONSET
ONSET: ON-GOING

## 2020-01-05 ASSESSMENT — PAIN DESCRIPTION - PROGRESSION
CLINICAL_PROGRESSION: GRADUALLY WORSENING
CLINICAL_PROGRESSION: GRADUALLY WORSENING
CLINICAL_PROGRESSION: NOT CHANGED
CLINICAL_PROGRESSION: NOT CHANGED

## 2020-01-05 ASSESSMENT — PAIN DESCRIPTION - DESCRIPTORS
DESCRIPTORS: ACHING

## 2020-01-05 NOTE — PLAN OF CARE
On Call  Called by RN for PPM site pain    Pxt seen and examined    Chart and data reviewed        BP (!) 141/78   Pulse 102   Temp 99.1 °F (37.3 °C) (Oral)   Resp 18   Ht 5' 11\" (1.803 m)   Wt 158 lb 1.1 oz (71.7 kg)   SpO2 96%   BMI 22.05 kg/m²     HEENT: supple neck  Chest: Pacemaker site appears swollen, tender with some erythema. Chest o/w CTAB  ABDomen: soft NT  Extremities: no knee swelling, no edema      Assessment  Pxt noted to be warm on my exam, temperature taken: 100.7      SIRS, with fever and tachycardia  Possible sepsis      Possible sources: PPM site, or lungs with hx of recurrent vomiting      Plan:    Will obtain blood cultures  Stat CBC with diff, CRP, BMP  Repeat CXR    PPM site may need to be evaluated for developing infection    Empiric BSA: Vanc and Zosyn pending further eval    ID consulted by primary            Anthony Hatch MD

## 2020-01-05 NOTE — PROGRESS NOTES
Epic flagging sepsis notification. Pt scored a 5. Dr. Maame Myrick notified via perfect serve. Md stated to ignore it. Will continue to monitor. 0'\"   1/4/20 11:52 AM   [phone] Hospital or Facility: [place] [address] From: Arina Peña RE: Deangelo Sy RM: [id number] Hey good morning, his epic his flagging \"sepsis\" and he scored a 5. How would you like to continue?  Need Callback: NO CALLBACK REQ Telemetry  Read 11:57 AM   0'\"   1/4/20 11:57 AM   Ignore it

## 2020-01-05 NOTE — PROGRESS NOTES
HOSPITAL MEDICINE  - PROGRESS NOTE    Admit Date: 12/29/2019       Chief complaints syncope      Interval History: *78 y.o. male who presents after? Syncopal episode patient states that his legs gave away and he went down on his knees and unclear he lost consciousness.   -has been having a sinus infection with postnasal drip and was vomiting and  having diarrhea over the past 3 to 4 days  - has passed out 2-3 times over the past week. No  chest pain, shortness of breath, palpitations but has been feeling lightheaded prior during these episodes.     Continues to have low-grade fever yesterday 100.9  No cough  No abdominal pain  No diarrhea  No dysuria  Pacemaker site looks clean      Diet: DIET CARDIAC;      Data:   Scheduled Meds: Reviewed  Continuous Infusions:      Intake/Output Summary (Last 24 hours) at 1/5/2020 1251  Last data filed at 1/5/2020 1001  Gross per 24 hour   Intake 1165 ml   Output 450 ml   Net 715 ml     CBC:   Recent Labs     01/05/20  0452   WBC 6.0   HGB 11.7*        BMP:  Recent Labs     01/05/20  0452   *   K 5.6*   CL 96*   CO2 24   BUN 25*   CREATININE 1.8*   GLUCOSE 133*         Objective:   Vitals: /71   Pulse 92   Temp 98.7 °F (37.1 °C) (Oral)   Resp 18   Ht 5' 11\" (1.803 m)   Wt 158 lb 1.1 oz (71.7 kg)   SpO2 98%   BMI 22.05 kg/m²   General appearance: alert, appears stated age and cooperative  Skin: Skin color, texture, turgor normal.   HEENT: Head: Normocephalic, no lesions, without obvious abnormality.   Neck: supple  Lungs: clear to auscultation bilaterally  Heart: regular rate and rhythm, S1, S2 normal, no murmur, click, rub or gallop  Abdomen: soft, non-tender; bowel sounds normal; no masses,  no organomegaly  Extremities: - blister upper rt arm with mod swelling and decr ROM,rt shoulder  Neurologic: Mental status: Alert, oriented, thought content appropriate  Overall no change in physical exam from

## 2020-01-05 NOTE — PLAN OF CARE
Problem: Falls - Risk of:  Goal: Will remain free from falls  Description  Will remain free from falls  1/5/2020 1355 by Bria Caldwell RN  Outcome: Ongoing  Pt will remain free from accidental injury this shift. Pt has fall risk measures (fall risk bracelet, fall risk sign, fall risk cloth, non-slip socks, dome light on) in place. Pt bed is in low position, bed alarm on, bed wheels locked, call light within reach, bedside table within reach, chair wheels locked, chair alarm on. Problem: Pain:  Description  Pain management should include both nonpharmacologic and pharmacologic interventions. Goal: Pain level will decrease  Description  Pain level will decrease  1/5/2020 1355 by Bria Caldwell RN  Outcome: Ongoing  Pt has c/o acute 8/10 left shoulder/chest wall pain r/t PPM placement. Pt has received Percocet, ice and heat therapy for pain. Will continue to monitor pain and provide pain relieving measures as necessary     Problem: Cardiac:  Goal: Ability to maintain vital signs within normal range will improve  Description  Ability to maintain vital signs within normal range will improve  Outcome: Ongoing  Vitals:    01/05/20 0417 01/05/20 0615 01/05/20 0904 01/05/20 1130   BP: 122/83  117/66 130/71   Pulse: 104  89 92   Resp: 18  16 18   Temp: 100.9 °F (38.3 °C) 98.3 °F (36.8 °C) 99.5 °F (37.5 °C) 98.7 °F (37.1 °C)   TempSrc: Oral Oral Oral Oral   SpO2: 94%  96% 98%   Weight:       Height:         Pt VSS this shift. Pt denies SOB, CP, n/v/d/c, HA, vision changes, lightheadedness, dizziness, fever, chills, and abd pain. Pt had dual chamber PPM placed on 1/3/2020 and is showing no signs of cardiovascular impairment at this time. Will continue to monitor cardiac function.

## 2020-01-05 NOTE — PROGRESS NOTES
arrived with a creatinine of 3.3 which is down to 2.2. BUN was 60 and is improved to 43. Bicarbonate has worsened to 18. His baseline creatinine is around 1.9-2. I was called for further management of acute kidney injury on chronic kidney disease and metabolic acidosis     Interval History:     Urine output is 600 mL  Blood pressure is better  Cardiology is evaluating him for paroxysmal AV block. He may get a permanent pacemaker. ROS:     Seen with-nurse    positives in bold   Constitutional:  fever, chills, weakness, weight change, fatigue  Skin:  rash, pruritus, hair loss, bruising, dry skin, petechiae  Head, Face, Neck   headaches, swelling,  cervical adenopathy  Respiratory: shortness of breath, cough, or wheezing  Cardiovascular: chest pain, palpitations, dizzy, edema  Gastrointestinal: nausea, vomiting, diarrhea, constipation,belly pain    Yellow skin, blood in stool  Musculoskeletal:  back pain, muscle weakness, gait problems,       joint pain or swelling. Genitourinary:  dysuria, poor urine flow, flank pain, blood in urine  Neurologic:  vertigo, TIA'S, syncope, seizures, focal weakness  Psychosocial:  insomnia, anxiety, or depression. All other remaining systems are negative or unable to obtain        PMH/PSH/SH/Family History:     Past Medical History:   Diagnosis Date    Hypertension 5/18/2010    Low back pain 5/18/2010    Neck fracture [facility])        Past Surgical History:   Procedure Laterality Date    FRACTURE SURGERY      HIP SURGERY      left    NECK SURGERY      TOTAL HIP ARTHROPLASTY      right        reports that he has been smoking cigarettes. He has a 7.50 pack-year smoking history. He has never used smokeless tobacco. He reports that he does not drink alcohol or use drugs. family history includes Cancer in his father and mother; High Blood Pressure in his father.          Medication:     Current Facility-Administered Medications: acetaminophen (TYLENOL) tablet 650 mg, 650 mg, Oral, Q4H PRN  magnesium hydroxide (MILK OF MAGNESIA) 400 MG/5ML suspension 30 mL, 30 mL, Oral, Daily PRN  oxyCODONE-acetaminophen (PERCOCET) 5-325 MG per tablet 2 tablet, 2 tablet, Oral, Q6H PRN  traMADol (ULTRAM) tablet 50 mg, 50 mg, Oral, Q6H PRN  ondansetron (ZOFRAN-ODT) disintegrating tablet 4 mg, 4 mg, Oral, Q6H PRN  polyethylene glycol (GLYCOLAX) packet 17 g, 17 g, Oral, Daily  diazepam (VALIUM) tablet 10 mg, 10 mg, Oral, Q12H PRN  acetaminophen (TYLENOL) tablet 650 mg, 650 mg, Oral, Once  sodium chloride flush 0.9 % injection 10 mL, 10 mL, Intravenous, 2 times per day  sodium chloride flush 0.9 % injection 10 mL, 10 mL, Intravenous, PRN  ondansetron (ZOFRAN) injection 4 mg, 4 mg, Intravenous, Q6H PRN  heparin (porcine) injection 5,000 Units, 5,000 Units, Subcutaneous, 3 times per day  nicotine (NICODERM CQ) 21 MG/24HR 1 patch, 1 patch, Transdermal, Daily       Vitals :     Vitals:    01/05/20 1523   BP: (!) 141/78   Pulse: 102   Resp: 18   Temp: 99.1 °F (37.3 °C)   SpO2: 96%       I & O :       Intake/Output Summary (Last 24 hours) at 1/5/2020 1722  Last data filed at 1/5/2020 1426  Gross per 24 hour   Intake 985 ml   Output 575 ml   Net 410 ml        Physical Examination :     General appearance: Anxious- no, distressed- no, in good spirits- yes    HEENT: Lips- normal, teeth- ok , oral mucosa- moist  Neck : Mass- no, appears symmetrical, JVD- not visible  Respiratory: Respiratory effort-okay, wheeze- no, crackles -none  Cardiovascular:  Ausculation- No M/R/G, Edema right upper extremities edema due to infiltration  Abdomen: visible mass- no, distention- no, scar- no, tenderness- no                            hepatosplenomegaly-  no  Musculoskeletal:  clubbing no,cyanosis- no , digital ischemia- no                           muscle strength- grossly normal , tone - grossly normal  Skin: rashes- no , ulcers- no, induration- no, tightening - no  Psychiatric:  Judgement and insight-

## 2020-01-05 NOTE — PROGRESS NOTES
shoulder ROM 2/2 pain following pacemaker insertion, elbow flexion AROM ~75% of range 2/2 pain; WFL distally.  Educated pt on elbow to hand AROM exercises to complete to maintain ROM/strength  RUE AROM (degrees)  RUE AROM : WFL        Plan   Plan  Times per week: 2-5  Times per day: Daily  Current Treatment Recommendations: Endurance Training, Patient/Caregiver Education & Training, Self-Care / ADL, Functional Mobility Training, Safety Education & Training    AM-PAC Score  AM-PAC Inpatient Daily Activity Raw Score: 17 (01/05/20 1447)  AM-PAC Inpatient ADL T-Scale Score : 37.26 (01/05/20 1447)  ADL Inpatient CMS 0-100% Score: 50.11 (01/05/20 1447)  ADL Inpatient CMS G-Code Modifier : CK (01/05/20 1447)    Goals  Short term goals  Time Frame for Short term goals: discharge  Short term goal 1: pt will complete don LB clothing Mod I - not met  Short term goal 2: pt will complete toilet transfer Mod I - not met  Short term goal 3: new goal 1/5: min A UB ADLs   Patient Goals   Patient goals : to go home       Therapy Time   Individual Concurrent Group Co-treatment   Time In 5401         Time Out 1341         Minutes 46         Timed Code Treatment Minutes:   46  Total Treatment Minutes:  55     If patient is d/c prior to next treatment session, this note will serve as the discharge summary    Docea Power OTR/L, 1930

## 2020-01-06 PROBLEM — I80.8 PHLEBITIS OF RIGHT ARM: Status: ACTIVE | Noted: 2020-01-06

## 2020-01-06 PROBLEM — R50.9 FEVER: Status: ACTIVE | Noted: 2020-01-06

## 2020-01-06 LAB
ANION GAP SERPL CALCULATED.3IONS-SCNC: 11 MMOL/L (ref 3–16)
BASOPHILS ABSOLUTE: 0 K/UL (ref 0–0.2)
BASOPHILS RELATIVE PERCENT: 0.4 %
BUN BLDV-MCNC: 25 MG/DL (ref 7–20)
CALCIUM SERPL-MCNC: 9.4 MG/DL (ref 8.3–10.6)
CHLORIDE BLD-SCNC: 96 MMOL/L (ref 99–110)
CO2: 27 MMOL/L (ref 21–32)
CREAT SERPL-MCNC: 1.8 MG/DL (ref 0.8–1.3)
EOSINOPHILS ABSOLUTE: 0.2 K/UL (ref 0–0.6)
EOSINOPHILS RELATIVE PERCENT: 2.5 %
GFR AFRICAN AMERICAN: 45
GFR NON-AFRICAN AMERICAN: 37
GLUCOSE BLD-MCNC: 127 MG/DL (ref 70–99)
HCT VFR BLD CALC: 34.2 % (ref 40.5–52.5)
HEMOGLOBIN: 11.5 G/DL (ref 13.5–17.5)
LYMPHOCYTES ABSOLUTE: 2.8 K/UL (ref 1–5.1)
LYMPHOCYTES RELATIVE PERCENT: 40.7 %
MCH RBC QN AUTO: 32.6 PG (ref 26–34)
MCHC RBC AUTO-ENTMCNC: 33.5 G/DL (ref 31–36)
MCV RBC AUTO: 97.1 FL (ref 80–100)
MONOCYTES ABSOLUTE: 1 K/UL (ref 0–1.3)
MONOCYTES RELATIVE PERCENT: 15 %
NEUTROPHILS ABSOLUTE: 2.8 K/UL (ref 1.7–7.7)
NEUTROPHILS RELATIVE PERCENT: 41.4 %
PDW BLD-RTO: 12.1 % (ref 12.4–15.4)
PLATELET # BLD: 170 K/UL (ref 135–450)
PMV BLD AUTO: 10.4 FL (ref 5–10.5)
POTASSIUM SERPL-SCNC: 4.1 MMOL/L (ref 3.5–5.1)
RBC # BLD: 3.53 M/UL (ref 4.2–5.9)
REPORT: NORMAL
RESPIRATORY PANEL PCR: NORMAL
SODIUM BLD-SCNC: 134 MMOL/L (ref 136–145)
URINE CULTURE, ROUTINE: NORMAL
WBC # BLD: 6.8 K/UL (ref 4–11)

## 2020-01-06 PROCEDURE — 6370000000 HC RX 637 (ALT 250 FOR IP): Performed by: INTERNAL MEDICINE

## 2020-01-06 PROCEDURE — 6370000000 HC RX 637 (ALT 250 FOR IP): Performed by: NURSE PRACTITIONER

## 2020-01-06 PROCEDURE — 2580000003 HC RX 258: Performed by: INTERNAL MEDICINE

## 2020-01-06 PROCEDURE — 2060000000 HC ICU INTERMEDIATE R&B

## 2020-01-06 PROCEDURE — 85025 COMPLETE CBC W/AUTO DIFF WBC: CPT

## 2020-01-06 PROCEDURE — 80048 BASIC METABOLIC PNL TOTAL CA: CPT

## 2020-01-06 PROCEDURE — 36415 COLL VENOUS BLD VENIPUNCTURE: CPT

## 2020-01-06 PROCEDURE — 0100U HC RESPIRPTHGN MULT REV TRANS & AMP PRB TECH 21 TRGT: CPT

## 2020-01-06 PROCEDURE — 99223 1ST HOSP IP/OBS HIGH 75: CPT | Performed by: INTERNAL MEDICINE

## 2020-01-06 PROCEDURE — 6360000002 HC RX W HCPCS: Performed by: INTERNAL MEDICINE

## 2020-01-06 PROCEDURE — 99232 SBSQ HOSP IP/OBS MODERATE 35: CPT | Performed by: NURSE PRACTITIONER

## 2020-01-06 RX ADMIN — HEPARIN SODIUM 5000 UNITS: 5000 INJECTION INTRAVENOUS; SUBCUTANEOUS at 05:42

## 2020-01-06 RX ADMIN — DIAZEPAM 10 MG: 5 TABLET ORAL at 21:14

## 2020-01-06 RX ADMIN — TRAMADOL HYDROCHLORIDE 50 MG: 50 TABLET ORAL at 18:26

## 2020-01-06 RX ADMIN — OXYCODONE HYDROCHLORIDE AND ACETAMINOPHEN 2 TABLET: 5; 325 TABLET ORAL at 14:21

## 2020-01-06 RX ADMIN — POLYETHYLENE GLYCOL 3350 17 G: 17 POWDER, FOR SOLUTION ORAL at 08:28

## 2020-01-06 RX ADMIN — CEFTAROLINE FOSAMIL 400 MG: 400 POWDER, FOR SOLUTION INTRAVENOUS at 19:19

## 2020-01-06 RX ADMIN — Medication 10 ML: at 08:31

## 2020-01-06 RX ADMIN — OXYCODONE HYDROCHLORIDE AND ACETAMINOPHEN 2 TABLET: 5; 325 TABLET ORAL at 21:14

## 2020-01-06 RX ADMIN — TRAMADOL HYDROCHLORIDE 50 MG: 50 TABLET ORAL at 11:18

## 2020-01-06 RX ADMIN — TRAMADOL HYDROCHLORIDE 50 MG: 50 TABLET ORAL at 04:10

## 2020-01-06 RX ADMIN — OXYCODONE HYDROCHLORIDE AND ACETAMINOPHEN 2 TABLET: 5; 325 TABLET ORAL at 01:53

## 2020-01-06 RX ADMIN — HEPARIN SODIUM 5000 UNITS: 5000 INJECTION INTRAVENOUS; SUBCUTANEOUS at 21:15

## 2020-01-06 RX ADMIN — HEPARIN SODIUM 5000 UNITS: 5000 INJECTION INTRAVENOUS; SUBCUTANEOUS at 13:46

## 2020-01-06 RX ADMIN — OXYCODONE HYDROCHLORIDE AND ACETAMINOPHEN 2 TABLET: 5; 325 TABLET ORAL at 07:58

## 2020-01-06 ASSESSMENT — PAIN DESCRIPTION - LOCATION
LOCATION: CHEST

## 2020-01-06 ASSESSMENT — PAIN DESCRIPTION - FREQUENCY
FREQUENCY: CONTINUOUS

## 2020-01-06 ASSESSMENT — PAIN SCALES - GENERAL
PAINLEVEL_OUTOF10: 6
PAINLEVEL_OUTOF10: 3
PAINLEVEL_OUTOF10: 8
PAINLEVEL_OUTOF10: 7
PAINLEVEL_OUTOF10: 4
PAINLEVEL_OUTOF10: 6
PAINLEVEL_OUTOF10: 9
PAINLEVEL_OUTOF10: 4
PAINLEVEL_OUTOF10: 7
PAINLEVEL_OUTOF10: 3
PAINLEVEL_OUTOF10: 8
PAINLEVEL_OUTOF10: 3
PAINLEVEL_OUTOF10: 8

## 2020-01-06 ASSESSMENT — PAIN DESCRIPTION - DESCRIPTORS
DESCRIPTORS: ACHING
DESCRIPTORS: ACHING;TENDER;THROBBING
DESCRIPTORS: ACHING;THROBBING
DESCRIPTORS: ACHING
DESCRIPTORS: ACHING;THROBBING
DESCRIPTORS: ACHING;THROBBING;TENDER
DESCRIPTORS: ACHING;TENDER;THROBBING

## 2020-01-06 ASSESSMENT — PAIN - FUNCTIONAL ASSESSMENT

## 2020-01-06 ASSESSMENT — PAIN DESCRIPTION - PROGRESSION
CLINICAL_PROGRESSION: NOT CHANGED
CLINICAL_PROGRESSION: GRADUALLY WORSENING
CLINICAL_PROGRESSION: NOT CHANGED
CLINICAL_PROGRESSION: GRADUALLY IMPROVING
CLINICAL_PROGRESSION: NOT CHANGED

## 2020-01-06 ASSESSMENT — PAIN DESCRIPTION - ONSET
ONSET: ON-GOING

## 2020-01-06 ASSESSMENT — PAIN DESCRIPTION - ORIENTATION
ORIENTATION: LEFT

## 2020-01-06 ASSESSMENT — ENCOUNTER SYMPTOMS
GASTROINTESTINAL NEGATIVE: 1
RESPIRATORY NEGATIVE: 1

## 2020-01-06 ASSESSMENT — PAIN DESCRIPTION - PAIN TYPE
TYPE: SURGICAL PAIN

## 2020-01-06 ASSESSMENT — PAIN DESCRIPTION - DIRECTION: RADIATING_TOWARDS: SHOULDER

## 2020-01-06 NOTE — CARE COORDINATION
Case Management Assessment           Daily Note                 Date/ Time of Note: 1/6/2020 4:29 PM         Note completed by: Opal Diaz    Patient Name: Faith Guan. YOB: 1950    Diagnosis:Acute renal failure (ARF) ([address] [N17.9]  Patient Admission Status: Inpatient    Date of Admission:12/29/2019  5:12 PM Length of Stay: 8 GLOS:        Current Plan of Care: Patient having fevers    ________________________________________________________________________________________  PT AM-PAC: 18 / 24 per last evaluation on: 1.6    OT AM-PAC: 17 / 24 per last evaluation on: 1.6    DME Needs for discharge: cane, has at home    ________________________________________________________________________________________  Discharge Plan: Home    Tentative discharge date: TBD    Current barriers to discharge: fever    ________________________________________________________________________________________  Case Management Notes:  Patient is from home alone, will be going to stay with his sister when discharged. Patient states his sister is a nurse and can help with any needs. SM to follow up with patient at discharge as home care may be ordered. ID consulted today and pt is now in contact to rule out the flu. Yuan Cyr and his family were provided with choice of provider; he and his family are in agreement with the discharge plan.     Care Transition Patient: Yes    Opal Diaz RN  [facility]  Case Management Department  Ph: [phone]  Fax: [phone]

## 2020-01-06 NOTE — PROGRESS NOTES
Progress Note    Admit Date: 12/29/2019  Day: 8  Diet: DIET CARDIAC; Low Potassium  Dietary Nutrition Supplements: Low Calorie High Protein Supplement    CC: presyncope, falls     Interval history: Patient seen and examined at bedside this morning. Overnight, Tmax 100.2 with tachycardia (100s-120). Patient denies fevers/chills, CP, SOB, abdominal pain, nausea/vomiting. Urine culture NGTD. Blood cultures pending. Complaining of tenderness around pacemaker site which improves with ice. Eating and drinking well. HPI: Mr. John White is a 77 yo M with PMHx HTN, DDD, neuropathy who presents after multiple falls and ?syncope, found to be in intermittent CHB s/p PPM insertion on 1/3/20, now with fevers. Medications:     Scheduled Meds:   polyethylene glycol  17 g Oral Daily    acetaminophen  650 mg Oral Once    sodium chloride flush  10 mL Intravenous 2 times per day    heparin (porcine)  5,000 Units Subcutaneous 3 times per day    nicotine  1 patch Transdermal Daily     Continuous Infusions:  PRN Meds:acetaminophen, magnesium hydroxide, oxyCODONE-acetaminophen, traMADol, ondansetron, diazepam, sodium chloride flush, ondansetron    Objective:   Vitals:   T-max:  Patient Vitals for the past 8 hrs:   BP Temp Temp src Pulse Resp SpO2   01/06/20 0751 124/83 97.6 °F (36.4 °C) Oral 88 18 95 %   01/06/20 0415 122/76 97.5 °F (36.4 °C) Oral 91 18 97 %       Intake/Output Summary (Last 24 hours) at 1/6/2020 1007  Last data filed at 1/6/2020 0831  Gross per 24 hour   Intake 490 ml   Output 750 ml   Net -260 ml       Review of Systems   Constitutional: Negative. HENT: Negative. Respiratory: Negative. Cardiovascular: Negative. Gastrointestinal: Negative. Skin:        Tenderness, pacemaker site        Physical Exam  Constitutional:       General: He is not in acute distress. Appearance: Normal appearance. HENT:      Head: Normocephalic.       Nose: Nose normal.   Eyes:      Extraocular Movements: Extraocular movements intact. Conjunctiva/sclera: Conjunctivae normal.   Cardiovascular:      Rate and Rhythm: Normal rate and regular rhythm. Comments: Pacemaker site unremarkable. Bandage clean, dry and intact. Moderate tenderness to palpation but no drainage, warmth, erythema or swelling noted. Musculoskeletal: Normal range of motion. General: No swelling. Neurological:      General: No focal deficit present. Mental Status: He is alert. Psychiatric:         Mood and Affect: Mood normal.         LABS:    CBC:   Recent Labs     01/05/20 0452 01/05/20 1930 01/06/20 0458   WBC 6.0 6.9 6.8   HGB 11.7* 11.2* 11.5*   HCT 34.6* 33.7* 34.2*    168 170   MCV 95.9 96.7 97.1     Renal:    Recent Labs     01/03/20 2116 01/05/20 0452 01/05/20 1930 01/06/20 0458    132* 133* 134*   K 4.4 5.6* 4.1 4.1    96* 92* 96*   CO2 22 24 25 27   BUN 21* 25* 24* 25*   CREATININE 1.6* 1.8* 1.8* 1.8*   GLUCOSE 129* 133* 179* 127*   CALCIUM 9.0 9.3 9.4 9.4   MG  --  1.70*  --   --    PHOS 2.8  --   --   --    ANIONGAP 16 12 16 11     Hepatic:   Recent Labs     01/03/20 2116   LABALBU 3.2*     Troponin: No results for input(s): TROPONINI in the last 72 hours. BNP: No results for input(s): BNP in the last 72 hours. Lipids: No results for input(s): CHOL, HDL in the last 72 hours. Invalid input(s): LDLCALCU, TRIGLYCERIDE  ABGs:  No results for input(s): PHART, CZJ4MBT, PO2ART, JIM2ZHO, BEART, THGBART, V9OLQWHD, IUE0KZK in the last 72 hours. INR: No results for input(s): INR in the last 72 hours. Lactate: No results for input(s): LACTATE in the last 72 hours. Cultures:  -----------------------------------------------------------------  RAD:   XR CHEST 1 VW   Final Result   1. No acute disease. XR CHEST STANDARD (2 VW)   Final Result   Status post bipolar pacemaker in good position. No pleural air. US RENAL COMPLETE   Final Result      No hydronephrosis.       Suspected medical renal Follow-up blood cultures  - ID consulted, appreciate recs  - Will hold off on antibiotics at this time as no clear course  - Tylenol PRN    SURY on CKD-3- Cr stable at 1.8 (down from 3.3 on admission), baseline appears to be around ~2.0-2.3   - Nephro following  - Urinary output fair (-850mL)  - Blood pressure stable, will cont to hold home Hyzaar  - Avoid nephrotoxic agents     R shoulder pain 2/2 avascular necrosis vs impaction fracture- Stable. No orthopedic intervention warranted.    - Cont Percocet, Tramadol PRN     HTN- Stable  - Holding home Hyzaar in setting of SURY on CKD    Tobacco use   - Nicotine patch     Code Status: Full   FEN: Cardiac diet   PPX: SubQ heparin   DISPO: Maricarmen Hill MD, PGY-2  01/06/20  10:07 AM    This patient has been staffed and discussed with Paul Kumari MD.

## 2020-01-06 NOTE — PROGRESS NOTES
08/25/2011    LDLDIRECT 33 04/16/2019    LDLCALC see below 04/16/2019    TRIG 497 04/16/2019     LIVER PROFILE:  Lab Results   Component Value Date    AST 50 04/16/2019    AST <5 06/28/2018    ALT 45 04/16/2019    ALT <5 06/28/2018       -----------------------------------------------------------------  Telemetry: Personally reviewed  NSR, ST on tele    Objective:   Vitals: /83   Pulse 88   Temp 97.6 °F (36.4 °C) (Oral)   Resp 18   Ht 5' 11\" (1.803 m)   Wt 158 lb 1.1 oz (71.7 kg)   SpO2 95%   BMI 22.05 kg/m²   General appearance: alert, appears stated age and cooperative, No acute distress   Eyes: Conjunctiva and pupils normal and reactive  Skin: Skin color, texture, turgor normal. No rashes or ecchymosis. L chest incision CDI, erythema vs ecchymosis surrounding incision  Neck: no JVD, supple, symmetrical, trachea midline   Lungs: , no accessory muscle use, no respiratory distress  Heart: RRR  Abdomen: soft, non-tender; bowel sounds normal  Extremities: No edema, DP +  Psychiatric: normal insight and affect    Patient Active Problem List:     Hypertension     Low back pain     Neck pain     Hand pain, right     Insomnia     Neuropathy     Elevated LFTs     DDD (degenerative disc disease), lumbosacral     Acute renal failure (ARF) (HCC)     Pacemaker        Assessment & Plan:      1. Frequent falls  2. Syncope  3. Intermittent CHB  4. Symptomatic bradycardia  5. PPM  6. Low grade temp    79 y/o man with a h/o HTN, recurrent falls and syncope, p/w a fall, noted to have intermittent CHB on tele, s/p dual chamber MDT PPM (1/3/19, Dr. Ananda Pemberton).      CHB  - S/p dual chamber MDT PPM  - Device check showed stable parameters  - L chest incision CDI  - Chest Xray showed stable lead placement  - Wound care and activity restrictions reviewed with patient  - Keep Mg > 2.0 -       Elevated temp   - UA, C&S  - CXR 1/4/20 unremarkable  - ID to see      Levell Kyler [address]

## 2020-01-06 NOTE — CONSULTS
the current history, physical findings, labs and assessment and plan and agree with note as documented by resident (Dr. Sly Hammer). 79 yo man with hx HTN, CKD  Admit 9/29 with syncopy, dx CHB, had PM placed on 1/3/20. Pt developed fever starting on 1/4 (Tm 100.5), 1/5 (Tm 100.9)  - R arm redness and swelling from site of former iv catheter  On exam, area with blister, no induration, no remarkable swelling, no open wound  - PM site with swelling, tenderness    IMP/  Fever, onset in hosp, poss related to arm, poss to PM.  No clear that either is source of fever though pt did have a peripheral iv related phlebitis. PM site appears more edematous than I would expect yet he is only POD#3. REC/  Start ceftaroline 400 mg q 12  Ask EP to evaluation PM site  Monitor -     Discussed with pt, family (including 2 sister, 1 who is retired nurse from [employer] and other who is active nurse on L&D at [employer]).   Brooke Marroquin MD

## 2020-01-06 NOTE — PROGRESS NOTES
Nutrition Assessment (Low Risk)    Type and Reason for Visit: Reassess    Nutrition Recommendations:   · Continue cardiac,low potassium diet   · Start Ensure HP BID  · Monitor nutrition adequacy, pertinent labs, bowel habits, wt changes, and clinical progress     Nutrition Assessment:  Patient assessed for nutritional risk. Deemed to be at low risk at this time. Will continue to monitor for changes in status. Pt remains nutritionally stable this am. Pt voicing that his food sometimes gets stuck and he has had a hard time keeping foods down d/t his hx of neck fracture/neck pain. Pt denies nausea at this time. Voices that he needs to eat slower. Enocuraged slow eating and chewing foods well. Pt was drinking ONS. Will re-add ONS to reflect diet orders and continue to monitor per [facility].      Malnutrition Assessment:  · Malnutrition Status: No malnutrition    Nutrition Risk Level   Risk Level: Low    Nutrition Diagnosis:   · Problem: Swallowing difficulty  · Etiology: Pain    Signs and symptoms: Patient report of(neck pain d/t neck fracture and needing to chew foods well/eat slowly)    Nutrition Intervention:  Food and/or Delivery: Continue current diet, Start ONS  Nutrition Education/Counseling/Coordination of Care:  Continued Inpatient Monitoring      Electronically signed by Isaias Zuñiga RD, LD on 1/6/20 at 9:54 AM    Contact Number: [phone]

## 2020-01-06 NOTE — PROGRESS NOTES
[facility]    [facility]              [phone]                       Plan :     Stable renal function. Fair urine output. Blood pressure stable. No changes in plan today. Assessment :     Stage III chronic kidney disease since 2010: His creatinine was 1.5 in June 2010 and has gradually worsened to 2.3 as of 2019. Ultrasound shows bilateral 10 cm length. Serum for free light chains with SPEP and UPEP       Acute kidney injury: He arrived with a creatinine of 3.3 from a baseline of 2.3    At home he was taking Hyzaar. He did not receive IV contrast  He has no documented significant hypotension    By history it appears prerenal and improved with IV fluid. Hyperlipidemia: Cholesterol is 138 with an LDL of 33    He has mixed anion gap and non-anion gap metabolic acidosis: It is likely from acute on chronic kidney disease and use of normal saline which is causing hyperchloremic metabolic acidosis. Echocardiogram showed EF of 55 to 60%. He has mild LVH. [facility] would like to thank Shae Ramirez MD   for opportunity to serve this patient      Please call with questions at-   24 Hrs Answering service [phone] or  7 am- 5 pm via Perfect serve or cell phone  Jose Peter          CC/reason for consult :     Acute kidney injury and acidosis     HPI :     Faith Fleming is a 78 y.o. male presented to   the hospital on 12/29/2019 with recurrent syncope    He has past medical history of hypertension, low back pain and neck fracture he presented after syncopal episode. Will describe that his legs gave way and he went down on his knees. He did not lose consciousness. He denies chest pain, palpitations, cough. He does have postnasal drip and has nausea vomiting for last 3 to 4 days. As per the patient he passed out 2-3 times in the last 1 week. He denies any lightheadedness, seizure-like episode or dizziness. He is placed on IV fluid. PRN  magnesium hydroxide (MILK OF MAGNESIA) 400 MG/5ML suspension 30 mL, 30 mL, Oral, Daily PRN  oxyCODONE-acetaminophen (PERCOCET) 5-325 MG per tablet 2 tablet, 2 tablet, Oral, Q6H PRN  traMADol (ULTRAM) tablet 50 mg, 50 mg, Oral, Q6H PRN  ondansetron (ZOFRAN-ODT) disintegrating tablet 4 mg, 4 mg, Oral, Q6H PRN  polyethylene glycol (GLYCOLAX) packet 17 g, 17 g, Oral, Daily  diazepam (VALIUM) tablet 10 mg, 10 mg, Oral, Q12H PRN  acetaminophen (TYLENOL) tablet 650 mg, 650 mg, Oral, Once  sodium chloride flush 0.9 % injection 10 mL, 10 mL, Intravenous, 2 times per day  sodium chloride flush 0.9 % injection 10 mL, 10 mL, Intravenous, PRN  ondansetron (ZOFRAN) injection 4 mg, 4 mg, Intravenous, Q6H PRN  heparin (porcine) injection 5,000 Units, 5,000 Units, Subcutaneous, 3 times per day  nicotine (NICODERM CQ) 21 MG/24HR 1 patch, 1 patch, Transdermal, Daily       Vitals :     Vitals:    01/06/20 0751   BP: 124/83   Pulse: 88   Resp: 18   Temp: 97.6 °F (36.4 °C)   SpO2: 95%       I & O :       Intake/Output Summary (Last 24 hours) at 1/6/2020 0848  Last data filed at 1/6/2020 0831  Gross per 24 hour   Intake 1210 ml   Output 850 ml   Net 360 ml        Physical Examination :     General appearance: Anxious- no, distressed- no, in good spirits- yes    HEENT: Lips- normal, teeth- ok , oral mucosa- moist  Neck : Mass- no, appears symmetrical, JVD- not visible  Respiratory: Respiratory effort-okay, wheeze- no, crackles -none  Cardiovascular:  Ausculation- No M/R/G, Edema right upper extremities edema due to infiltration  Abdomen: visible mass- no, distention- no, scar- no, tenderness- no                            hepatosplenomegaly-  no  Musculoskeletal:  clubbing no,cyanosis- no , digital ischemia- no                           muscle strength- grossly normal , tone - grossly normal  Skin: rashes- no , ulcers- no, induration- no, tightening - no  Psychiatric:  Judgement and insight- normal           AAO X 3       LABS:

## 2020-01-07 LAB
ANION GAP SERPL CALCULATED.3IONS-SCNC: 16 MMOL/L (ref 3–16)
BASOPHILS ABSOLUTE: 0 K/UL (ref 0–0.2)
BASOPHILS RELATIVE PERCENT: 0.6 %
BUN BLDV-MCNC: 22 MG/DL (ref 7–20)
CALCIUM SERPL-MCNC: 9.4 MG/DL (ref 8.3–10.6)
CHLORIDE BLD-SCNC: 96 MMOL/L (ref 99–110)
CO2: 23 MMOL/L (ref 21–32)
CREAT SERPL-MCNC: 1.6 MG/DL (ref 0.8–1.3)
EOSINOPHILS ABSOLUTE: 0.2 K/UL (ref 0–0.6)
EOSINOPHILS RELATIVE PERCENT: 3.2 %
GFR AFRICAN AMERICAN: 52
GFR NON-AFRICAN AMERICAN: 43
GLUCOSE BLD-MCNC: 113 MG/DL (ref 70–99)
HCT VFR BLD CALC: 33.9 % (ref 40.5–52.5)
HEMOGLOBIN: 11.2 G/DL (ref 13.5–17.5)
LYMPHOCYTES ABSOLUTE: 2.5 K/UL (ref 1–5.1)
LYMPHOCYTES RELATIVE PERCENT: 39.4 %
MAGNESIUM: 1.8 MG/DL (ref 1.8–2.4)
MCH RBC QN AUTO: 32.4 PG (ref 26–34)
MCHC RBC AUTO-ENTMCNC: 33 G/DL (ref 31–36)
MCV RBC AUTO: 98.1 FL (ref 80–100)
MONOCYTES ABSOLUTE: 0.8 K/UL (ref 0–1.3)
MONOCYTES RELATIVE PERCENT: 13.1 %
NEUTROPHILS ABSOLUTE: 2.8 K/UL (ref 1.7–7.7)
NEUTROPHILS RELATIVE PERCENT: 43.7 %
PDW BLD-RTO: 12.4 % (ref 12.4–15.4)
PLATELET # BLD: 182 K/UL (ref 135–450)
PMV BLD AUTO: 10.6 FL (ref 5–10.5)
POTASSIUM SERPL-SCNC: 4.5 MMOL/L (ref 3.5–5.1)
RBC # BLD: 3.46 M/UL (ref 4.2–5.9)
SODIUM BLD-SCNC: 135 MMOL/L (ref 136–145)
WBC # BLD: 6.4 K/UL (ref 4–11)

## 2020-01-07 PROCEDURE — 6370000000 HC RX 637 (ALT 250 FOR IP): Performed by: STUDENT IN AN ORGANIZED HEALTH CARE EDUCATION/TRAINING PROGRAM

## 2020-01-07 PROCEDURE — 2580000003 HC RX 258: Performed by: INTERNAL MEDICINE

## 2020-01-07 PROCEDURE — 6360000002 HC RX W HCPCS: Performed by: INTERNAL MEDICINE

## 2020-01-07 PROCEDURE — 83735 ASSAY OF MAGNESIUM: CPT

## 2020-01-07 PROCEDURE — 99232 SBSQ HOSP IP/OBS MODERATE 35: CPT | Performed by: NURSE PRACTITIONER

## 2020-01-07 PROCEDURE — 80048 BASIC METABOLIC PNL TOTAL CA: CPT

## 2020-01-07 PROCEDURE — 6370000000 HC RX 637 (ALT 250 FOR IP): Performed by: NURSE PRACTITIONER

## 2020-01-07 PROCEDURE — 85025 COMPLETE CBC W/AUTO DIFF WBC: CPT

## 2020-01-07 PROCEDURE — 36415 COLL VENOUS BLD VENIPUNCTURE: CPT

## 2020-01-07 PROCEDURE — 2060000000 HC ICU INTERMEDIATE R&B

## 2020-01-07 PROCEDURE — 6370000000 HC RX 637 (ALT 250 FOR IP): Performed by: INTERNAL MEDICINE

## 2020-01-07 PROCEDURE — 99232 SBSQ HOSP IP/OBS MODERATE 35: CPT | Performed by: INTERNAL MEDICINE

## 2020-01-07 RX ORDER — DIPHENHYDRAMINE HYDROCHLORIDE, ZINC ACETATE 2; .1 G/100G; G/100G
CREAM TOPICAL 3 TIMES DAILY PRN
Status: DISCONTINUED | OUTPATIENT
Start: 2020-01-07 | End: 2020-01-08 | Stop reason: HOSPADM

## 2020-01-07 RX ORDER — LANOLIN ALCOHOL/MO/W.PET/CERES
400 CREAM (GRAM) TOPICAL DAILY
Status: DISCONTINUED | OUTPATIENT
Start: 2020-01-07 | End: 2020-01-08

## 2020-01-07 RX ORDER — HYDROXYZINE HYDROCHLORIDE 10 MG/1
10 TABLET, FILM COATED ORAL 3 TIMES DAILY PRN
Status: DISCONTINUED | OUTPATIENT
Start: 2020-01-07 | End: 2020-01-08 | Stop reason: HOSPADM

## 2020-01-07 RX ADMIN — TRAMADOL HYDROCHLORIDE 50 MG: 50 TABLET ORAL at 16:06

## 2020-01-07 RX ADMIN — OXYCODONE HYDROCHLORIDE AND ACETAMINOPHEN 2 TABLET: 5; 325 TABLET ORAL at 18:49

## 2020-01-07 RX ADMIN — OXYCODONE HYDROCHLORIDE AND ACETAMINOPHEN 2 TABLET: 5; 325 TABLET ORAL at 04:52

## 2020-01-07 RX ADMIN — Medication 10 ML: at 19:49

## 2020-01-07 RX ADMIN — TRAMADOL HYDROCHLORIDE 50 MG: 50 TABLET ORAL at 09:09

## 2020-01-07 RX ADMIN — CEFTAROLINE FOSAMIL 400 MG: 400 POWDER, FOR SOLUTION INTRAVENOUS at 09:09

## 2020-01-07 RX ADMIN — TRAMADOL HYDROCHLORIDE 50 MG: 50 TABLET ORAL at 02:46

## 2020-01-07 RX ADMIN — Medication 400 MG: at 12:40

## 2020-01-07 RX ADMIN — TRAMADOL HYDROCHLORIDE 50 MG: 50 TABLET ORAL at 21:54

## 2020-01-07 RX ADMIN — CEFTAROLINE FOSAMIL 400 MG: 400 POWDER, FOR SOLUTION INTRAVENOUS at 19:48

## 2020-01-07 RX ADMIN — Medication 10 ML: at 09:09

## 2020-01-07 RX ADMIN — HEPARIN SODIUM 5000 UNITS: 5000 INJECTION INTRAVENOUS; SUBCUTANEOUS at 21:54

## 2020-01-07 RX ADMIN — HEPARIN SODIUM 5000 UNITS: 5000 INJECTION INTRAVENOUS; SUBCUTANEOUS at 13:22

## 2020-01-07 RX ADMIN — HYDROXYZINE HYDROCHLORIDE 10 MG: 10 TABLET, FILM COATED ORAL at 10:44

## 2020-01-07 RX ADMIN — OXYCODONE HYDROCHLORIDE AND ACETAMINOPHEN 2 TABLET: 5; 325 TABLET ORAL at 12:40

## 2020-01-07 RX ADMIN — DIAZEPAM 10 MG: 5 TABLET ORAL at 21:53

## 2020-01-07 RX ADMIN — HEPARIN SODIUM 5000 UNITS: 5000 INJECTION INTRAVENOUS; SUBCUTANEOUS at 04:52

## 2020-01-07 RX ADMIN — DIPHENHYDRAMINE HYDROCHLORIDE, ZINC ACETATE: 2; .1 CREAM TOPICAL at 12:32

## 2020-01-07 ASSESSMENT — PAIN DESCRIPTION - ONSET
ONSET: ON-GOING

## 2020-01-07 ASSESSMENT — PAIN DESCRIPTION - PROGRESSION
CLINICAL_PROGRESSION: NOT CHANGED
CLINICAL_PROGRESSION: GRADUALLY WORSENING
CLINICAL_PROGRESSION: GRADUALLY IMPROVING
CLINICAL_PROGRESSION: NOT CHANGED
CLINICAL_PROGRESSION: NOT CHANGED
CLINICAL_PROGRESSION: GRADUALLY WORSENING
CLINICAL_PROGRESSION: NOT CHANGED

## 2020-01-07 ASSESSMENT — PAIN SCALES - GENERAL
PAINLEVEL_OUTOF10: 0
PAINLEVEL_OUTOF10: 0
PAINLEVEL_OUTOF10: 6
PAINLEVEL_OUTOF10: 0
PAINLEVEL_OUTOF10: 6
PAINLEVEL_OUTOF10: 0
PAINLEVEL_OUTOF10: 10
PAINLEVEL_OUTOF10: 6
PAINLEVEL_OUTOF10: 4
PAINLEVEL_OUTOF10: 6
PAINLEVEL_OUTOF10: 0
PAINLEVEL_OUTOF10: 6
PAINLEVEL_OUTOF10: 5
PAINLEVEL_OUTOF10: 8
PAINLEVEL_OUTOF10: 5

## 2020-01-07 ASSESSMENT — PAIN DESCRIPTION - LOCATION
LOCATION: CHEST
LOCATION: CHEST;SHOULDER
LOCATION: CHEST
LOCATION: CHEST;SHOULDER
LOCATION: CHEST;SHOULDER

## 2020-01-07 ASSESSMENT — PAIN DESCRIPTION - DESCRIPTORS
DESCRIPTORS: ACHING

## 2020-01-07 ASSESSMENT — PAIN DESCRIPTION - FREQUENCY
FREQUENCY: CONTINUOUS

## 2020-01-07 ASSESSMENT — ENCOUNTER SYMPTOMS
ROS SKIN COMMENTS: ITCHINESS
RESPIRATORY NEGATIVE: 1

## 2020-01-07 ASSESSMENT — PAIN DESCRIPTION - PAIN TYPE
TYPE: SURGICAL PAIN

## 2020-01-07 ASSESSMENT — PAIN DESCRIPTION - ORIENTATION
ORIENTATION: LEFT

## 2020-01-07 NOTE — PROGRESS NOTES
last 72 hours. Lactate: No results for input(s): LACTATE in the last 72 hours. Cultures:  -----------------------------------------------------------------  RAD:   XR CHEST 1 VW   Final Result   1. No acute disease. XR CHEST STANDARD (2 VW)   Final Result   Status post bipolar pacemaker in good position. No pleural air. US RENAL COMPLETE   Final Result      No hydronephrosis. Suspected medical renal disease. CT SHOULDER RIGHT WO CONTRAST   Final Result      Prominent sclerotic zone of the humeral head with central subchondral crescent of low density, findings consistent with avascular necrosis. Impaction related fracture might appear similarly. CT CERVICAL SPINE WO CONTRAST   Final Result      1. No acute fracture with stable appearing hardware and pedicle screws and fixation rods from C3 through the inferior margin of C6   2. Straightening of the alignment with partial fusion at C4-5 and no evidence of bony fusion of C5-6 and mature fusion stable at C3-4   3. Sclerotic changes stable and irregularities of the C5 and C6 vertebral bodies      XR TOE RIGHT (MIN 2 VIEWS)   Final Result      No sign of any acute fracture or radiopaque foreign body. Soft tissue swelling overlying the second toe distally but no discrete cortical bony defect, limited as described above       XR CHEST STANDARD (2 VW)   Final Result      No acute process or active consolidation, slightly tortuous aorta noted. .          Assessment/Plan:   Mr. Bisi Kulkarni is a 77 yo M with PMHx HTN, DDD, neuropathy who presents after multiple falls and ?syncope, found to be in intermittent CHB s/p PPM insertion on 1/3/20, now with fevers     Syncope 2/2 intermittent complete heart block s/p PM insertion on 1/3/2020- Patient doing well post-procedure. Still with moderate tenderness to palpation around PM site; however, site appears unremarkable.    - Cards following  - Cont telemetry   - Monitor lytes, replace as needed   - Cont wound care   - PT/OT     Fevers, unclear etiology- Resolving. Patient does not meet SIRS/Sepsis criteria. Blood cultures, urine cultures NGTD. CXR unremarkable. No cough, vomiting or diarrhea. Due to persistent fevers and moderate tenderness around PM site, antibiotics were started on 1/6. He has remained afebrile for the last 24 hours after initiation of antibiotics. - Blood cultures, urine cultures NGTD  - Respi panel negaitve  - ID following  - Cont ceftaroline 400mg Q 12H per ID recs  - Tylenol PRN for fevers     SURY on CKD-3- Cr continues to downtrend, stable at 1.6 this morning (down from 3.3 on admission), baseline appears to be around ~2.0-2.3. Good urinary output.   - Nephro following  - Blood pressure stable, will cont to hold home Hyzaar  - Avoid nephrotoxic agents      R shoulder pain 2/2 avascular necrosis vs impaction fracture- Stable. No orthopedic intervention warranted. - Cont Percocet, Tramadol PRN     HTN- Stable  - Holding home Hyzaar in setting of SURY on CKD     Tobacco use   - Nicotine patch     Pruritis, chronic- Patient complaining of diffuse itchiness which is chronic in nature.  Typically takes benadryl at home to alleviate symptoms.   - Start Atarax 10mg TID  - Start Benadryl cream      Code Status: Full   FEN: Cardiac diet   PPX: SubQ heparin   DISPO: Sabi Payne MD, PGY-2  01/07/20  9:43 AM    This patient has been staffed and discussed with Amy Garcia MD.

## 2020-01-07 NOTE — PLAN OF CARE
Problem: Falls - Risk of:  Goal: Will remain free from falls  Description  Will remain free from falls  Outcome: Ongoing  Note:   Pt resting in bed, up to bathroom with 1x assist. Bed alarm on, high risk precautions in place. Call light and bedside items within reach. Will update as needed. Problem: Infection:  Goal: Will remain free from infection  Description  Will remain free from infection  Outcome: Ongoing  Note:   Pacemaker site red, warm to touch, painful. Respiratory Panel negative. Will update as needed. Problem: Pain:  Goal: Patient's pain/discomfort is manageable  Description  Patient's pain/discomfort is manageable  Outcome: Ongoing  Note:   Pain controlled with ordered percocet, tramadol. Will update as needed.

## 2020-01-07 NOTE — PROGRESS NOTES
placed on IV fluid. Hyzaar is discontinued. He arrived with a creatinine of 3.3 which is down to 2.2. BUN was 60 and is improved to 43. Bicarbonate has worsened to 18. His baseline creatinine is around 1.9-2. I was called for further management of acute kidney injury on chronic kidney disease and metabolic acidosis     Interval History:     Blood pressure is better  Is status post permanent pacemaker placement. .    ROS:     Seen with-with resident    positives in bold   Constitutional:  fever, chills, weakness, weight change, fatigue  Skin:  rash, pruritus, hair loss, bruising, dry skin, petechiae  Head, Face, Neck   headaches, swelling,  cervical adenopathy  Respiratory: shortness of breath, cough, or wheezing  Cardiovascular: chest pain, palpitations, dizzy, edema  Gastrointestinal: nausea, vomiting, diarrhea, constipation,belly pain    Yellow skin, blood in stool  Musculoskeletal:  back pain, muscle weakness, gait problems,       joint pain or swelling. Genitourinary:  dysuria, poor urine flow, flank pain, blood in urine  Neurologic:  vertigo, TIA'S, syncope, seizures, focal weakness  Psychosocial:  insomnia, anxiety, or depression. All other remaining systems are negative or unable to obtain        PMH/PSH/SH/Family History:     Past Medical History:   Diagnosis Date    Hypertension 5/18/2010    Low back pain 5/18/2010    Neck fracture [facility])        Past Surgical History:   Procedure Laterality Date    FRACTURE SURGERY      HIP SURGERY      left    NECK SURGERY      TOTAL HIP ARTHROPLASTY      right        reports that he has been smoking cigarettes. He has a 7.50 pack-year smoking history. He has never used smokeless tobacco. He reports that he does not drink alcohol or use drugs. family history includes Cancer in his father and mother; High Blood Pressure in his father.          Medication:     Current Facility-Administered Medications: ceftaroline fosamil (TEFLARO) 400

## 2020-01-07 NOTE — PROGRESS NOTES
ID Follow-up NOTE  RESIDENT NOTE - reviewed / edited, attending note at bottom    CC:   Low grade fever  Antibiotics: Ceftaroline    Admit Date: 12/29/2019  Hospital Day: 10    Subjective:     Patient remained afebrile overnight. He states that he felt cold early in the evening but an extra blanket helped. He endorses tenderness surrounding his pace maker site and right upper extremity. He is tolerating a diet well. Denies chest pain, shortness of breath, abdominal pain, nausea, vomiting, diarrhea, and constipation      Objective:     Patient Vitals for the past 8 hrs:   BP Temp Temp src Pulse Resp SpO2   01/07/20 0824 121/78 98.2 °F (36.8 °C) Oral 81 16 96 %   01/07/20 0600 -- -- -- 86 -- --   01/07/20 0445 123/68 98.7 °F (37.1 °C) Oral 90 16 93 %   01/07/20 0200 -- -- -- 94 -- --     I/O last 3 completed shifts: In: 1020 [P.O.:960; I.V.:10; IV Piggyback:50]  Out: 5296 [EMXXQ:6025]  I/O this shift:  In: -   Out: 100 [Urine:100]    EXAM:  GENERAL:      No apparent distress. HEENT:           Membranes moist, no oral lesion  NECK:             Supple  LUNGS:           Clear b/l, no rales, no dullness  CARDIAC:       RRR, no murmur appreciated, Chest tender and red surrounding pacemaker implantation site. Dressing in place. ABD:                + BS, soft / NT  EXT:                Broken, tender, and erythematous blisters on right arm.   Worse compared to photo in EPIC  NEURO:          No focal neurologic findings  PSYCH:           Orientation, sensorium, mood normal  LINES:             Peripheral iv       Data Review:  Lab Results   Component Value Date    WBC 6.4 01/07/2020    HGB 11.2 (L) 01/07/2020    HCT 33.9 (L) 01/07/2020    MCV 98.1 01/07/2020     01/07/2020     Lab Results   Component Value Date    CREATININE 1.6 (H) 01/07/2020    BUN 22 (H) 01/07/2020     (L) 01/07/2020    K 4.5 01/07/2020    CL 96 (L) 01/07/2020    CO2 23 01/07/2020       Hepatic Function Panel:   Lab Results   Component Value Date    ALKPHOS 139 04/16/2019    ALT 45 04/16/2019    AST 50 04/16/2019    PROT 8.1 12/29/2019    PROT 7.1 10/16/2012    BILITOT 0.4 04/16/2019    BILIDIR 0.0 10/20/2013    IBILI 0.7 10/20/2013    LABALBU 3.2 01/03/2020       MICRO:  1/6 Resp PCR negative  1/5 UA- No signs of infection, culture no growth  1/5 Blood Culture- No growth to date  12/29 Influenza A / B antigen negative    IMAGING:  Chest XRAY (1/5/20): Normal heart size. No effusion or consolidation. No acute osseous abnormality. Mediastinal contours are unremarkable. Stable left-sided pacemaker. Scheduled Meds:   ceftaroline fosamil (TEFLARO) IVPB  400 mg Intravenous Q12H    polyethylene glycol  17 g Oral Daily    acetaminophen  650 mg Oral Once    sodium chloride flush  10 mL Intravenous 2 times per day    heparin (porcine)  5,000 Units Subcutaneous 3 times per day    nicotine  1 patch Transdermal Daily       Continuous Infusions:      PRN Meds:  acetaminophen, magnesium hydroxide, oxyCODONE-acetaminophen, traMADol, ondansetron, diazepam, sodium chloride flush, ondansetron      Assessment:   Mr. Delmer Gay is a 78year-old-male with a PMHx of hypertenson, and CKD 3 who originally presented with syncope due to complete heart block. ID consult for fever that developed after hospitalization. Cellulitis, surrounding pacemaker placement site   Patient has tenderness and erythema surrounding the site of his pacemaker placement. He also has increased erythema and tenderness of his right upper extremity from blisters that have burst secondary to an infiltrated IV in December. Plan:     · Continue Ceftaroline 400mg q12 hr    Discussed with Dr. Cristobal Mckeon. Steven Nix     Addendum to Resident Progress note:  Pt seen, examined and evaluated.  I have reviewed the current history, physical findings, labs and assessment and plan and agree with note as documented by resident (Dr. Jesus Holguin).     79 yo man with hx HTN, CKD  Admit 9/29 with syncopy, dx CHB, had PM placed on 1/3/20.     Pt developed fever starting on 1/4 (Tm 100.5), 1/5 (Tm 100.9)  - R arm redness and swelling from site of former iv catheter  On exam, area with blister, no induration, no remarkable swelling, no open wound  - PM site with swelling, tenderness    Today 1/7/20 - pt reports that he had chills, followed by sweats last PM (no fever documented in Epic)  Currently, he is tried. No change in L upper chest / PM site discomfort     IMP/  Fever, onset in hosp, poss related to arm, poss to PM.  No clear that either is source of fever though pt did have a peripheral iv related phlebitis. PM site appears more edematous than I would expect yet he is only POD#4.     REC/  Cont ceftaroline 400 mg q 12  Monitor     If becomes afebrile, would change to oral clindamycin or doxycycline and complete empiric course     On 1/6/20 - discussed impression and plan with family (including 2 sister, 1 who is retired nurse from [employer] and other who is active nurse on L&D at [employer]). \    Dani Salamanca MD

## 2020-01-07 NOTE — PROGRESS NOTES
Electrophysiology - PROGRESS NOTE    Admit Date: 12/29/2019     Chief Complaint: Syncope, fall     Interval History:   Patient seen and examined and notes reviewed. Patient is being followed for syncope and frequent falls, noted to intermittent CHB on tele, s/p dual chamber PPM 1/3/19. Patient with c/o pain overnight. Prn pain med not controlling pain - percocet added prn, c/o pain with minimal touch. Patient with low grade temp, max overnight 100.2. ID consulted. CXR showed stable lead placement. Seen by ID and placed on ATB. Remains with L chest incisional discomfort and R arm pain.      In: 530 [P.O.:480]  Out: 1425    Wt Readings from Last 2 Encounters:   12/30/19 158 lb 1.1 oz (71.7 kg)   04/16/19 160 lb (72.6 kg)       Data:   Scheduled Meds:   Scheduled Meds:   ceftaroline fosamil (TEFLARO) IVPB  400 mg Intravenous Q12H    polyethylene glycol  17 g Oral Daily    acetaminophen  650 mg Oral Once    sodium chloride flush  10 mL Intravenous 2 times per day    heparin (porcine)  5,000 Units Subcutaneous 3 times per day    nicotine  1 patch Transdermal Daily     Continuous Infusions:  PRN Meds:.acetaminophen, magnesium hydroxide, oxyCODONE-acetaminophen, traMADol, ondansetron, diazepam, sodium chloride flush, ondansetron  Continuous Infusions:    Intake/Output Summary (Last 24 hours) at 1/7/2020 0811  Last data filed at 1/7/2020 0725  Gross per 24 hour   Intake 1020 ml   Output 1775 ml   Net -755 ml       CBC:   Lab Results   Component Value Date    WBC 6.4 01/07/2020    HGB 11.2 01/07/2020     01/07/2020     BMP:  Lab Results   Component Value Date     01/07/2020    K 4.5 01/07/2020    K 3.5 12/30/2019    CL 96 01/07/2020    CO2 23 01/07/2020    BUN 22 01/07/2020    CREATININE 1.6 01/07/2020    GLUCOSE 113 01/07/2020     INR: No results found for: INR     CARDIAC LABS  ENZYMES:No results for input(s): CKMB, CKMBINDEX, TROPONINI in the last > 2.0 - will po daily dose  - F/u next week    Elevated temp   - ID on board  - IV ATB      Dayna Pena [address]

## 2020-01-08 VITALS
RESPIRATION RATE: 16 BRPM | DIASTOLIC BLOOD PRESSURE: 78 MMHG | SYSTOLIC BLOOD PRESSURE: 125 MMHG | HEART RATE: 89 BPM | TEMPERATURE: 98.4 F | BODY MASS INDEX: 22.13 KG/M2 | WEIGHT: 158.07 LBS | HEIGHT: 71 IN | OXYGEN SATURATION: 96 %

## 2020-01-08 LAB
ANION GAP SERPL CALCULATED.3IONS-SCNC: 14 MMOL/L (ref 3–16)
BASOPHILS ABSOLUTE: 0.1 K/UL (ref 0–0.2)
BASOPHILS RELATIVE PERCENT: 1.4 %
BUN BLDV-MCNC: 24 MG/DL (ref 7–20)
CALCIUM SERPL-MCNC: 9.7 MG/DL (ref 8.3–10.6)
CHLORIDE BLD-SCNC: 95 MMOL/L (ref 99–110)
CO2: 25 MMOL/L (ref 21–32)
CREAT SERPL-MCNC: 1.8 MG/DL (ref 0.8–1.3)
EOSINOPHILS ABSOLUTE: 0.2 K/UL (ref 0–0.6)
EOSINOPHILS RELATIVE PERCENT: 4.1 %
GFR AFRICAN AMERICAN: 45
GFR NON-AFRICAN AMERICAN: 37
GLUCOSE BLD-MCNC: 96 MG/DL (ref 70–99)
HCT VFR BLD CALC: 34 % (ref 40.5–52.5)
HEMOGLOBIN: 11.5 G/DL (ref 13.5–17.5)
LYMPHOCYTES ABSOLUTE: 2.5 K/UL (ref 1–5.1)
LYMPHOCYTES RELATIVE PERCENT: 41.9 %
MAGNESIUM: 1.8 MG/DL (ref 1.8–2.4)
MCH RBC QN AUTO: 32.5 PG (ref 26–34)
MCHC RBC AUTO-ENTMCNC: 33.8 G/DL (ref 31–36)
MCV RBC AUTO: 96.1 FL (ref 80–100)
MONOCYTES ABSOLUTE: 0.9 K/UL (ref 0–1.3)
MONOCYTES RELATIVE PERCENT: 14.3 %
NEUTROPHILS ABSOLUTE: 2.3 K/UL (ref 1.7–7.7)
NEUTROPHILS RELATIVE PERCENT: 38.3 %
PDW BLD-RTO: 12.2 % (ref 12.4–15.4)
PLATELET # BLD: 158 K/UL (ref 135–450)
PMV BLD AUTO: 10.7 FL (ref 5–10.5)
POTASSIUM SERPL-SCNC: 5 MMOL/L (ref 3.5–5.1)
RBC # BLD: 3.53 M/UL (ref 4.2–5.9)
REASON FOR REJECTION: NORMAL
REJECTED TEST: NORMAL
SODIUM BLD-SCNC: 134 MMOL/L (ref 136–145)
WBC # BLD: 6 K/UL (ref 4–11)

## 2020-01-08 PROCEDURE — 6360000002 HC RX W HCPCS: Performed by: INTERNAL MEDICINE

## 2020-01-08 PROCEDURE — 6370000000 HC RX 637 (ALT 250 FOR IP): Performed by: INTERNAL MEDICINE

## 2020-01-08 PROCEDURE — 6370000000 HC RX 637 (ALT 250 FOR IP): Performed by: STUDENT IN AN ORGANIZED HEALTH CARE EDUCATION/TRAINING PROGRAM

## 2020-01-08 PROCEDURE — 85025 COMPLETE CBC W/AUTO DIFF WBC: CPT

## 2020-01-08 PROCEDURE — 97530 THERAPEUTIC ACTIVITIES: CPT

## 2020-01-08 PROCEDURE — 6370000000 HC RX 637 (ALT 250 FOR IP): Performed by: NURSE PRACTITIONER

## 2020-01-08 PROCEDURE — 83735 ASSAY OF MAGNESIUM: CPT

## 2020-01-08 PROCEDURE — 80048 BASIC METABOLIC PNL TOTAL CA: CPT

## 2020-01-08 PROCEDURE — 36415 COLL VENOUS BLD VENIPUNCTURE: CPT

## 2020-01-08 PROCEDURE — 99232 SBSQ HOSP IP/OBS MODERATE 35: CPT | Performed by: INTERNAL MEDICINE

## 2020-01-08 PROCEDURE — 99232 SBSQ HOSP IP/OBS MODERATE 35: CPT | Performed by: NURSE PRACTITIONER

## 2020-01-08 PROCEDURE — 2580000003 HC RX 258: Performed by: INTERNAL MEDICINE

## 2020-01-08 PROCEDURE — 97535 SELF CARE MNGMENT TRAINING: CPT

## 2020-01-08 RX ORDER — LANOLIN ALCOHOL/MO/W.PET/CERES
400 CREAM (GRAM) TOPICAL 2 TIMES DAILY
Status: DISCONTINUED | OUTPATIENT
Start: 2020-01-08 | End: 2020-01-08

## 2020-01-08 RX ORDER — DOXYCYCLINE 100 MG/1
100 CAPSULE ORAL 2 TIMES DAILY
Qty: 20 CAPSULE | Refills: 0 | Status: SHIPPED | OUTPATIENT
Start: 2020-01-08 | End: 2020-01-18

## 2020-01-08 RX ORDER — DOXYCYCLINE 100 MG/1
100 CAPSULE ORAL EVERY 12 HOURS SCHEDULED
Status: DISCONTINUED | OUTPATIENT
Start: 2020-01-08 | End: 2020-01-08 | Stop reason: HOSPADM

## 2020-01-08 RX ORDER — DOXYCYCLINE 100 MG/1
100 CAPSULE ORAL 2 TIMES DAILY
Qty: 20 CAPSULE | Refills: 0 | Status: SHIPPED | OUTPATIENT
Start: 2020-01-08 | End: 2020-01-08

## 2020-01-08 RX ADMIN — HEPARIN SODIUM 5000 UNITS: 5000 INJECTION INTRAVENOUS; SUBCUTANEOUS at 04:30

## 2020-01-08 RX ADMIN — OXYCODONE HYDROCHLORIDE AND ACETAMINOPHEN 2 TABLET: 5; 325 TABLET ORAL at 16:31

## 2020-01-08 RX ADMIN — TRAMADOL HYDROCHLORIDE 50 MG: 50 TABLET ORAL at 04:29

## 2020-01-08 RX ADMIN — OXYCODONE HYDROCHLORIDE AND ACETAMINOPHEN 2 TABLET: 5; 325 TABLET ORAL at 00:45

## 2020-01-08 RX ADMIN — TRAMADOL HYDROCHLORIDE 50 MG: 50 TABLET ORAL at 11:20

## 2020-01-08 RX ADMIN — OXYCODONE HYDROCHLORIDE AND ACETAMINOPHEN 2 TABLET: 5; 325 TABLET ORAL at 09:15

## 2020-01-08 RX ADMIN — HYDROXYZINE HYDROCHLORIDE 10 MG: 10 TABLET, FILM COATED ORAL at 04:29

## 2020-01-08 RX ADMIN — DOXYCYCLINE 100 MG: 100 CAPSULE ORAL at 09:10

## 2020-01-08 RX ADMIN — Medication 10 ML: at 09:11

## 2020-01-08 ASSESSMENT — PAIN DESCRIPTION - ORIENTATION
ORIENTATION: LEFT

## 2020-01-08 ASSESSMENT — PAIN DESCRIPTION - PROGRESSION
CLINICAL_PROGRESSION: NOT CHANGED
CLINICAL_PROGRESSION: GRADUALLY WORSENING
CLINICAL_PROGRESSION: GRADUALLY WORSENING
CLINICAL_PROGRESSION: NOT CHANGED

## 2020-01-08 ASSESSMENT — PAIN SCALES - GENERAL
PAINLEVEL_OUTOF10: 6
PAINLEVEL_OUTOF10: 8
PAINLEVEL_OUTOF10: 3
PAINLEVEL_OUTOF10: 0
PAINLEVEL_OUTOF10: 6
PAINLEVEL_OUTOF10: 4
PAINLEVEL_OUTOF10: 8
PAINLEVEL_OUTOF10: 8
PAINLEVEL_OUTOF10: 9
PAINLEVEL_OUTOF10: 6
PAINLEVEL_OUTOF10: 6
PAINLEVEL_OUTOF10: 5

## 2020-01-08 ASSESSMENT — PAIN DESCRIPTION - LOCATION
LOCATION: CHEST;SHOULDER

## 2020-01-08 ASSESSMENT — PAIN - FUNCTIONAL ASSESSMENT
PAIN_FUNCTIONAL_ASSESSMENT: PREVENTS OR INTERFERES SOME ACTIVE ACTIVITIES AND ADLS

## 2020-01-08 ASSESSMENT — PAIN DESCRIPTION - FREQUENCY
FREQUENCY: CONTINUOUS

## 2020-01-08 ASSESSMENT — PAIN DESCRIPTION - DESCRIPTORS
DESCRIPTORS: ACHING

## 2020-01-08 ASSESSMENT — PAIN DESCRIPTION - PAIN TYPE
TYPE: SURGICAL PAIN

## 2020-01-08 ASSESSMENT — PAIN DESCRIPTION - ONSET
ONSET: ON-GOING

## 2020-01-08 NOTE — PROGRESS NOTES
ID Follow-up NOTE  RESIDENT NOTE - reviewed / edited, attending note at bottom    CC:   Low grade fever  Antibiotics: Ceftaroline    Admit Date: 12/29/2019  Hospital Day: 11    Subjective:     Patient remained afebrile overnight and states his pain is improving both surrounding his pacemaker site and right upper arm. Tolerating a diet well. Denies fever, chills, chest pain, shortness of breath, abdominal pain, nausea, vomiting, diarrhea, and constipation. Objective:     Patient Vitals for the past 8 hrs:   BP Temp Temp src Pulse Resp SpO2   01/08/20 1120 (!) 113/56 98.4 °F (36.9 °C) Oral 88 16 98 %   01/08/20 0756 132/67 98.5 °F (36.9 °C) Oral 83 16 95 %   01/08/20 0600 -- -- -- 77 -- --     I/O last 3 completed shifts: In: 530 [P.O.:480; IV Piggyback:50]  Out: 1250 [Urine:1250]  I/O this shift:  In: 250 [P.O.:240; I.V.:10]  Out: 500 [Urine:500]    EXAM:  GENERAL:      No apparent distress. HEENT:           Membranes moist, no oral lesion  NECK:             Supple  LUNGS:           Clear b/l, no rales, no dullness  CARDIAC:       RRR, no murmur appreciated, Chest tender and red surrounding pacemaker implantation site. Dressing in place. ABD:                + BS, soft / NT  EXT:                Broken, tender, and erythematous blisters on right arm.   Worse compared to photo in EPIC  NEURO:          No focal neurologic findings  PSYCH:           Orientation, sensorium, mood normal  LINES:             Peripheral iv       Data Review:  Lab Results   Component Value Date    WBC 6.0 01/08/2020    HGB 11.5 (L) 01/08/2020    HCT 34.0 (L) 01/08/2020    MCV 96.1 01/08/2020     01/08/2020     Lab Results   Component Value Date    CREATININE 1.8 (H) 01/08/2020    BUN 24 (H) 01/08/2020     (L) 01/08/2020    K 5.0 01/08/2020    CL 95 (L) 01/08/2020    CO2 25 01/08/2020       Hepatic Function Panel:   Lab Results   Component Value Date    ALKPHOS 139 04/16/2019    ALT 45 04/16/2019    AST 50 04/16/2019 PROT 8.1 12/29/2019    PROT 7.1 10/16/2012    BILITOT 0.4 04/16/2019    BILIDIR 0.0 10/20/2013    IBILI 0.7 10/20/2013    LABALBU 3.2 01/03/2020       MICRO:  1/6 Resp PCR negative  1/5 UA- No signs of infection, culture no growth  1/5 Blood Culture- No growth to date  12/29 Influenza A / B antigen negative    IMAGING:  Chest XRAY (1/5/20): Normal heart size. No effusion or consolidation. No acute osseous abnormality. Mediastinal contours are unremarkable. Stable left-sided pacemaker. Scheduled Meds:   doxycycline monohydrate  100 mg Oral 2 times per day    polyethylene glycol  17 g Oral Daily    acetaminophen  650 mg Oral Once    sodium chloride flush  10 mL Intravenous 2 times per day    heparin (porcine)  5,000 Units Subcutaneous 3 times per day    nicotine  1 patch Transdermal Daily       Continuous Infusions:      PRN Meds:  hydrOXYzine, diphenhydrAMINE-zinc acetate, acetaminophen, magnesium hydroxide, oxyCODONE-acetaminophen, traMADol, ondansetron, diazepam, sodium chloride flush, ondansetron      Assessment:   Mr. Delmer Gay is a 78year-old-male with a PMHx of hypertenson, and CKD 3 who originally presented with syncope due to complete heart block. ID consult for fever that developed after hospitalization. Cellulitis: surrounding pacemaker placement site and right upper extremity  Patient has tenderness and erythema surrounding the site of his pacemaker placement. He also has increased erythema and tenderness of his right upper extremity from blisters that have burst secondary to an infiltrated IV in December. Plan:     · Continue Ceftaroline 400mg q12 hr while in hospital  · When ready to discharge on 10 additional days of Doxycycline    Discussed with Dr. Cristobal Mckeon, patient, and primary team  Steven Nix MD    Addendum to Resident Progress note:  Pt seen, examined and evaluated.  I have reviewed the current history, physical findings, labs and assessment and plan and agree with note as documented by resident (Dr. Ramirez).     77 yo man with hx HTN, CKD  Admit 9/29 with syncopy, dx CHB, had PM placed on 1/3/20.     Pt developed fever starting on 1/4 (Tm 100.5), 1/5 (Tm 100.9)  - R arm redness and swelling from site of former iv catheter  On exam, area with blister, no induration, no remarkable swelling, no open wound  - PM site with swelling, tenderness     1/7/20 - pt reports that he had chills, followed by sweats last PM (no fever documented in Epic)  Currently, he is tried. No change in L upper chest / PM site discomfort    Today 1/8 - pt feel better.   afebrile > 2 days. R arm improved.   L chest with less swelling     IMP/  Fever, onset in hosp, poss related to arm, poss to PM.  No clear that either is source of fever though pt did have a peripheral iv related phlebitis.  PM site appears more edematous than I would expect yet he is only POD#5.     REC/  Ok for discharge on po doxycycline 100 bid x 10 days  Monitor      Discussed with pt and his wife  Leana Kumar MD

## 2020-01-08 NOTE — CARE COORDINATION
check, or card accepted)     Able to afford home medications/ co-pay costs: Yes    ADLS:  Current PT AM-PAC Score: 18 /24  Current OT AM-PAC Score: 18 /24      DISCHARGE Disposition: Home- No Services Needed    LOC at discharge: Not Applicable  TOYA Completed: Not Indicated    Notification completed in HENS/PAS?:  Not Applicable    IMM Completed:   Yes, Case management has presented and reviewed IMM letter #2 to the patient and/or family/ POA. Patient and/or family/POA verbalized understanding of their medicare rights and appeal process if needed. Patient and/or family/POA has signed, initialed and placed today's date (1.8.20) and time ([id number]) on IMM letter #2 on the the appropriate lines. Patient and/or family/POA, copy of letter offered and they are aware that this original copy of IMM letter #2 is available prior to discharge from the paper chart on the unit. Electronic documentation has been entered into epic for IMM letter #2 and original paper copy has been added to the paper chart at the nurses station.      Transportation:  Transportation PLAN for discharge: family   Mode of Transport: Private Car  Reason for medical transport: Not Applicable  Name of [address] [place] [address], [address]: Not Applicable  Time of Transport: 1630    Transport form completed: Not Indicated    Home Care:  [address] ordered at discharge: Not [address]: Not Applicable  Orders faxed: No    Durable Medical Equipment:  DME Provider: n/a  Equipment obtained during hospitalization: cane has at home    Home Oxygen and Respiratory Equipment:  Oxygen needed at discharge?: Not [address]: Not Applicable  Portable tank available for discharge?: Not Indicated    Dialysis:  Dialysis patient: No    Dialysis Center:  Not Applicable    Hospice Services:  Location: Not Applicable  Agency: Not Applicable    Consents signed: Not Indicated    Referrals made at [facility] for outpatient continued care:  Not Applicable    Additional CM Notes:   Patient is from home alone, plans to go home with sister at discharge. Patient denies any needs for Kerri Mendoza, states his sister is a nurse and can help with any nursing needs. Patient denies any DME needs at well, has cane at home. Pt's sister to transport home at discharge. The Plan for Transition of Care is related to the following treatment goals of Acute renal failure (ARF) ([facility] [address] [N17.9]    The Patient and/or patient representative Odalys Vyas and his family were provided with a choice of provider and agrees with the discharge plan Yes    Freedom of choice list was provided with basic dialogue that supports the patient's individualized plan of care/goals and shares the quality data associated with the providers.  Yes    Care Transitions patient: Yes    Jose Fleming RN  [facility]  Case Management Department  Ph: [phone]  Fax: [phone]

## 2020-01-08 NOTE — DISCHARGE SUMMARY
INTERNAL MEDICINE DEPARTMENT AT [address] [place] [address]  DISCHARGE SUMMARY    Patient ID: Jeff Burnett. Discharge Date: 1/8/2020   Patient's PCP: Pamela Washburn MD                                          Discharge Physician: Charlene Katz MD  Admit Date: 12/29/2019   Admitting Physician: Kian Hobbs MD    PROBLEMS DURING HOSPITALIZATION:  Patient Active Problem List   Diagnosis    Hypertension    Low back pain    Neck pain    Hand pain, right    Insomnia    Neuropathy    Elevated LFTs    DDD (degenerative disc disease), lumbosacral    Acute renal failure (ARF) (HCC)    Pacemaker    Fever    Phlebitis of right arm       DISCHARGE DIAGNOSES:  1- Presyncope 2/2 intermittent complete heart block  2- Complete heart block s/p PM placement   3- Fevers likely 2/2 cellulitis   4- SURY on CKD-3  5- HTN  6- Right shoulder pain 2/2 avascular necrosis       Hospital Course:    Mr. Lion Caballero is a 79 yo M who presented with falls 2/2 presyncope episodes at home. Inpatient work-up revealed intermittent complete heart block. Presyncope 2/2 intermittent CHB- Cardiology consulted, dual chamber pacemaker placed during hospitalization. CXR showed stable lead placement. Will follow-up with cardiology on outpatient basis. Will be discharged with home health for management of wound care and arm sling. Fevers- likely 2/2 suspected cellulitis following PM placement. CXR, UA, and blood cultures all negative. ID consulted, IV antibiotics were initiated and fever resolved. Patient will be discharged with 10d course of doxycycline. SURY on CKD-3- Patient arrived with Cr of 3.3, baseline appears to be ~2.0. Likely pre-renal in etiology. Takes Hyzaar at home. Cr improved with IVFs and holding ARB. Will follow-up with nephro on outpatient basis. Will hold home hyzaar at discharge. HTN- BP stable with SBP in the 120s-130s.  Held Hyzaar during hospitalization, will discontinue at discharge     R shoulder pain- Stable. X-ray concerning for possible avascular necrosis. No orthopedic intervention warranted. Managed conservatively with percocet, tramadol     Physical Exam:  /67   Pulse 83   Temp 98.5 °F (36.9 °C) (Oral)   Resp 16   Ht 5' 11\" (1.803 m)   Wt 158 lb 1.1 oz (71.7 kg)   SpO2 95%   BMI 22.05 kg/m²   General appearance: alert, appears stated age and cooperative  Head: Normocephalic, without obvious abnormality, atraumatic  Eyes: conjunctivae/corneas clear. PERRL, EOM's intact. Fundi benign. Ears: normal TM's and external ear canals both ears  Nose: Nares normal. Septum midline. Mucosa normal. No drainage or sinus tenderness. Throat: lips, mucosa, and tongue normal; teeth and gums normal  Neck: no adenopathy, no carotid bruit, no JVD, supple, symmetrical, trachea midline and thyroid not enlarged, symmetric, no tenderness/mass/nodules  Lungs: clear to auscultation bilaterally  Heart: regular rate and rhythm, S1, S2 normal, no murmur, click, rub or gallop  Abdomen: soft, non-tender; bowel sounds normal; no masses,  no organomegaly  Extremities: extremities normal, atraumatic, no cyanosis or edema  Neurologic: Grossly normal  Skin- PM site tender to palpation. No obvious erythema or swelling noted on exam    Consults: Cardiology, nephrology, ID  Significant Diagnostic Studies: EKG  Treatments: PM placement. IV antibiotics (ceftaroline), IVFs  Disposition: home  Discharged Condition: Stable  Follow Up: Primary Care Physician in one week    DISCHARGE MEDICATION:     Medication List      START taking these medications    doxycycline monohydrate 100 MG capsule  Commonly known as:  MONODOX  Take 1 capsule by mouth 2 times daily for 10 days        CHANGE how you take these medications    losartan-hydrochlorothiazide 100-25 MG per tablet  Commonly known as:  HYZAAR  TAKE 1 TABLET BY MOUTH EVERY DAY  What changed:  Another medication with the same name was removed.

## 2020-01-08 NOTE — PROGRESS NOTES
[facility]    [facility]              [phone]                       Plan :     Stable renal panel at 1.8 creatinine  Good urine output at 1.25 L  Blood pressure stable. Discontinue magnesium supplements  No changes in plan today. Assessment :     Stage III chronic kidney disease since 2010: His creatinine was 1.5 in June 2010 and has gradually worsened to 2.3 as of 2019. Ultrasound shows bilateral 10 cm length. Serum for free light chains with SPEP and UPEP       Acute kidney injury: He arrived with a creatinine of 3.3 from a baseline of 2.3    At home he was taking Hyzaar. He did not receive IV contrast  He has no documented significant hypotension    By history it appears prerenal and improved with IV fluid. Hyperlipidemia: Cholesterol is 138 with an LDL of 33    He has mixed anion gap and non-anion gap metabolic acidosis: It is likely from acute on chronic kidney disease and use of normal saline which is causing hyperchloremic metabolic acidosis. Echocardiogram showed EF of 55 to 60%. He has mild LVH. [facility] would like to thank Garcia Palomino MD   for opportunity to serve this patient      Please call with questions at-   24 Hrs Answering service [phone] or  7 am- 5 pm via Perfect serve or cell phone  Naima Wiggins          CC/reason for consult :     Acute kidney injury and acidosis     HPI :     Bethel Soriano is a 78 y.o. male presented to   the hospital on 12/29/2019 with recurrent syncope    He has past medical history of hypertension, low back pain and neck fracture he presented after syncopal episode. Will describe that his legs gave way and he went down on his knees. He did not lose consciousness. He denies chest pain, palpitations, cough. He does have postnasal drip and has nausea vomiting for last 3 to 4 days. As per the patient he passed out 2-3 times in the last 1 week.   He denies any lightheadedness, seizure-like episode or dizziness. He is placed on IV fluid. Hyzaar is discontinued. He arrived with a creatinine of 3.3 which is down to 2.2. BUN was 60 and is improved to 43. Bicarbonate has worsened to 18. His baseline creatinine is around 1.9-2. I was called for further management of acute kidney injury on chronic kidney disease and metabolic acidosis     Interval History:     Blood pressure is better  Is status post permanent pacemaker placement. .    ROS:     Seen with-with resident    positives in bold   Constitutional:  fever, chills, weakness, weight change, fatigue  Skin:  rash, pruritus, hair loss, bruising, dry skin, petechiae  Head, Face, Neck   headaches, swelling,  cervical adenopathy  Respiratory: shortness of breath, cough, or wheezing  Cardiovascular: chest pain, palpitations, dizzy, edema  Gastrointestinal: nausea, vomiting, diarrhea, constipation,belly pain    Yellow skin, blood in stool  Musculoskeletal:  back pain, muscle weakness, gait problems,       joint pain or swelling. Genitourinary:  dysuria, poor urine flow, flank pain, blood in urine  Neurologic:  vertigo, TIA'S, syncope, seizures, focal weakness  Psychosocial:  insomnia, anxiety, or depression. All other remaining systems are negative or unable to obtain        PMH/PSH/SH/Family History:     Past Medical History:   Diagnosis Date    Hypertension 5/18/2010    Low back pain 5/18/2010    Neck fracture [facility])        Past Surgical History:   Procedure Laterality Date    FRACTURE SURGERY      HIP SURGERY      left    NECK SURGERY      TOTAL HIP ARTHROPLASTY      right        reports that he has been smoking cigarettes. He has a 7.50 pack-year smoking history. He has never used smokeless tobacco. He reports that he does not drink alcohol or use drugs. family history includes Cancer in his father and mother; High Blood Pressure in his father.          Medication:     Current Facility-Administered Medications: doxycycline monohydrate (MONODOX) capsule 100 mg, 100 mg, Oral, 2 times per day  magnesium oxide (MAG-OX) tablet 400 mg, 400 mg, Oral, BID  hydrOXYzine (ATARAX) tablet 10 mg, 10 mg, Oral, TID PRN  diphenhydrAMINE-zinc acetate cream, , Topical, TID PRN  acetaminophen (TYLENOL) tablet 650 mg, 650 mg, Oral, Q4H PRN  magnesium hydroxide (MILK OF MAGNESIA) 400 MG/5ML suspension 30 mL, 30 mL, Oral, Daily PRN  oxyCODONE-acetaminophen (PERCOCET) 5-325 MG per tablet 2 tablet, 2 tablet, Oral, Q6H PRN  traMADol (ULTRAM) tablet 50 mg, 50 mg, Oral, Q6H PRN  ondansetron (ZOFRAN-ODT) disintegrating tablet 4 mg, 4 mg, Oral, Q6H PRN  polyethylene glycol (GLYCOLAX) packet 17 g, 17 g, Oral, Daily  diazepam (VALIUM) tablet 10 mg, 10 mg, Oral, Q12H PRN  acetaminophen (TYLENOL) tablet 650 mg, 650 mg, Oral, Once  sodium chloride flush 0.9 % injection 10 mL, 10 mL, Intravenous, 2 times per day  sodium chloride flush 0.9 % injection 10 mL, 10 mL, Intravenous, PRN  ondansetron (ZOFRAN) injection 4 mg, 4 mg, Intravenous, Q6H PRN  heparin (porcine) injection 5,000 Units, 5,000 Units, Subcutaneous, 3 times per day  nicotine (NICODERM CQ) 21 MG/24HR 1 patch, 1 patch, Transdermal, Daily       Vitals :     Vitals:    01/08/20 0756   BP: 132/67   Pulse: 83   Resp: 16   Temp: 98.5 °F (36.9 °C)   SpO2: 95%       I & O :       Intake/Output Summary (Last 24 hours) at 1/8/2020 0902  Last data filed at 1/8/2020 0600  Gross per 24 hour   Intake 530 ml   Output 1150 ml   Net -620 ml        Physical Examination :     General appearance: Anxious- no, distressed- no, in good spirits- yes    HEENT: Lips- normal, teeth- ok , oral mucosa- moist  Neck : Mass- no, appears symmetrical, JVD- not visible  Respiratory: Respiratory effort-okay, wheeze- no, crackles -none  Cardiovascular:  Ausculation- No M/R/G, Edema right upper extremities edema due to infiltration  Abdomen: visible mass- no, distention- no, scar- no, tenderness- no hepatosplenomegaly-  no  Musculoskeletal:  clubbing no,cyanosis- no , digital ischemia- no                           muscle strength- grossly normal , tone - grossly normal  Skin: rashes- no , ulcers- no, induration- no, tightening - no  Psychiatric:  Judgement and insight- normal           AAO X 3       LABS:     Recent Labs     01/06/20  0458 01/07/20  0523 01/08/20  0518   WBC 6.8 6.4 6.0   HGB 11.5* 11.2* 11.5*   HCT 34.2* 33.9* 34.0*    182 158     Recent Labs     01/06/20  0458 01/07/20  0523 01/08/20  0746   * 135* 134*   K 4.1 4.5 5.0   CL 96* 96* 95*   CO2 27 23 25   BUN 25* 22* 24*   CREATININE 1.8* 1.6* 1.8*   GLUCOSE 127* 113* 96   MG  --  1.80 1.80

## 2020-01-08 NOTE — PROGRESS NOTES
CLINICAL PHARMACY NOTE: MEDS TO [address] Select Patient?: Yes  Total # of Prescriptions Filled: 1   The following medications were delivered to the patient:  · Doxycycline   Total # of Interventions Completed: 0  Time Spent (min): 15    Additional Documentation:

## 2020-01-08 NOTE — PROGRESS NOTES
Pt discharged to home as this time. IV and tele removed without incident. Pt and sister and RN reviewed AVS. Pt and sister verbalized understanding of all D/C instructions, follow up appointments and all medications. Pt left with all belongings, copy of avs and new prescriptions filled via meds to beds. Pt taken down in wheelchair per PCA. Pt left with out incident.

## 2020-01-08 NOTE — PROGRESS NOTES
Electrophysiology - PROGRESS NOTE    Admit Date: 12/29/2019     Chief Complaint: Syncope, fall     Interval History:   Patient seen and examined and notes reviewed. Patient is being followed for syncope and frequent falls, noted to intermittent CHB on tele, s/p dual chamber PPM 1/3/19. Prn pain med not controlling pain - percocet added prn, c/o pain with minimal touch. No temp overnight. ID consulted. CXR showed stable lead placement. Seen by ID and placed on ATB. Remains with L chest incisional discomfort and R arm pain.      In: 170 [P.O.:120]  Out: 650    Wt Readings from Last 2 Encounters:   12/30/19 158 lb 1.1 oz (71.7 kg)   04/16/19 160 lb (72.6 kg)       Data:   Scheduled Meds:   Scheduled Meds:   doxycycline monohydrate  100 mg Oral 2 times per day    magnesium oxide  400 mg Oral Daily    polyethylene glycol  17 g Oral Daily    acetaminophen  650 mg Oral Once    sodium chloride flush  10 mL Intravenous 2 times per day    heparin (porcine)  5,000 Units Subcutaneous 3 times per day    nicotine  1 patch Transdermal Daily     Continuous Infusions:  PRN Meds:.hydrOXYzine, diphenhydrAMINE-zinc acetate, acetaminophen, magnesium hydroxide, oxyCODONE-acetaminophen, traMADol, ondansetron, diazepam, sodium chloride flush, ondansetron  Continuous Infusions:    Intake/Output Summary (Last 24 hours) at 1/8/2020 0850  Last data filed at 1/8/2020 0600  Gross per 24 hour   Intake 530 ml   Output 1150 ml   Net -620 ml       CBC:   Lab Results   Component Value Date    WBC 6.0 01/08/2020    HGB 11.5 01/08/2020     01/08/2020     BMP:  Lab Results   Component Value Date     01/08/2020    K 5.0 01/08/2020    K 3.5 12/30/2019    CL 95 01/08/2020    CO2 25 01/08/2020    BUN 24 01/08/2020    CREATININE 1.8 01/08/2020    GLUCOSE 96 01/08/2020     INR: No results found for: INR     CARDIAC LABS  ENZYMES:No results for input(s): CKMB, CKMBINDEX, TROPONINI in the last 72 hours. Invalid input(s): CKTOTAL;3  FASTING LIPID PANEL:  Lab Results   Component Value Date    HDL 32 04/16/2019    HDL 33 08/25/2011    LDLDIRECT 33 04/16/2019    LDLCALC see below 04/16/2019    TRIG 497 04/16/2019     LIVER PROFILE:  Lab Results   Component Value Date    AST 50 04/16/2019    AST <5 06/28/2018    ALT 45 04/16/2019    ALT <5 06/28/2018       -----------------------------------------------------------------  Telemetry: Personally reviewed  NSR,     Objective:   Vitals: /67   Pulse 83   Temp 98.5 °F (36.9 °C) (Oral)   Resp 16   Ht 5' 11\" (1.803 m)   Wt 158 lb 1.1 oz (71.7 kg)   SpO2 95%   BMI 22.05 kg/m²   General appearance: alert, appears stated age and cooperative, No acute distress   Eyes: Conjunctiva and pupils normal and reactive  Skin: Skin color, texture, turgor normal. No rashes or ecchymosis. L chest incision CDI, erythema vs ecchymosis surrounding incision - appears to be improving  Neck: no JVD, supple, symmetrical, trachea midline   Lungs: , no accessory muscle use, no respiratory distress  Heart: RRR  Abdomen: soft, non-tender; bowel sounds normal  Extremities: No edema, DP +  Psychiatric: normal insight and affect    Patient Active Problem List:     Hypertension     Low back pain     Neck pain     Hand pain, right     Insomnia     Neuropathy     Elevated LFTs     DDD (degenerative disc disease), lumbosacral     Acute renal failure (ARF) (HCC)     Pacemaker        Assessment & Plan:      1. Frequent falls  2. Syncope  3. Intermittent CHB  4. Symptomatic bradycardia  5. PPM  6. Low grade temp    77 y/o man with a h/o HTN, recurrent falls and syncope, p/w a fall, noted to have intermittent CHB on tele, s/p dual chamber MDT PPM (1/3/19, Dr. Alyson Escamilla).      CHB  - S/p dual chamber MDT PPM  - Device check showed stable parameters  - L chest incision CDI, some erythema vs ecchymosis present - improving  - Chest Xray showed stable lead placement  - Wound care and

## 2020-01-08 NOTE — PROGRESS NOTES
Occupational Therapy  Facility/Department: [place] [address] [facility]  Daily Treatment Note  NAME: Satinder Guerrero. : 1950  MRN: [id number]    Date of Service: 2020    Discharge Recommendations:  Satinder Guerrero. scored a 18/24 on the AM-PAC ADL Inpatient form. Current research shows that an AM-PAC score of 18 or greater is typically associated with a discharge to the patient's home setting. Based on the patients AM-PAC score and their current ADL deficits, it is recommended that the patient have 2-3 sessions per week of Occupational Therapy at d/c to increase the patients independence. S Level 1  OT Equipment Recommendations  Equipment Needed: No    Assessment   Performance deficits / Impairments: Decreased functional mobility ; Decreased ADL status; Decreased endurance  Assessment: Pt underwent pacemaker insertion 1/3. Pt expressed  increased pain in L UE, requiring assist for ADL tasks. Pt completed a toilet transfer with sba, brushed teeth and wiped face with set up needed due to limited use of LUE due to increased pain. Pt ambulated hallway with sling and supervision. Pt required increased time due to lower back pain and pain at pacemaker site. Anticipate pt will progress well with increased activity this admission. Pt states plan to d/c with his sister and 24hr Assist. Recommend continued OT tx. Treatment Diagnosis: decline in ADL independence and decreased functional mobility due to increased pain   Prognosis: Good  OT Education: Precautions; ADL Adaptive Strategies  Patient Education: Pt stated understanding of pacemaker precautians and demonstrated proper technique for UB dressing with long sleeve shirt  REQUIRES OT FOLLOW UP: Yes  Activity Tolerance  Activity Tolerance: Patient limited by pain  Safety Devices  Safety Devices in place: Yes  Type of devices: Call light within reach;Nurse notified; Left in chair;Chair alarm in place         Patient Diagnosis(es): The encounter diagnosis was Syncope and

## 2020-01-08 NOTE — PROGRESS NOTES
Physical Therapy  No treatment    Approached pt for PT tx. Pt found in room with OT, just returned from walking in [place] and to bathroom. Pt reports fatigue and increased pain. Requests rest break before PT session. Will return later this date as schedule allows; otherwise, will cont per POC. Addendum: Attempted PT tx later in AM.  Pt appears tired. States he received percocet earlier which makes him feel \"woozy\". Does not feel up for ambulation. Will cont per POC.   Dianna Calles, PT, DPT  [phone]

## 2020-01-09 ENCOUNTER — TELEPHONE (OUTPATIENT)
Dept: INTERNAL MEDICINE CLINIC | Age: 70
End: 2020-01-09

## 2020-01-09 LAB
BLOOD CULTURE, ROUTINE: NORMAL
CULTURE, BLOOD 2: NORMAL

## 2020-01-09 NOTE — TELEPHONE ENCOUNTER
[facility] Trans[facility] Initial Follow Up Call    Outreach made within 2 business days of discharge: Yes    Patient: Kristie Gibbs. Patient : 1950   MRN: <[id number]>  Reason for Admission: There are no discharge diagnoses documented for the most recent discharge. Discharge Date: 20       Spoke with: Jeremiah Pineda    Discharge department/facility: [facility] Patient Contact:  Was patient able to fill all prescriptions: Yes  Was patient instructed to bring all medications to the follow-up visit: Yes  Is patient taking all medications as directed in the discharge summary?  Yes  Does patient understand their discharge instructions: Yes  Does patient have questions or concerns that need addressed prior to 7-14 day follow up office visit: no    Scheduled appointment with PCP within 7-14 days    Follow Up  Future Appointments   Date Time Provider Klarissa Ambriz   2020  1:00 PM Beatriz Islas [facility]   2020  1:30 PM Marilee Feast, APRN - CNP [facility] Card [facility]   2020  1:30 PM SCHEDULE, Beatriz Fabian [facility]   2020  2:00 PM Marilee Feast, APRN - CNP [facility] Card [facility]   2020  3:00 PM SCHEDULE, Beatriz Fabian [facility]   2020  3:15 PM MD Florencio Chandler [facility]   7/15/2020  7:00 AM SCHEDULE, [address] Card ALINA Martell, [address] [place] [address] [place]

## 2020-01-13 ENCOUNTER — TELEPHONE (OUTPATIENT)
Dept: CARDIOLOGY CLINIC | Age: 70
End: 2020-01-13

## 2020-01-16 ENCOUNTER — OFFICE VISIT (OUTPATIENT)
Dept: CARDIOLOGY CLINIC | Age: 70
End: 2020-01-16
Payer: MEDICARE

## 2020-01-16 VITALS
HEART RATE: 99 BPM | SYSTOLIC BLOOD PRESSURE: 95 MMHG | BODY MASS INDEX: 21.65 KG/M2 | WEIGHT: 155.2 LBS | DIASTOLIC BLOOD PRESSURE: 60 MMHG

## 2020-01-16 PROCEDURE — 4004F PT TOBACCO SCREEN RCVD TLK: CPT | Performed by: NURSE PRACTITIONER

## 2020-01-16 PROCEDURE — 1111F DSCHRG MED/CURRENT MED MERGE: CPT | Performed by: NURSE PRACTITIONER

## 2020-01-16 PROCEDURE — 99214 OFFICE O/P EST MOD 30 MIN: CPT | Performed by: NURSE PRACTITIONER

## 2020-01-16 PROCEDURE — 93000 ELECTROCARDIOGRAM COMPLETE: CPT | Performed by: NURSE PRACTITIONER

## 2020-01-16 PROCEDURE — G8484 FLU IMMUNIZE NO ADMIN: HCPCS | Performed by: NURSE PRACTITIONER

## 2020-01-16 PROCEDURE — 1123F ACP DISCUSS/DSCN MKR DOCD: CPT | Performed by: NURSE PRACTITIONER

## 2020-01-16 PROCEDURE — G8427 DOCREV CUR MEDS BY ELIG CLIN: HCPCS | Performed by: NURSE PRACTITIONER

## 2020-01-16 PROCEDURE — 4040F PNEUMOC VAC/ADMIN/RCVD: CPT | Performed by: NURSE PRACTITIONER

## 2020-01-16 PROCEDURE — G8420 CALC BMI NORM PARAMETERS: HCPCS | Performed by: NURSE PRACTITIONER

## 2020-01-16 PROCEDURE — 3017F COLORECTAL CA SCREEN DOC REV: CPT | Performed by: NURSE PRACTITIONER

## 2020-01-16 RX ORDER — NICOTINE 21 MG/24HR
1 PATCH, TRANSDERMAL 24 HOURS TRANSDERMAL EVERY 24 HOURS
Qty: 30 PATCH | Refills: 3 | Status: SHIPPED | OUTPATIENT
Start: 2020-01-16 | End: 2020-01-23 | Stop reason: SDUPTHER

## 2020-01-16 NOTE — PROGRESS NOTES
SURGERY      HIP SURGERY      left    NECK SURGERY      TOTAL HIP ARTHROPLASTY      right       No Known Allergies    Social History:  Reviewed. reports that he has been smoking cigarettes. He has a 7.50 pack-year smoking history. He has never used smokeless tobacco. He reports that he does not drink alcohol or use drugs. Family History:  Reviewed. family history includes Cancer in his father and mother; High Blood Pressure in his father. Review of System:    · Constitutional: No fevers, chills. · Eyes: No visual changes or diplopia. No scleral icterus. · ENT: No Headaches. No mouth sores or sore throat. · Cardiovascular: No for chest pain, No for dyspnea on exertion, No for palpitations or No for loss of consciousness. No cough, hemoptysis, No for pleuritic pain, or phlebitis. · Respiratory: No for cough or wheezing. No hematemesis. · Gastrointestinal: No abdominal pain, blood in stools. · Genitourinary: No dysuria, or hematuria. · Musculoskeletal: No gait disturbance,    · Integumentary: No rash or pruritis. · Neurological: No headache, change in muscle strength, numbness or tingling. · Psychiatric: No anxiety, or depression. · Endocrine: No temperature intolerance. No excessive thirst, fluid intake, or urination. · Hem/Lymph: No abnormal bruising or bleeding, blood clots or swollen lymph nodes. · Allergic/Immunologic: No nasal congestion or hives. Physical Examination:  Vitals:    01/16/20 1323   BP: 95/60   Pulse: 99         Wt Readings from Last 3 Encounters:   01/16/20 155 lb 3.2 oz (70.4 kg)   12/30/19 158 lb 1.1 oz (71.7 kg)   04/16/19 160 lb (72.6 kg)       · Constitutional: Oriented. No distress. · Head: Normocephalic and atraumatic. · Mouth/Throat: Oropharynx is clear and moist.   · Eyes: Conjunctivae clear without jaunduice. PERRL. · Neck: Neck supple. No rigidity. No JVD present. · Cardiovascular: Normal rate, regular rhythm, S1&S2.     · Pulmonary/Chest: Bilateral respiratory sounds. No wheezes, No rhonchi. · Abdominal: Soft. Bowel sounds present. No distension, No tenderness. · Musculoskeletal: No tenderness. No edema    · Lymphadenopathy: Has no cervical adenopathy. · Neurological: Alert and oriented. Cranial nerve appears intact, No Gross deficit   · Skin: Skin is warm and dry. No rash noted. L chest incision healed well, small amt ecchymosis and edema, no erythema or drg, R arm is healing well from IV infiltrate  · Psychiatric: Has a normal mood, affect and behavior     Labs:  Reviewed. No results for input(s): NA, K, CL, CO2, PHOS, BUN, CREATININE in the last 72 hours. Invalid input(s): CA,  TSH  No results for input(s): WBC, HGB, HCT, MCV, PLT in the last 72 hours. Lab Results   Component Value Date    CKTOTAL 77 10/20/2013    TROPONINI <0.01 12/29/2019     No results found for: BNP  No results found for: PROTIME, INR  Lab Results   Component Value Date    CHOL 131 04/16/2019    HDL 32 04/16/2019    HDL 33 08/25/2011    TRIG 497 04/16/2019       ECG: Personally reviewed: NSR, HR 99, , QRS 88, QTc 410    ECHO:  1/2/20  Conclusions    Summary   Left ventricular cavity size is normal with mild concentric left ventricular   hypertrophy. Left ventricular function is normal with ejection fraction estimated at   55-60%. Aortic valve appears slightly thickened/calcified but opens adequately. Mild tricuspid regurgitation. Estimated pulmonary artery systolic pressure is at 32 mmHg assuming a right   atrial pressure of 3 mmHg.     Stress Test: n/a    Cardiac Angiography: n/a    Problem List:   Patient Active Problem List    Diagnosis Date Noted    Fever 01/06/2020    Phlebitis of right arm 01/06/2020    Pacemaker 01/03/2020    Acute renal failure (ARF) ([facility] [address] 12/29/2019    DDD (degenerative disc disease), lumbosacral 10/10/2013    Elevated LFTs 10/16/2012    Neck pain 07/16/2012    Hand pain, right 07/16/2012    Insomnia 07/16/2012   

## 2020-01-16 NOTE — TELEPHONE ENCOUNTER
Pt was seen today. Yusuf Moore (sister)Would like a call regarding her Brother's Newly put pacemaker. Would like more information  about the home monitor system they were suppose to receive but still has not received. Needing a little more insight on how to get the machine and what the next steps are. Please advise.  A number where she can be reached is [phone]

## 2020-01-16 NOTE — TELEPHONE ENCOUNTER
Called and spoke with sister. Rx sent in. Rosa had called [employer] and remote will be mailed within 7-10 days.

## 2020-01-23 ENCOUNTER — OFFICE VISIT (OUTPATIENT)
Dept: INTERNAL MEDICINE CLINIC | Age: 70
End: 2020-01-23
Payer: MEDICARE

## 2020-01-23 VITALS
TEMPERATURE: 98.6 F | WEIGHT: 155.6 LBS | DIASTOLIC BLOOD PRESSURE: 68 MMHG | HEART RATE: 80 BPM | HEIGHT: 71 IN | SYSTOLIC BLOOD PRESSURE: 120 MMHG | BODY MASS INDEX: 21.78 KG/M2

## 2020-01-23 PROCEDURE — 90662 IIV NO PRSV INCREASED AG IM: CPT | Performed by: INTERNAL MEDICINE

## 2020-01-23 PROCEDURE — 99214 OFFICE O/P EST MOD 30 MIN: CPT | Performed by: INTERNAL MEDICINE

## 2020-01-23 PROCEDURE — G0008 ADMIN INFLUENZA VIRUS VAC: HCPCS | Performed by: INTERNAL MEDICINE

## 2020-01-23 RX ORDER — KETOROLAC TROMETHAMINE 30 MG/ML
30 INJECTION, SOLUTION INTRAMUSCULAR; INTRAVENOUS ONCE
Status: COMPLETED | OUTPATIENT
Start: 2020-01-23 | End: 2020-01-23

## 2020-01-23 RX ORDER — DIAZEPAM 10 MG/1
TABLET ORAL
Qty: 60 TABLET | Refills: 5 | Status: SHIPPED | OUTPATIENT
Start: 2020-01-23 | End: 2020-08-07 | Stop reason: SDUPTHER

## 2020-01-23 RX ORDER — CYANOCOBALAMIN 1000 UG/ML
1000 INJECTION INTRAMUSCULAR; SUBCUTANEOUS ONCE
Status: COMPLETED | OUTPATIENT
Start: 2020-01-23 | End: 2020-01-23

## 2020-01-23 RX ORDER — NICOTINE 21 MG/24HR
1 PATCH, TRANSDERMAL 24 HOURS TRANSDERMAL EVERY 24 HOURS
Qty: 30 PATCH | Refills: 3 | Status: SHIPPED | OUTPATIENT
Start: 2020-01-23 | End: 2020-10-02 | Stop reason: SDUPTHER

## 2020-01-23 RX ADMIN — KETOROLAC TROMETHAMINE 30 MG: 30 INJECTION, SOLUTION INTRAMUSCULAR; INTRAVENOUS at 16:14

## 2020-01-23 RX ADMIN — CYANOCOBALAMIN 1000 MCG: 1000 INJECTION INTRAMUSCULAR; SUBCUTANEOUS at 16:12

## 2020-01-23 ASSESSMENT — PATIENT HEALTH QUESTIONNAIRE - PHQ9
SUM OF ALL RESPONSES TO PHQ9 QUESTIONS 1 & 2: 2
1. LITTLE INTEREST OR PLEASURE IN DOING THINGS: 2
2. FEELING DOWN, DEPRESSED OR HOPELESS: 0
SUM OF ALL RESPONSES TO PHQ QUESTIONS 1-9: 2
SUM OF ALL RESPONSES TO PHQ QUESTIONS 1-9: 2

## 2020-01-23 ASSESSMENT — ENCOUNTER SYMPTOMS
EYES NEGATIVE: 1
RESPIRATORY NEGATIVE: 1
GASTROINTESTINAL NEGATIVE: 1

## 2020-01-23 NOTE — PROGRESS NOTES
Subjective:      Patient ID: Yvette Mike is a 78 y.o. male. Hypertension   This is a chronic problem. The problem is unchanged. The problem is controlled. Associated symptoms include anxiety. There are no associated agents to hypertension. Risk factors for coronary artery disease include family history and sedentary lifestyle. Past treatments include lifestyle changes, calcium channel blockers and angiotensin blockers. The current treatment provides significant improvement. Hand Pain    The incident occurred at home. There was no injury mechanism. The pain is present in the right hand. The quality of the pain is described as aching. The pain is at a severity of 4/10. The pain is moderate. The pain has been constant since the incident. Associated symptoms include numbness and tingling. He has tried acetaminophen and NSAIDs for the symptoms. Review of Systems   Constitutional: Negative. HENT: Negative. Eyes: Negative. Respiratory: Negative. Cardiovascular: Negative. Gastrointestinal: Negative. Genitourinary: Negative. Musculoskeletal: Negative. Skin: Negative. Neurological: Positive for tingling and numbness. Psychiatric/Behavioral: Negative. Objective:   Physical Exam  Constitutional:       Appearance: He is well-developed. HENT:      Head: Normocephalic and atraumatic. Left Ear: External ear normal.   Eyes:      Conjunctiva/sclera: Conjunctivae normal.      Pupils: Pupils are equal, round, and reactive to light. Neck:      Musculoskeletal: Normal range of motion and neck supple. Thyroid: No thyromegaly. Cardiovascular:      Rate and Rhythm: Normal rate and regular rhythm. Heart sounds: Normal heart sounds. No murmur. Pulmonary:      Effort: Pulmonary effort is normal. No respiratory distress. Breath sounds: Normal breath sounds. No wheezing or rales. Abdominal:      General: Bowel sounds are normal.      Palpations: Abdomen is soft. Musculoskeletal: Normal range of motion. Skin:     General: Skin is warm and dry. Neurological:      Mental Status: He is alert and oriented to person, place, and time. Current Outpatient Medications on File Prior to Visit   Medication Sig Dispense Refill    oxyCODONE-acetaminophen (PERCOCET) 5-325 MG per tablet Take 1 tablet by mouth 3 times daily as needed for Pain . No current facility-administered medications on file prior to visit.           Assessment:      Hypertension  Stable    General anxiety     Pacemaker insertion     Chronic hand pain      Plan:     continue meds    Refills     Flu vac    toradol 30mg IM    Return in 3 months        C Jamari Walker MD

## 2020-01-23 NOTE — PROGRESS NOTES
Vaccine Information Sheet, \"Influenza - Inactivated\"  given to Skippy Erm., or parent/legal guardian of  Skippy Erm. and verbalized understanding. Patient responses:    Have you ever had a reaction to a flu vaccine? NODo you have any current illness? NO  Have you ever had Guillian Chebanse Syndrome? NO  Do you have a serious allergy to any of the follow: Neomycin, Polymyxin, Thimerosal, eggs or egg products? NO    Flu vaccine given per order. Please see immunization tab. Risks and benefits explained. Current VIS given.       Immunizations Administered     Name Date Dose Route    Influenza, High Dose (Fluzone 65 yrs and older) 1/23/2020 0.5 mL Intramuscular    Site: Deltoid- Right    Lot: [id number]

## 2020-01-26 NOTE — PROGRESS NOTES
Initial set up of carelink completed. Carelink transmission shows normal sensing and pacing function. See interrogation for more details. AP 0.1%,  0.3%. Episodes Since: 16-Jan-2020  10 SVT-longest 2.5 min. Rates 154-158 bpm. Med rec does not show he is on any cardiac meds. OV NPFW 2/18. Follow up as scheduled. See PACEART report under Cardiology tab.

## 2020-01-27 ENCOUNTER — NURSE ONLY (OUTPATIENT)
Dept: CARDIOLOGY CLINIC | Age: 70
End: 2020-01-27

## 2020-02-14 DIAGNOSIS — E87.5 HYPERKALEMIA: ICD-10-CM

## 2020-02-14 DIAGNOSIS — E83.42 HYPOMAGNESEMIA: ICD-10-CM

## 2020-02-14 LAB
ANION GAP SERPL CALCULATED.3IONS-SCNC: 15 MMOL/L (ref 3–16)
BUN BLDV-MCNC: 21 MG/DL (ref 7–20)
CALCIUM SERPL-MCNC: 9.5 MG/DL (ref 8.3–10.6)
CHLORIDE BLD-SCNC: 103 MMOL/L (ref 99–110)
CO2: 21 MMOL/L (ref 21–32)
CREAT SERPL-MCNC: 1.5 MG/DL (ref 0.8–1.3)
GFR AFRICAN AMERICAN: 56
GFR NON-AFRICAN AMERICAN: 46
GLUCOSE BLD-MCNC: 85 MG/DL (ref 70–99)
POTASSIUM SERPL-SCNC: 4.4 MMOL/L (ref 3.5–5.1)
SODIUM BLD-SCNC: 139 MMOL/L (ref 136–145)

## 2020-03-02 NOTE — PROGRESS NOTES
[address]   Electrophysiology  Office Visit  Date: 3/4/2020    Chief Complaint   Patient presents with    Bradycardia    Tachycardia    Loss of Consciousness       Cardiac HX: Maryann Ochoa is a 69 y.o. man with a h/o HTN, recurrent falls and syncope, p/w a fall, noted to have intermittent CHB on tele, s/p dual chamber MDT PPM (1/3/19, Dr. Parrish Arango). Interval History/HPI: Patient is here for f/u.  in the office today, has had several episodes of tach which appears to be a ST on device with rates in the 150 range. Patient has been asymptomatic. He is eating and drinking well. L chest incision has healed well. He denies CP or SOB. His main c/o today is that he has L shoulder pain and low back pain. He is going to f/u with his PCP and pain doctor for that. He has had not further syncopal events since PPM placed. Home medications:   Current Outpatient Medications on File Prior to Visit   Medication Sig Dispense Refill    nicotine (NICODERM CQ) 21 MG/24HR Place 1 patch onto the skin every 24 hours 30 patch 3    diazepam (VALIUM) 10 MG tablet TAKE 1 TABLET BY MOUTH EVERY TWELVE HOURS AS NEEDED FOR ANXIETY FOR UP TO 30 DAYS 60 tablet 5    oxyCODONE-acetaminophen (PERCOCET) 5-325 MG per tablet Take 1 tablet by mouth 3 times daily as needed for Pain . No current facility-administered medications on file prior to visit. Past Medical History:   Diagnosis Date    Hypertension 5/18/2010    Low back pain 5/18/2010    Neck fracture [facility])         Past Surgical History:   Procedure Laterality Date    FRACTURE SURGERY      HIP SURGERY      left    NECK SURGERY      PACEMAKER PLACEMENT      TOTAL HIP ARTHROPLASTY      right       No Known Allergies    Social History:  Reviewed. reports that he quit smoking about 2 months ago. His smoking use included cigarettes. He has a 7.50 pack-year smoking history.  He has never used smokeless tobacco. He reports that he does not drink alcohol or syncope, p/w a fall, noted to have intermittent CHB on tele, s/p dual chamber MDT PPM (1/3/19, Dr. Kt Wong).      CHB/TBS  - S/p dual chamber MDT PPM  - Device check shows <0.1% AP, 0.2%/, 15 tachycardic episodes   - Will add low dose BB - Toprol 25 mg QD - reassess when he f/u with Dr. Kt Wong  - L chest incision has healed  - Activity restrictions reviewed with patient   - Reviewed recent BMP and Mg  - F/u Dr. Kt Wong in April  - ECG ordered and results personally reviewed     HTBN  - BP on the high side today  - Will add low dose BB  - Has been hypotensive recently     EF of 30-15%  No systolic HF  No known CAD  No known AF   No Tobacco use. All questions and concerns were addressed to the patient/family. Alternatives to my treatment were discussed. The note was completed using EMR. Every effort was made to ensure accuracy; however, inadvertent computerized transcription errors may be present. Patient received education regarding their diagnosis, treatment and medications while in the office today.       Yee Mcdaniels [address]

## 2020-03-04 ENCOUNTER — NURSE ONLY (OUTPATIENT)
Dept: CARDIOLOGY CLINIC | Age: 70
End: 2020-03-04
Payer: MEDICARE

## 2020-03-04 ENCOUNTER — OFFICE VISIT (OUTPATIENT)
Dept: CARDIOLOGY CLINIC | Age: 70
End: 2020-03-04
Payer: MEDICARE

## 2020-03-04 VITALS
HEART RATE: 104 BPM | SYSTOLIC BLOOD PRESSURE: 132 MMHG | HEIGHT: 71 IN | DIASTOLIC BLOOD PRESSURE: 84 MMHG | BODY MASS INDEX: 22.18 KG/M2 | WEIGHT: 158.4 LBS

## 2020-03-04 PROCEDURE — 93000 ELECTROCARDIOGRAM COMPLETE: CPT | Performed by: NURSE PRACTITIONER

## 2020-03-04 PROCEDURE — G8420 CALC BMI NORM PARAMETERS: HCPCS | Performed by: NURSE PRACTITIONER

## 2020-03-04 PROCEDURE — 3017F COLORECTAL CA SCREEN DOC REV: CPT | Performed by: NURSE PRACTITIONER

## 2020-03-04 PROCEDURE — 93280 PM DEVICE PROGR EVAL DUAL: CPT | Performed by: INTERNAL MEDICINE

## 2020-03-04 PROCEDURE — G8482 FLU IMMUNIZE ORDER/ADMIN: HCPCS | Performed by: NURSE PRACTITIONER

## 2020-03-04 PROCEDURE — 1123F ACP DISCUSS/DSCN MKR DOCD: CPT | Performed by: NURSE PRACTITIONER

## 2020-03-04 PROCEDURE — G8427 DOCREV CUR MEDS BY ELIG CLIN: HCPCS | Performed by: NURSE PRACTITIONER

## 2020-03-04 PROCEDURE — 99214 OFFICE O/P EST MOD 30 MIN: CPT | Performed by: NURSE PRACTITIONER

## 2020-03-04 PROCEDURE — 1036F TOBACCO NON-USER: CPT | Performed by: NURSE PRACTITIONER

## 2020-03-04 PROCEDURE — 4040F PNEUMOC VAC/ADMIN/RCVD: CPT | Performed by: NURSE PRACTITIONER

## 2020-03-04 RX ORDER — METOPROLOL SUCCINATE 25 MG/1
25 TABLET, EXTENDED RELEASE ORAL DAILY
Qty: 30 TABLET | Refills: 5 | Status: SHIPPED | OUTPATIENT
Start: 2020-03-04 | End: 2020-04-16 | Stop reason: SDUPTHER

## 2020-03-05 PROBLEM — R00.1 BRADYCARDIA: Status: ACTIVE | Noted: 2020-03-05

## 2020-03-05 NOTE — PROGRESS NOTES
Interrogation and programming evaluation of the device shows normal function, with stable sensing and pacing thresholds. See interrogation for details. The pt saw Sheree Ruby NP today. Recheck 3 mos.

## 2020-04-15 NOTE — PROGRESS NOTES
Cardiac Electrophysiology Consultation  Via  Telehealth Evaluation - Audio/Visual (during SD-11 public health emergency)        Date: 2020  Reason for Consultation: CHB  Consult Requesting Physician: No att. providers found     Chief Complaint:   Chief Complaint   Patient presents with    Other     CHB      HPI:    Gopal Wood (:  1950) is a 69 y.o. male who has requested an audio/video evaluation and has a history of CHB. PMH significant for HTN, recurrent falls and syncope. In 2019, pt presented to ED following a fall, noted to have intermittent CHB on telemetry. S/p dual chamber MDT PPM (1/3/20). At f/u OV with NP on 3/2/20, his device recorded 15 tachycardic episodes, so pt was started on Toprol. Interval History: Today, he is being seen for 3 month f/u after PPM.  He is feeling well since implant with improvement in strength and energy. Denies further syncope since PPM.  Remote device interrogation from 4/15/20 shows normally functioning PPM with stable sensing and pacing thresholds.  0.2%, AP 0.4%. No tachycardic episodes. No atrial or ventricular arrhythmias noted. Past Medical History:   Diagnosis Date    Hypertension 2010    Low back pain 2010    Neck fracture [facility])         Past Surgical History:   Procedure Laterality Date    FRACTURE SURGERY      HIP SURGERY      left    NECK SURGERY      PACEMAKER PLACEMENT      TOTAL HIP ARTHROPLASTY      right       Allergies:  No Known Allergies    Medication:   Prior to Admission medications    Medication Sig Start Date End Date Taking?  Authorizing Provider   nicotine (NICODERM CQ) 21 MG/24HR Place 1 patch onto the skin every 24 hours 20 Yes MADELYN Guzman MD   diazepam (VALIUM) 10 MG tablet TAKE 1 TABLET BY MOUTH EVERY TWELVE HOURS AS NEEDED FOR ANXIETY FOR UP TO 30 DAYS 20 Yes MADELYN Guzman MD   oxyCODONE-acetaminophen (PERCOCET) 5-325 MG per tablet Take 1 tablet by mouth 3 times daily as needed for Pain . Yes Historical Provider, MD   metoprolol succinate (TOPROL XL) 25 MG extended release tablet Take 1 tablet by mouth daily  Patient not taking: Reported on 4/16/2020 3/4/20   JACKY Jaquez - CNP       Social History:   reports that he quit smoking about 3 months ago. His smoking use included cigarettes. He has a 7.50 pack-year smoking history. He has never used smokeless tobacco. He reports that he does not drink alcohol or use drugs. Family History:  family history includes Cancer in his father and mother; High Blood Pressure in his father. Reviewed. Denies family history of sudden cardiac death, arrhythmia, premature CAD    Review of System:    · General ROS: negative for - chills, fever   · Psychological ROS: negative for - anxiety or depression  · Ophthalmic ROS: negative for - eye pain or loss of vision  · ENT ROS: negative for - epistaxis, headaches, nasal discharge, sore throat   · Allergy and Immunology ROS: negative for - hives, nasal congestion   · Hematological and Lymphatic ROS: negative for - bleeding problems, blood clots, bruising or jaundice  · Endocrine ROS: negative for - skin changes, temperature intolerance or unexpected weight changes  · Respiratory ROS: negative for - cough, hemoptysis, pleuritic pain, SOB, sputum changes or wheezing  · Cardiovascular ROS: Per HPI.    · Gastrointestinal ROS: negative for - abdominal pain, blood in stools, diarrhea, hematemesis, melena, nausea/vomiting or swallowing difficulty/pain  · Genito-Urinary ROS: negative for - dysuria or incontinence  · Musculoskeletal ROS: negative for - joint swelling or muscle pain  · Neurological ROS: negative for - confusion, dizziness, gait disturbance, headaches, numbness/tingling, seizures, speech problems, tremors, visual changes or weakness  · Dermatological ROS: negative for - rash        PHYSICAL EXAMINATION:  Vital Signs: (As obtained by patient/caregiver or practitioner observation)    Vitals:    04/16/20 1346   BP: (!) 142/82   Temp: 96.6 °F (35.9 °C)       Labs:  Reviewed. ECG: deferred due to virtual visit    Studies:   1. Event monitor:  none    2. Echo 1/2/20:  Summary   Left ventricular cavity size is normal with mild concentric left ventricular   hypertrophy. Left ventricular function is normal with ejection fraction estimated at   55-60%. Aortic valve appears slightly thickened/calcified but opens adequately. Mild tricuspid regurgitation. Estimated pulmonary artery systolic pressure is at 32 mmHg assuming a right   atrial pressure of 3 mmHg. 3. Stress Test:  none    4. Cath: none    The MCOT, echocardiogram, stress test, and coronary angiography/PCI were reviewed by myself and used for my plan of care. Procedures:  1.  none    ASSESSMENT/PLAN:  1.  CHB  2. Syncope  3. S/p dual chamber PPM 1/3/20  4. HTN  5. Tachycardia    No further episodes of syncope since PPM implant  Toprol added for tachycardia seen on device  Device with normal function  No further episodes of tachycardia since starting BB. No arrhythmias on device. Continue Toprol 25 mg daily  Continue with remote home transmissions every 3 months  Follow up in 6 months with device check    Time spent 15 min    Thank you for allowing me to participate in the care of Carolina Monroe . All questions and concerns were addressed to the patient/family. Alternatives to my treatment were discussed. Carolina Monroe is a 69 y.o. male being evaluated by a Virtual Visit (video visit) encounter to address concerns as mentioned above. A caregiver was present when appropriate. Due to this being a TeleHealth encounter ([facility]), evaluation of the following organ systems was limited: Vitals/Constitutional/EENT/Resp/CV/GI//MS/Neuro/Skin/Heme-Lymph-Imm.  Pursuant to the emergency declaration under the [address] [facility], [address]53 waiver

## 2020-04-16 ENCOUNTER — VIRTUAL VISIT (OUTPATIENT)
Dept: CARDIOLOGY CLINIC | Age: 70
End: 2020-04-16
Payer: MEDICARE

## 2020-04-16 ENCOUNTER — NURSE ONLY (OUTPATIENT)
Dept: CARDIOLOGY CLINIC | Age: 70
End: 2020-04-16
Payer: MEDICARE

## 2020-04-16 VITALS
DIASTOLIC BLOOD PRESSURE: 82 MMHG | BODY MASS INDEX: 22.56 KG/M2 | WEIGHT: 159.5 LBS | SYSTOLIC BLOOD PRESSURE: 142 MMHG | TEMPERATURE: 96.6 F

## 2020-04-16 PROCEDURE — 93294 REM INTERROG EVL PM/LDLS PM: CPT | Performed by: INTERNAL MEDICINE

## 2020-04-16 PROCEDURE — 93296 REM INTERROG EVL PM/IDS: CPT | Performed by: INTERNAL MEDICINE

## 2020-04-16 PROCEDURE — 99442 PR PHYS/QHP TELEPHONE EVALUATION 11-20 MIN: CPT | Performed by: INTERNAL MEDICINE

## 2020-04-16 RX ORDER — METOPROLOL SUCCINATE 25 MG/1
25 TABLET, EXTENDED RELEASE ORAL DAILY
Qty: 30 TABLET | Refills: 5 | Status: SHIPPED | OUTPATIENT
Start: 2020-04-16 | End: 2020-10-02 | Stop reason: SDUPTHER

## 2020-04-22 ENCOUNTER — OFFICE VISIT (OUTPATIENT)
Dept: PRIMARY CARE CLINIC | Age: 70
End: 2020-04-22
Payer: MEDICARE

## 2020-04-22 VITALS — HEART RATE: 91 BPM | OXYGEN SATURATION: 95 %

## 2020-04-22 PROCEDURE — 4040F PNEUMOC VAC/ADMIN/RCVD: CPT | Performed by: NURSE PRACTITIONER

## 2020-04-22 PROCEDURE — 1036F TOBACCO NON-USER: CPT | Performed by: NURSE PRACTITIONER

## 2020-04-22 PROCEDURE — 3017F COLORECTAL CA SCREEN DOC REV: CPT | Performed by: NURSE PRACTITIONER

## 2020-04-22 PROCEDURE — G8428 CUR MEDS NOT DOCUMENT: HCPCS | Performed by: NURSE PRACTITIONER

## 2020-04-22 PROCEDURE — 1123F ACP DISCUSS/DSCN MKR DOCD: CPT | Performed by: NURSE PRACTITIONER

## 2020-04-22 PROCEDURE — G8420 CALC BMI NORM PARAMETERS: HCPCS | Performed by: NURSE PRACTITIONER

## 2020-04-22 PROCEDURE — 99213 OFFICE O/P EST LOW 20 MIN: CPT | Performed by: NURSE PRACTITIONER

## 2020-04-22 NOTE — PROGRESS NOTES
FLU/COVID-19 CLINIC EVALUATION  HPI: asymptomatic, no exposure, recent admit for frequent falls  Symptom duration, days:  [] 1   [] 2    []3   [] 4    []5    []6   [] 7    []8    []9   [] 10    []11 []  12   [] 13    []14 or greater    Vitals:    04/22/20 1520   Pulse: 91   SpO2: 95%           Review of Systems   All other systems reviewed and are negative. Symptom course:  []Worsening        []Stable       [] Improving  []Fevers  Symptom (not measured)  Measured (Result: )  []Chills  []Cough  []Coughing up blood  []Chest Congestion  []Nasal Congestion  []Feeling short of breath  []Sometimes  []Frequently  []All the time  []Chest pain  []Headaches  []Tolerable  []Severe  []Sore throat  []Muscle aches  []Nausea  []Vomiting  []Unable to keep fluids down  []Diarrhea  []Severe  OTHER SYMPTOMS:      RISK FACTORS:  []Pregnant or possibly pregnant  []Age over 60  []Diabetes  [x]Heart disease  []Asthma  []COPD/Other chronic lung diseases  []Active Cancer  []On Chemotherapy  []Taking oral steroids  []History Lymphoma/Leukemia  []Close contact with a lab confirmed COVID-19 patient within 14 days of symptom onset  []History of travel from affected geographical areas within 14 days of symptom onset  []Health Care Worker Exposure no symptoms  []Health Care Worker Exposure symptomatic    Assessment :  [x]Alert      [x]Oriented to person/place/time  [x]No apparent distress      []Toxic appearing  [x]Breathing appears normal   []Appears tachypneic      [x]Speaking in full sentence    Physical Exam  Vitals signs reviewed. Constitutional:       Appearance: Normal appearance. HENT:      Head: Normocephalic and atraumatic. Mouth/Throat:      Mouth: Mucous membranes are moist.   Neck:      Musculoskeletal: Normal range of motion. Cardiovascular:      Rate and Rhythm: Normal rate. Pulses: Normal pulses. Pulmonary:      Effort: Pulmonary effort is normal.   Musculoskeletal: Normal range of motion.    Skin:     General:

## 2020-04-24 LAB
SARS-COV-2: NOT DETECTED
SOURCE: NORMAL

## 2020-04-27 ENCOUNTER — TELEPHONE (OUTPATIENT)
Dept: ADMINISTRATIVE | Age: 70
End: 2020-04-27

## 2020-07-16 PROCEDURE — 93296 REM INTERROG EVL PM/IDS: CPT | Performed by: INTERNAL MEDICINE

## 2020-07-16 PROCEDURE — 93294 REM INTERROG EVL PM/LDLS PM: CPT | Performed by: INTERNAL MEDICINE

## 2020-07-17 NOTE — PROGRESS NOTES
Carelink transmission shows normal sensing and pacing function. See interrogation for more details. Episodes Since: 15-Apr-2020  3 SVT, rate 154 bpm, longest 37 sec. Ov 10/20.

## 2020-07-20 ENCOUNTER — NURSE ONLY (OUTPATIENT)
Dept: CARDIOLOGY CLINIC | Age: 70
End: 2020-07-20
Payer: MEDICARE

## 2020-08-07 RX ORDER — DIAZEPAM 10 MG/1
TABLET ORAL
Qty: 60 TABLET | Refills: 5 | Status: SHIPPED | OUTPATIENT
Start: 2020-08-07 | End: 2020-10-02 | Stop reason: SDUPTHER

## 2020-08-31 RX ORDER — DIAZEPAM 10 MG/1
TABLET ORAL
Qty: 60 TABLET | Refills: 0 | Status: SHIPPED | OUTPATIENT
Start: 2020-08-31 | End: 2020-09-01

## 2020-10-02 ENCOUNTER — OFFICE VISIT (OUTPATIENT)
Dept: INTERNAL MEDICINE CLINIC | Age: 70
End: 2020-10-02
Payer: MEDICARE

## 2020-10-02 VITALS
HEART RATE: 99 BPM | SYSTOLIC BLOOD PRESSURE: 147 MMHG | HEIGHT: 71 IN | TEMPERATURE: 97.9 F | OXYGEN SATURATION: 97 % | DIASTOLIC BLOOD PRESSURE: 89 MMHG | WEIGHT: 161 LBS | BODY MASS INDEX: 22.54 KG/M2

## 2020-10-02 PROCEDURE — 99213 OFFICE O/P EST LOW 20 MIN: CPT | Performed by: STUDENT IN AN ORGANIZED HEALTH CARE EDUCATION/TRAINING PROGRAM

## 2020-10-02 RX ORDER — METOPROLOL SUCCINATE 25 MG/1
25 TABLET, EXTENDED RELEASE ORAL DAILY
Qty: 30 TABLET | Refills: 5 | Status: SHIPPED | OUTPATIENT
Start: 2020-10-02 | End: 2021-09-08

## 2020-10-02 RX ORDER — NICOTINE 21 MG/24HR
1 PATCH, TRANSDERMAL 24 HOURS TRANSDERMAL EVERY 24 HOURS
Qty: 30 PATCH | Refills: 3 | Status: SHIPPED | OUTPATIENT
Start: 2020-10-02 | End: 2021-04-28 | Stop reason: ALTCHOICE

## 2020-10-02 RX ORDER — DIAZEPAM 10 MG/1
TABLET ORAL
Qty: 60 TABLET | Refills: 5 | Status: SHIPPED | OUTPATIENT
Start: 2020-10-02 | End: 2021-04-28 | Stop reason: SDUPTHER

## 2020-10-02 ASSESSMENT — ENCOUNTER SYMPTOMS
BACK PAIN: 1
COUGH: 0
ABDOMINAL PAIN: 0
VOMITING: 0
CHEST TIGHTNESS: 0
ABDOMINAL DISTENTION: 0
NAUSEA: 0
SHORTNESS OF BREATH: 0
WHEEZING: 0
STRIDOR: 0
CHOKING: 0
SORE THROAT: 0
APNEA: 0
DIARRHEA: 0
CONSTIPATION: 0
BLOOD IN STOOL: 0

## 2020-10-02 ASSESSMENT — PATIENT HEALTH QUESTIONNAIRE - PHQ9
SUM OF ALL RESPONSES TO PHQ QUESTIONS 1-9: 0
SUM OF ALL RESPONSES TO PHQ QUESTIONS 1-9: 0
1. LITTLE INTEREST OR PLEASURE IN DOING THINGS: 0
2. FEELING DOWN, DEPRESSED OR HOPELESS: 0
SUM OF ALL RESPONSES TO PHQ9 QUESTIONS 1 & 2: 0

## 2020-10-02 NOTE — PROGRESS NOTES
10/2/2020     Pk Arroyo (:  1950) is a 69 y.o. male, here for evaluation of the following medical concerns:      Hypertension   This is a chronic problem. The problem is unchanged. The problem is controlled. Associated symptoms include anxiety. There are no associated agents to hypertension. Risk factors for coronary artery disease include family history, sedentary lifestyle and male gender. Past treatments include lifestyle changes, calcium channel blockers and angiotensin blockers. The current treatment provides significant improvement. Knee Pain    There was no injury mechanism. The pain is present in the left knee. The pain is at a severity of 3/10. The pain is mild. The pain has been constant since onset. Pertinent negatives include no inability to bear weight, loss of motion, loss of sensation, muscle weakness, numbness or tingling. The symptoms are aggravated by weight bearing and movement. He has tried acetaminophen for the symptoms. Review of Systems   Constitutional: Positive for fatigue. Negative for activity change, appetite change, chills, fever and unexpected weight change. HENT: Negative for congestion, ear pain and sore throat. Eyes: Negative for visual disturbance. Respiratory: Negative for apnea, cough, choking, chest tightness, shortness of breath, wheezing and stridor. Cardiovascular: Negative for chest pain, palpitations and leg swelling. Gastrointestinal: Negative for abdominal distention, abdominal pain, blood in stool, constipation, diarrhea, nausea and vomiting. Genitourinary: Negative for dysuria, hematuria and urgency. Musculoskeletal: Positive for arthralgias, back pain and myalgias. Negative for gait problem, joint swelling, neck pain and neck stiffness. Neurological: Negative for dizziness, tremors, seizures, syncope, speech difficulty, light-headedness, numbness and headaches. Psychiatric/Behavioral: Positive for sleep disturbance. Prior to Visit Medications    Medication Sig Taking? Authorizing Provider   diclofenac sodium (VOLTAREN) 1 % GEL Apply 4 g topically 4 times daily Yes Shaniqua Ocampo MD   metoprolol succinate (TOPROL XL) 25 MG extended release tablet Take 1 tablet by mouth daily Yes Shaniqua Ocampo MD   diazePAM (VALIUM) 10 MG tablet TAKE 1 TABLET BY MOUTH EVERY TWELVE HOURS AS NEEDED FOR ANXIETY FOR UP TO 30 DAYS Yes Shaniqua Ocampo MD   nicotine (NICODERM CQ) 21 MG/24HR Place 1 patch onto the skin every 24 hours Yes Shaniqua Ocampo MD   oxyCODONE-acetaminophen (PERCOCET) 5-325 MG per tablet Take 1 tablet by mouth 3 times daily as needed for Pain . Yes Historical Provider, MD        Social History     Tobacco Use    Smoking status: Former Smoker     Packs/day: 0.25     Years: 30.00     Pack years: 7.50     Types: Cigarettes     Last attempt to quit: 2019     Years since quittin.7    Smokeless tobacco: Never Used   Substance Use Topics    Alcohol use: No        Vitals:    10/02/20 1344 10/02/20 1353   BP: (!) 150/91 (!) 147/89   Site: Right Upper Arm Right Upper Arm   Position: Sitting Sitting   Cuff Size: Large Adult Large Adult   Pulse: 99    Temp: 97.9 °F (36.6 °C)    TempSrc: Temporal    SpO2: 97%    Weight: 161 lb (73 kg)    Height: 5' 11\" (1.803 m)      Estimated body mass index is 22.45 kg/m² as calculated from the following:    Height as of this encounter: 5' 11\" (1.803 m). Weight as of this encounter: 161 lb (73 kg). Physical Exam  Constitutional:       General: He is not in acute distress. Appearance: He is well-developed. HENT:      Head: Normocephalic and atraumatic. Eyes:      Pupils: Pupils are equal, round, and reactive to light. Neck:      Musculoskeletal: Normal range of motion and neck supple. Cardiovascular:      Rate and Rhythm: Normal rate and regular rhythm. Heart sounds: Normal heart sounds. No murmur. No friction rub. No gallop.        Comments: Pacemaker in place.   Pulmonary:      Effort: Pulmonary effort is normal. No respiratory distress. Breath sounds: Normal breath sounds. Abdominal:      General: Bowel sounds are normal.      Palpations: Abdomen is soft. Musculoskeletal: Normal range of motion. General: No tenderness. Neurological:      Mental Status: He is alert and oriented to person, place, and time. ASSESSMENT/PLAN:    1. Chronic bilateral low back pain with right-sided sciatica  - diazePAM (VALIUM) 10 MG tablet; TAKE 1 TABLET BY MOUTH EVERY TWELVE HOURS AS NEEDED FOR ANXIETY FOR UP TO 30 DAYS  Dispense: 60 tablet; Refill: 5    2. Essential hypertension  - well controlled   - metoprolol succinate (TOPROL XL) 25 MG extended release tablet; Take 1 tablet by mouth daily  Dispense: 30 tablet; Refill: 5    3. DDD (degenerative disc disease), lumbosacral  - diclofenac sodium (VOLTAREN) 1 % GEL; Apply 4 g topically 4 times daily  Dispense: 2 Tube; Refill: 0    4. Arthritis of knee, left  - diclofenac sodium (VOLTAREN) 1 % GEL; Apply 4 g topically 4 times daily  Dispense: 2 Tube; Refill: 0    5. Generalized anxiety disorder  - diazePAM (VALIUM) 10 MG tablet; TAKE 1 TABLET BY MOUTH EVERY TWELVE HOURS AS NEEDED FOR ANXIETY FOR UP TO 30 DAYS  Dispense: 60 tablet; Refill: 5    6. Smoking history  - nicotine (NICODERM CQ) 21 MG/24HR; Place 1 patch onto the skin every 24 hours  Dispense: 30 patch; Refill: 3      Return in about 6 months (around 4/2/2021). An electronic signature was used to authenticate this note.     --Shaniqua Ocampo MD on 10/2/2020 at 2:47 PM

## 2021-01-19 ENCOUNTER — NURSE ONLY (OUTPATIENT)
Dept: CARDIOLOGY CLINIC | Age: 71
End: 2021-01-19
Payer: MEDICARE

## 2021-01-19 DIAGNOSIS — Z95.0 PACEMAKER: ICD-10-CM

## 2021-01-19 DIAGNOSIS — R00.1 BRADYCARDIA: ICD-10-CM

## 2021-01-19 PROCEDURE — 93294 REM INTERROG EVL PM/LDLS PM: CPT | Performed by: INTERNAL MEDICINE

## 2021-01-19 PROCEDURE — 93296 REM INTERROG EVL PM/IDS: CPT | Performed by: INTERNAL MEDICINE

## 2021-01-19 NOTE — PROGRESS NOTES
We received remote transmission from patient's monitor at home. Transmission shows normal sensing and pacing function. EP physician will review. See interrogation under cardiology tab in the [address] [facility] [address] field for more details. Episodes Since: 16-Jul-2020  1 Non-sustained VT (toprol xl). 2 SVT, longest 2.5 min. Echo 1/2020-Left ventricular function is normal with ejection fraction estimated at  55-60%. Follow up in 3 months via carelink.

## 2021-04-20 ENCOUNTER — NURSE ONLY (OUTPATIENT)
Dept: CARDIOLOGY CLINIC | Age: 71
End: 2021-04-20
Payer: MEDICARE

## 2021-04-20 DIAGNOSIS — R00.1 BRADYCARDIA: ICD-10-CM

## 2021-04-20 DIAGNOSIS — Z95.0 PACEMAKER: ICD-10-CM

## 2021-04-20 NOTE — PROGRESS NOTES
We received remote transmission from patient's monitor at home. Transmission shows normal sensing and pacing function. EP physician will review. See interrogation under cardiology tab in the [address] [facility] [address] field for more details. No  arrhythmias recorded. Follow up in 3 months via carelink.

## 2021-04-24 PROCEDURE — 93296 REM INTERROG EVL PM/IDS: CPT | Performed by: INTERNAL MEDICINE

## 2021-04-24 PROCEDURE — 93294 REM INTERROG EVL PM/LDLS PM: CPT | Performed by: INTERNAL MEDICINE

## 2021-04-28 ENCOUNTER — OFFICE VISIT (OUTPATIENT)
Dept: INTERNAL MEDICINE CLINIC | Age: 71
End: 2021-04-28
Payer: MEDICARE

## 2021-04-28 VITALS
TEMPERATURE: 97 F | HEART RATE: 94 BPM | DIASTOLIC BLOOD PRESSURE: 85 MMHG | HEIGHT: 71 IN | BODY MASS INDEX: 22.31 KG/M2 | SYSTOLIC BLOOD PRESSURE: 126 MMHG | RESPIRATION RATE: 16 BRPM | WEIGHT: 159.4 LBS | OXYGEN SATURATION: 96 %

## 2021-04-28 DIAGNOSIS — E55.9 VITAMIN D DEFICIENCY: ICD-10-CM

## 2021-04-28 DIAGNOSIS — I10 ESSENTIAL HYPERTENSION: Primary | ICD-10-CM

## 2021-04-28 DIAGNOSIS — G89.29 CHRONIC BILATERAL LOW BACK PAIN WITH RIGHT-SIDED SCIATICA: ICD-10-CM

## 2021-04-28 DIAGNOSIS — M79.601 CHRONIC PAIN OF RIGHT UPPER EXTREMITY: ICD-10-CM

## 2021-04-28 DIAGNOSIS — G89.29 CHRONIC PAIN OF RIGHT UPPER EXTREMITY: ICD-10-CM

## 2021-04-28 DIAGNOSIS — M54.41 CHRONIC BILATERAL LOW BACK PAIN WITH RIGHT-SIDED SCIATICA: ICD-10-CM

## 2021-04-28 DIAGNOSIS — Z12.5 SCREENING PSA (PROSTATE SPECIFIC ANTIGEN): ICD-10-CM

## 2021-04-28 DIAGNOSIS — D51.9 ANEMIA DUE TO VITAMIN B12 DEFICIENCY, UNSPECIFIED B12 DEFICIENCY TYPE: ICD-10-CM

## 2021-04-28 DIAGNOSIS — F41.1 GENERALIZED ANXIETY DISORDER: ICD-10-CM

## 2021-04-28 PROCEDURE — 96372 THER/PROPH/DIAG INJ SC/IM: CPT

## 2021-04-28 PROCEDURE — 6360000002 HC RX W HCPCS: Performed by: STUDENT IN AN ORGANIZED HEALTH CARE EDUCATION/TRAINING PROGRAM

## 2021-04-28 PROCEDURE — 99213 OFFICE O/P EST LOW 20 MIN: CPT | Performed by: STUDENT IN AN ORGANIZED HEALTH CARE EDUCATION/TRAINING PROGRAM

## 2021-04-28 RX ORDER — CYANOCOBALAMIN 1000 UG/ML
1000 INJECTION INTRAMUSCULAR; SUBCUTANEOUS ONCE
Status: COMPLETED | OUTPATIENT
Start: 2021-04-28 | End: 2021-04-28

## 2021-04-28 RX ORDER — DIAZEPAM 10 MG/1
TABLET ORAL
Qty: 60 TABLET | Refills: 5 | Status: SHIPPED | OUTPATIENT
Start: 2021-04-28 | End: 2021-11-02

## 2021-04-28 RX ORDER — TRIAMCINOLONE ACETONIDE 40 MG/ML
40 INJECTION, SUSPENSION INTRA-ARTICULAR; INTRAMUSCULAR ONCE
Status: COMPLETED | OUTPATIENT
Start: 2021-04-28 | End: 2021-04-28

## 2021-04-28 RX ADMIN — CYANOCOBALAMIN 1000 MCG: 1000 INJECTION, SOLUTION INTRAMUSCULAR at 15:09

## 2021-04-28 RX ADMIN — TRIAMCINOLONE ACETONIDE 40 MG: 40 INJECTION, SUSPENSION INTRA-ARTICULAR; INTRAMUSCULAR at 15:07

## 2021-04-28 ASSESSMENT — ENCOUNTER SYMPTOMS
DIARRHEA: 0
NAUSEA: 0
ABDOMINAL PAIN: 0
SHORTNESS OF BREATH: 0
COUGH: 0
CONSTIPATION: 0

## 2021-04-28 NOTE — PROGRESS NOTES
Outpatient Clinic Established Patient Note    Patient: Val Guerra. : 1950 (66 y.o.)  Date: 2021    CC: routine f/u     HPI:      65 y/o M with PMH of HTN, chronic R arm pain, DDD/low back pain, CKD3, CHB s/p PPM who presents for routine follow-up. He has no new complaints but has been having worsening pain in his R wrist.   He is requesting cortisone shot for R wrist pain. Has had it previously and has helped. Pain is sharp. Denies reduced ROM but reports stiffness and holding objects. Pt had PPM per Dr Jignesh Greene on for CHB 2020. Now on Toprol for tachycardia on PPM    HTN  - monitors BP at home . Medication complaint. Denies ADR. Denies any urinary symptoms, chest pain, SOB, cough, abd pain, LE swelling, constipation/diarrhea. Home Meds:  Prior to Visit Medications    Medication Sig Taking? Authorizing Provider   diazePAM (VALIUM) 10 MG tablet TAKE 1 TABLET BY MOUTH EVERY TWELVE HOURS AS NEEDED FOR ANXIETY FOR UP TO 30 DAYS Yes Letty Tamayo MD   diclofenac sodium (VOLTAREN) 1 % GEL APPLY 4 G TOPICALLY 4 TIMES DAILY Yes Elizabeth Maria MD   metoprolol succinate (TOPROL XL) 25 MG extended release tablet Take 1 tablet by mouth daily Yes Elizabeth Maria MD   oxyCODONE-acetaminophen (PERCOCET) 5-325 MG per tablet Take 1 tablet by mouth 3 times daily as needed for Pain . Historical Provider, MD       Allergies:    Patient has no known allergies.     Health Maintenance Due   Topic Date Due    DTaP/Tdap/Td vaccine (1 - Tdap) Never done    Annual Wellness Visit (AWV)  Never done       Immunization History   Administered Date(s) Administered    Influenza Virus Vaccine 2011, 2015    Influenza, High Dose (Fluzone 65 yrs and older) 2014, 2017, 01/15/2019, 2020    Pneumococcal Conjugate 13-valent ([id number]) 05/10/2017    Pneumococcal Polysaccharide ([id number]) 2009, 10/14/2014       Review of Systems   Constitutional: Negative for fatigue and fever.   Respiratory: Negative for cough and shortness of breath. Cardiovascular: Negative for chest pain, palpitations and leg swelling. Gastrointestinal: Negative for abdominal pain, constipation, diarrhea and nausea. Genitourinary: Negative for dysuria and frequency. A 10-organ Review Of Systems was obtained and otherwise unremarkable except as per HPI. Data: Old records have been reviewed electronically. PHYSICAL EXAM:  /85 (Site: Right Upper Arm, Position: Sitting, Cuff Size: Large Adult)   Pulse 94   Temp 97 °F (36.1 °C) (Temporal)   Resp 16   Ht 5' 11\" (1.803 m)   Wt 159 lb 6.4 oz (72.3 kg)   SpO2 96%   BMI 22.23 kg/m²   Physical Exam  Constitutional:       Appearance: He is not ill-appearing. Cardiovascular:      Rate and Rhythm: Normal rate. Heart sounds: Normal heart sounds. Pulmonary:      Effort: No respiratory distress. Breath sounds: Normal breath sounds. Abdominal:      Palpations: Abdomen is soft. Tenderness: There is no abdominal tenderness. Musculoskeletal:      Right lower leg: No edema. Left lower leg: No edema. Neurological:      Mental Status: He is alert and oriented to person, place, and time. Assessment & Plan:      1. Essential hypertension  BP well controlled  - cont current medications  - CBC; Future  - COMPREHENSIVE METABOLIC PANEL; Future    2. Generalized anxiety disorder  - diazePAM (VALIUM) 10 MG tablet; TAKE 1 TABLET BY MOUTH EVERY TWELVE HOURS AS NEEDED FOR ANXIETY FOR UP TO 30 DAYS  Dispense: 60 tablet; Refill: 5    3. Chronic bilateral low back pain with right-sided sciatica  - diazePAM (VALIUM) 10 MG tablet; TAKE 1 TABLET BY MOUTH EVERY TWELVE HOURS AS NEEDED FOR ANXIETY FOR UP TO 30 DAYS  Dispense: 60 tablet; Refill: 5    4. Chronic pain of right upper extremity  - kenalog shot 40 mg in R deltoid     5. Screening PSA (prostate specific antigen)  - Psa screening; Future    6.  Vitamin D deficiency  - Vitamin D 25

## 2021-07-20 ENCOUNTER — NURSE ONLY (OUTPATIENT)
Dept: CARDIOLOGY CLINIC | Age: 71
End: 2021-07-20

## 2021-07-20 DIAGNOSIS — Z95.0 PACEMAKER: ICD-10-CM

## 2021-07-20 DIAGNOSIS — R00.1 BRADYCARDIA: ICD-10-CM

## 2021-07-20 PROCEDURE — 93294 REM INTERROG EVL PM/LDLS PM: CPT | Performed by: INTERNAL MEDICINE

## 2021-07-20 PROCEDURE — 93296 REM INTERROG EVL PM/IDS: CPT | Performed by: INTERNAL MEDICINE

## 2021-07-20 NOTE — LETTER
[address] [facility] [phone]  [address] [facility]    Pacemaker/Defibrillator Clinic    07/20/21      [facility] ANDRE [address] [place] [id number]      Dear Cathy Matta. This letter is to inform you that we received the transmission from your monitor at home that checks your implanted heart device. The next date your monitor will automatically transmit will be 10/19. If your report needs attention we will notify you. Your device and monitor are wireless and most transmit cellularly, but please periodically check your monitor is still plugged in to the electrical outlet. If you still use the telephone land line to send please ensure the connection to the phone billy is secure. This will help to ensure successful automatic transmissions in the future. Also, the monitor needs to be close to you while sleeping at night. Please be aware that the remote device transmission sites are periodically monitored only during regular business hours during which simultaneous in-office device clinics are being run. If your transmission requires attention, we will contact you as soon as possible. **PLEASE NOTE** that our [facility] policy and processes are changing to ensure a more seamless approach for all parties involved, allowing more time for our nurses to address patient issues and concerns. We will no longer be sending letters for NORMAL remote transmissions. You will be contacted by phone if your transmission requires attention (as previously done), and letters will only be sent regarding monitor disconnections or missed transmissions if you are unable to be reached by phone. Please do not be alarmed by this new process, as we will continue to contact you if your transmission report requires attention. This will be your final \"remote received\" letter.   From this point forward, the [facility] will be utilizing the no news is good news approach. As always, please feel free to contact your nurse with any questions or concerns. Thank you.     [address]

## 2021-07-20 NOTE — PROGRESS NOTES
We received remote transmission from patient's dual chamber pacemaker monitor at home. Transmission shows normal sensing and pacing function. No new arrhythmias/events recorded. EP physician will review. See interrogation under cardiology tab in the [address] [place] [address] [address] field for more details.

## 2021-07-28 ENCOUNTER — OFFICE VISIT (OUTPATIENT)
Dept: INTERNAL MEDICINE CLINIC | Age: 71
End: 2021-07-28
Payer: MEDICARE

## 2021-07-28 VITALS
BODY MASS INDEX: 22.27 KG/M2 | RESPIRATION RATE: 24 BRPM | OXYGEN SATURATION: 97 % | TEMPERATURE: 97.9 F | SYSTOLIC BLOOD PRESSURE: 133 MMHG | DIASTOLIC BLOOD PRESSURE: 78 MMHG | WEIGHT: 159.1 LBS | HEIGHT: 71 IN | HEART RATE: 84 BPM

## 2021-07-28 DIAGNOSIS — D51.9 ANEMIA DUE TO VITAMIN B12 DEFICIENCY, UNSPECIFIED B12 DEFICIENCY TYPE: ICD-10-CM

## 2021-07-28 DIAGNOSIS — M51.37 DDD (DEGENERATIVE DISC DISEASE), LUMBOSACRAL: ICD-10-CM

## 2021-07-28 DIAGNOSIS — G89.29 CHRONIC PAIN OF RIGHT UPPER EXTREMITY: ICD-10-CM

## 2021-07-28 DIAGNOSIS — M79.601 CHRONIC PAIN OF RIGHT UPPER EXTREMITY: ICD-10-CM

## 2021-07-28 DIAGNOSIS — E55.9 VITAMIN D DEFICIENCY: ICD-10-CM

## 2021-07-28 DIAGNOSIS — G62.9 NEUROPATHY: ICD-10-CM

## 2021-07-28 DIAGNOSIS — M25.562 ACUTE PAIN OF LEFT KNEE: Primary | ICD-10-CM

## 2021-07-28 DIAGNOSIS — I10 ESSENTIAL HYPERTENSION: ICD-10-CM

## 2021-07-28 DIAGNOSIS — Z12.5 SCREENING PSA (PROSTATE SPECIFIC ANTIGEN): ICD-10-CM

## 2021-07-28 LAB
HCT VFR BLD CALC: 44.1 % (ref 40.5–52.5)
HEMOGLOBIN: 14.9 G/DL (ref 13.5–17.5)
MCH RBC QN AUTO: 32.8 PG (ref 26–34)
MCHC RBC AUTO-ENTMCNC: 33.8 G/DL (ref 31–36)
MCV RBC AUTO: 97.2 FL (ref 80–100)
PDW BLD-RTO: 13.2 % (ref 12.4–15.4)
PLATELET # BLD: 180 K/UL (ref 135–450)
PMV BLD AUTO: 11.6 FL (ref 5–10.5)
RBC # BLD: 4.54 M/UL (ref 4.2–5.9)
WBC # BLD: 7.1 K/UL (ref 4–11)

## 2021-07-28 PROCEDURE — 99213 OFFICE O/P EST LOW 20 MIN: CPT | Performed by: STUDENT IN AN ORGANIZED HEALTH CARE EDUCATION/TRAINING PROGRAM

## 2021-07-28 PROCEDURE — 6360000002 HC RX W HCPCS: Performed by: STUDENT IN AN ORGANIZED HEALTH CARE EDUCATION/TRAINING PROGRAM

## 2021-07-28 RX ORDER — TRIAMCINOLONE ACETONIDE 40 MG/ML
40 INJECTION, SUSPENSION INTRA-ARTICULAR; INTRAMUSCULAR ONCE
Status: COMPLETED | OUTPATIENT
Start: 2021-07-28 | End: 2021-07-28

## 2021-07-28 RX ORDER — CYANOCOBALAMIN 1000 UG/ML
1000 INJECTION INTRAMUSCULAR; SUBCUTANEOUS ONCE
Status: COMPLETED | OUTPATIENT
Start: 2021-07-28 | End: 2021-07-28

## 2021-07-28 RX ORDER — METHYLPREDNISOLONE 4 MG/1
TABLET ORAL
Qty: 1 KIT | Refills: 0 | Status: SHIPPED | OUTPATIENT
Start: 2021-07-28 | End: 2021-08-03

## 2021-07-28 RX ADMIN — CYANOCOBALAMIN 1000 MCG: 1000 INJECTION, SOLUTION INTRAMUSCULAR at 16:45

## 2021-07-28 RX ADMIN — TRIAMCINOLONE ACETONIDE 40 MG: 40 INJECTION, SUSPENSION INTRA-ARTICULAR; INTRAMUSCULAR at 16:46

## 2021-07-29 LAB
A/G RATIO: 0.9 (ref 1.1–2.2)
ALBUMIN SERPL-MCNC: 3.6 G/DL (ref 3.4–5)
ALP BLD-CCNC: 121 U/L (ref 40–129)
ALT SERPL-CCNC: 35 U/L (ref 10–40)
ANION GAP SERPL CALCULATED.3IONS-SCNC: 12 MMOL/L (ref 3–16)
AST SERPL-CCNC: 41 U/L (ref 15–37)
BILIRUB SERPL-MCNC: 0.4 MG/DL (ref 0–1)
BUN BLDV-MCNC: 38 MG/DL (ref 7–20)
CALCIUM SERPL-MCNC: 9.4 MG/DL (ref 8.3–10.6)
CHLORIDE BLD-SCNC: 103 MMOL/L (ref 99–110)
CO2: 22 MMOL/L (ref 21–32)
CREAT SERPL-MCNC: 1.8 MG/DL (ref 0.8–1.3)
GFR AFRICAN AMERICAN: 45
GFR NON-AFRICAN AMERICAN: 37
GLOBULIN: 3.9 G/DL
GLUCOSE BLD-MCNC: 94 MG/DL (ref 70–99)
POTASSIUM SERPL-SCNC: 4.7 MMOL/L (ref 3.5–5.1)
PROSTATE SPECIFIC ANTIGEN: 0.9 NG/ML (ref 0–4)
SODIUM BLD-SCNC: 137 MMOL/L (ref 136–145)
TOTAL PROTEIN: 7.5 G/DL (ref 6.4–8.2)
VITAMIN D 25-HYDROXY: 54.7 NG/ML

## 2021-09-07 DIAGNOSIS — I10 ESSENTIAL HYPERTENSION: ICD-10-CM

## 2021-09-08 RX ORDER — METOPROLOL SUCCINATE 25 MG/1
TABLET, EXTENDED RELEASE ORAL
Qty: 30 TABLET | Refills: 5 | Status: SHIPPED | OUTPATIENT
Start: 2021-09-08 | End: 2022-02-09

## 2021-09-08 NOTE — TELEPHONE ENCOUNTER
RE metoprolol succinate; last filled 10/2/2020  Last ov 7/28/21 Dr. Riki Cardenas  Next appt 10/27/21

## 2021-10-19 ENCOUNTER — NURSE ONLY (OUTPATIENT)
Dept: CARDIOLOGY CLINIC | Age: 71
End: 2021-10-19
Payer: MEDICARE

## 2021-10-19 DIAGNOSIS — Z95.0 PACEMAKER: ICD-10-CM

## 2021-10-19 DIAGNOSIS — R00.1 BRADYCARDIA: ICD-10-CM

## 2021-10-19 PROCEDURE — 93296 REM INTERROG EVL PM/IDS: CPT | Performed by: INTERNAL MEDICINE

## 2021-10-19 PROCEDURE — 93294 REM INTERROG EVL PM/LDLS PM: CPT | Performed by: INTERNAL MEDICINE

## 2021-10-19 NOTE — PROGRESS NOTES
We received remote transmission from patient's dual chamber pacemaker monitor at home. Transmission shows normal sensing and pacing function. NSVT noted (Toprol XL). EP physician will review. See interrogation under cardiology tab in the [address] [place] [address] [address] field for more details. Will continue to monitor remotely.

## 2021-10-27 ENCOUNTER — OFFICE VISIT (OUTPATIENT)
Dept: INTERNAL MEDICINE CLINIC | Age: 71
End: 2021-10-27
Payer: MEDICARE

## 2021-10-27 VITALS
DIASTOLIC BLOOD PRESSURE: 75 MMHG | HEART RATE: 109 BPM | OXYGEN SATURATION: 96 % | WEIGHT: 151.8 LBS | SYSTOLIC BLOOD PRESSURE: 106 MMHG | BODY MASS INDEX: 21.25 KG/M2 | HEIGHT: 71 IN | TEMPERATURE: 97.4 F

## 2021-10-27 DIAGNOSIS — F41.0 PANIC ATTACKS: ICD-10-CM

## 2021-10-27 DIAGNOSIS — J30.89 NON-SEASONAL ALLERGIC RHINITIS, UNSPECIFIED TRIGGER: Primary | ICD-10-CM

## 2021-10-27 DIAGNOSIS — G89.29 CHRONIC BILATERAL LOW BACK PAIN WITH RIGHT-SIDED SCIATICA: ICD-10-CM

## 2021-10-27 DIAGNOSIS — M51.37 DDD (DEGENERATIVE DISC DISEASE), LUMBOSACRAL: ICD-10-CM

## 2021-10-27 DIAGNOSIS — N52.9 ERECTILE DYSFUNCTION, UNSPECIFIED ERECTILE DYSFUNCTION TYPE: ICD-10-CM

## 2021-10-27 DIAGNOSIS — M54.41 CHRONIC BILATERAL LOW BACK PAIN WITH RIGHT-SIDED SCIATICA: ICD-10-CM

## 2021-10-27 DIAGNOSIS — E53.8 VITAMIN B12 DEFICIENCY: ICD-10-CM

## 2021-10-27 PROBLEM — I44.2 CHB (COMPLETE HEART BLOCK) (HCC): Status: ACTIVE | Noted: 2021-10-27

## 2021-10-27 PROCEDURE — 6360000002 HC RX W HCPCS: Performed by: STUDENT IN AN ORGANIZED HEALTH CARE EDUCATION/TRAINING PROGRAM

## 2021-10-27 PROCEDURE — 99213 OFFICE O/P EST LOW 20 MIN: CPT | Performed by: STUDENT IN AN ORGANIZED HEALTH CARE EDUCATION/TRAINING PROGRAM

## 2021-10-27 RX ORDER — TADALAFIL 5 MG/1
5 TABLET ORAL DAILY PRN
Qty: 10 TABLET | Refills: 2 | Status: SHIPPED | OUTPATIENT
Start: 2021-10-27 | End: 2021-10-27

## 2021-10-27 RX ORDER — CYANOCOBALAMIN 1000 UG/ML
1000 INJECTION INTRAMUSCULAR; SUBCUTANEOUS ONCE
Status: COMPLETED | OUTPATIENT
Start: 2021-10-27 | End: 2021-10-27

## 2021-10-27 RX ORDER — TRIAMCINOLONE ACETONIDE 40 MG/ML
40 INJECTION, SUSPENSION INTRA-ARTICULAR; INTRAMUSCULAR ONCE
Status: COMPLETED | OUTPATIENT
Start: 2021-10-27 | End: 2021-10-27

## 2021-10-27 RX ORDER — HYDROXYZINE HYDROCHLORIDE 25 MG/1
25 TABLET, FILM COATED ORAL EVERY 6 HOURS PRN
Qty: 30 TABLET | Refills: 0 | Status: SHIPPED | OUTPATIENT
Start: 2021-10-27 | End: 2021-11-26

## 2021-10-27 RX ORDER — LORATADINE 10 MG/1
10 TABLET ORAL DAILY
Qty: 90 TABLET | Refills: 1 | Status: SHIPPED | OUTPATIENT
Start: 2021-10-27 | End: 2022-07-25 | Stop reason: SDUPTHER

## 2021-10-27 RX ORDER — TADALAFIL 5 MG/1
5 TABLET ORAL DAILY PRN
Qty: 4 TABLET | Refills: 2 | Status: SHIPPED | OUTPATIENT
Start: 2021-10-27

## 2021-10-27 RX ADMIN — CYANOCOBALAMIN 1000 MCG: 1000 INJECTION, SOLUTION INTRAMUSCULAR at 15:09

## 2021-10-27 RX ADMIN — TRIAMCINOLONE ACETONIDE 40 MG: 40 INJECTION, SUSPENSION INTRA-ARTICULAR; INTRAMUSCULAR at 15:10

## 2021-10-27 NOTE — PROGRESS NOTES
Outpatient Clinic Established Patient Note    Patient: Verlena Schlatter. : 1950 (66 y.o.)  Date: 10/27/2021    CC: anxiety attack     HPI:      65 y/o M with PMH of HTN, chronic R arm pain, DDD/low back pain, CKD3, CHB s/p PPM who presents for anxiety attack    Pt has been having 10 min episodes of panic attacks, X 3 times a day over the past two weeks. He has had some acute stressors in his life in the last few weeks. Pt takes valium BID which helps a little bit. Pt was tearful during the interview. No SI, No HI. Endorses some mild depression. Pt also complaining of rhinorrhea. Initially was yellow in color with subjective fevers. Currently clear rhinorrhea, worsened when he steps out of the house. Home Meds:  Prior to Visit Medications    Medication Sig Taking? Authorizing Provider   hydrOXYzine (ATARAX) 25 MG tablet Take 1 tablet by mouth every 6 hours as needed for Anxiety Yes Winnie Callaway MD   loratadine (CLARITIN) 10 MG tablet Take 1 tablet by mouth daily Yes Winnie Callaway MD   tadalafil (CIALIS) 5 MG tablet Take 1 tablet by mouth daily as needed for Erectile Dysfunction Yes Winnie Callaway MD   metoprolol succinate (TOPROL XL) 25 MG extended release tablet TAKE 1 TABLET BY MOUTH EVERY DAY Yes Winnie Callaway MD   diazePAM (VALIUM) 10 MG tablet TAKE 1 TABLET BY MOUTH EVERY TWELVE HOURS AS NEEDED FOR ANXIETY FOR UP TO 30 DAYS Yes Darin Gong MD   oxyCODONE-acetaminophen (PERCOCET) 5-325 MG per tablet Take 1 tablet by mouth 3 times daily as needed for Pain . Yes Historical Provider, MD   NONFORMULARY Indications: using otc \"vapor\" inhaler; can't read label   Patient not taking: No sig reported  Historical Provider, MD   diclofenac sodium (VOLTAREN) 1 % GEL APPLY 4 G TOPICALLY 4 TIMES DAILY  Patient not taking: Reported on 10/27/2021  Fátima Zepeda MD     Allergies:    Patient has no known allergies.     Health Maintenance Due   Topic Date Due    COVID-19 Vaccine (1) Never done    DTaP/Tdap/Td vaccine (1 - Tdap) Never done    Annual Wellness Visit (AWV)  Never done       Immunization History   Administered Date(s) Administered    Influenza Virus Vaccine 09/16/2011, 09/21/2015    Influenza, High Dose (Fluzone 65 yrs and older) 12/11/2014, 12/27/2017, 01/15/2019, 01/23/2020    Pneumococcal Conjugate 13-valent ([id number]) 05/10/2017    Pneumococcal Polysaccharide ([id number]) 12/14/2009, 10/14/2014     PHYSICAL EXAM:  /75 (Site: Right Upper Arm, Position: Sitting, Cuff Size: Medium Adult)   Pulse 109   Temp 97.4 °F (36.3 °C) (Temporal)   Ht 5' 11\" (1.803 m)   Wt 151 lb 12.8 oz (68.9 kg)   SpO2 96%   BMI 21.17 kg/m²   Physical Exam  Constitutional:       General: He is not in acute distress. Appearance: Normal appearance. He is normal weight. He is not ill-appearing, toxic-appearing or diaphoretic. Eyes:      Pupils: Pupils are equal, round, and reactive to light. Cardiovascular:      Rate and Rhythm: Normal rate and regular rhythm. Pulses: Normal pulses. Heart sounds: Normal heart sounds. No murmur heard. Pulmonary:      Effort: Pulmonary effort is normal. No respiratory distress. Breath sounds: Normal breath sounds. Abdominal:      General: Bowel sounds are normal. There is no distension. Palpations: Abdomen is soft. Musculoskeletal:         General: No swelling. Neurological:      Mental Status: He is alert and oriented to person, place, and time. Mental status is at baseline. Assessment & Plan:      1. Non-seasonal allergic rhinitis, unspecified trigger  - loratadine (CLARITIN) 10 MG tablet; Take 1 tablet by mouth daily  Dispense: 90 tablet; Refill: 1  - clear rhinorrhea now    2. DDD (degenerative disc disease), lumbosacral  3. Chronic bilateral low back pain with right-sided sciatica  - triamcinolone acetonide (KENALOG-40) injection 40 mg    4. Vitamin B12 deficiency  - b12 shot    5.  Panic attacks  - hydrOXYzine (ATARAX) 25 MG tablet; Take 1 tablet by mouth every 6 hours as needed for Anxiety  Dispense: 30 tablet; Refill: 0    Acute stressor causing panic attack. Will monitor    6. Erectile dysfunction, unspecified erectile dysfunction type  - tadalafil (CIALIS) 5 MG tablet; Take 1 tablet by mouth daily as needed for Erectile Dysfunction  Dispense: 4 tablet; Refill: 2    Return in about 3 months (around 1/27/2022).     Dispo: Pt has been staffed with Dr. Claudeen Fermo  _______________  Katherine Brito MD, 10/27/2021 3:19 PM   PGY-2

## 2021-11-02 DIAGNOSIS — G89.29 CHRONIC BILATERAL LOW BACK PAIN WITH RIGHT-SIDED SCIATICA: ICD-10-CM

## 2021-11-02 DIAGNOSIS — F41.1 GENERALIZED ANXIETY DISORDER: ICD-10-CM

## 2021-11-02 DIAGNOSIS — M54.41 CHRONIC BILATERAL LOW BACK PAIN WITH RIGHT-SIDED SCIATICA: ICD-10-CM

## 2021-11-02 RX ORDER — DIAZEPAM 10 MG/1
TABLET ORAL
Qty: 60 TABLET | Refills: 3 | Status: SHIPPED | OUTPATIENT
Start: 2021-11-02 | End: 2021-12-02

## 2022-01-18 ENCOUNTER — NURSE ONLY (OUTPATIENT)
Dept: CARDIOLOGY CLINIC | Age: 72
End: 2022-01-18
Payer: MEDICARE

## 2022-01-18 DIAGNOSIS — R00.1 BRADYCARDIA: ICD-10-CM

## 2022-01-18 DIAGNOSIS — Z95.0 PACEMAKER: ICD-10-CM

## 2022-01-18 DIAGNOSIS — I44.2 CHB (COMPLETE HEART BLOCK) (HCC): ICD-10-CM

## 2022-01-18 PROCEDURE — 93294 REM INTERROG EVL PM/LDLS PM: CPT | Performed by: INTERNAL MEDICINE

## 2022-01-18 PROCEDURE — 93296 REM INTERROG EVL PM/IDS: CPT | Performed by: INTERNAL MEDICINE

## 2022-01-19 NOTE — PROGRESS NOTES
We received remote transmission from patient's dual chamber pacemaker monitor at home. Transmission shows normal sensing and pacing function. ST/SVT/AT and NSVT noted (Toprol XL). Ap 1.9%   0.7%  Echo 01.2020 showed EF of 55-60%. EP physician will review. See interrogation under cardiology tab in the [address] [place] [address] [address] field for more details. Will continue to monitor remotely.

## 2022-01-24 ENCOUNTER — TELEPHONE (OUTPATIENT)
Dept: CARDIOLOGY CLINIC | Age: 72
End: 2022-01-24

## 2022-01-24 NOTE — TELEPHONE ENCOUNTER
Pietro Jiménez,  Please reach out to patient to schedule routine EP OV and Device. Appears last seen 4.2020. Thanks.

## 2022-01-26 ENCOUNTER — OFFICE VISIT (OUTPATIENT)
Dept: INTERNAL MEDICINE CLINIC | Age: 72
End: 2022-01-26
Payer: MEDICARE

## 2022-01-26 VITALS
HEIGHT: 71 IN | WEIGHT: 148.3 LBS | OXYGEN SATURATION: 96 % | HEART RATE: 89 BPM | BODY MASS INDEX: 20.76 KG/M2 | TEMPERATURE: 97 F | SYSTOLIC BLOOD PRESSURE: 149 MMHG | DIASTOLIC BLOOD PRESSURE: 97 MMHG

## 2022-01-26 DIAGNOSIS — M25.562 CHRONIC PAIN OF BOTH KNEES: Primary | ICD-10-CM

## 2022-01-26 DIAGNOSIS — G89.29 CHRONIC PAIN OF BOTH KNEES: Primary | ICD-10-CM

## 2022-01-26 DIAGNOSIS — R63.0 APPETITE ABSENT: ICD-10-CM

## 2022-01-26 DIAGNOSIS — M25.561 CHRONIC PAIN OF BOTH KNEES: Primary | ICD-10-CM

## 2022-01-26 PROCEDURE — 99213 OFFICE O/P EST LOW 20 MIN: CPT | Performed by: SURGERY

## 2022-01-26 PROCEDURE — 96372 THER/PROPH/DIAG INJ SC/IM: CPT

## 2022-01-26 PROCEDURE — 6360000002 HC RX W HCPCS: Performed by: SURGERY

## 2022-01-26 RX ORDER — CYANOCOBALAMIN 1000 UG/ML
1000 INJECTION INTRAMUSCULAR; SUBCUTANEOUS ONCE
Status: COMPLETED | OUTPATIENT
Start: 2022-01-26 | End: 2022-01-26

## 2022-01-26 RX ORDER — TRIAMCINOLONE ACETONIDE 40 MG/ML
40 INJECTION, SUSPENSION INTRA-ARTICULAR; INTRAMUSCULAR ONCE
Status: COMPLETED | OUTPATIENT
Start: 2022-01-26 | End: 2022-01-26

## 2022-01-26 RX ORDER — MIRTAZAPINE 15 MG/1
15 TABLET, FILM COATED ORAL NIGHTLY
Qty: 90 TABLET | Refills: 1 | Status: SHIPPED | OUTPATIENT
Start: 2022-01-26

## 2022-01-26 RX ADMIN — CYANOCOBALAMIN 1000 MCG: 1000 INJECTION, SOLUTION INTRAMUSCULAR at 15:36

## 2022-01-26 RX ADMIN — TRIAMCINOLONE ACETONIDE 40 MG: 40 INJECTION, SUSPENSION INTRA-ARTICULAR; INTRAMUSCULAR at 16:01

## 2022-01-26 ASSESSMENT — PATIENT HEALTH QUESTIONNAIRE - PHQ9
2. FEELING DOWN, DEPRESSED OR HOPELESS: 0
SUM OF ALL RESPONSES TO PHQ9 QUESTIONS 1 & 2: 0
SUM OF ALL RESPONSES TO PHQ QUESTIONS 1-9: 0
1. LITTLE INTEREST OR PLEASURE IN DOING THINGS: 0
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
## 2022-01-26 NOTE — PATIENT INSTRUCTIONS
Please call [facility] to make an appointment to have your knee x-rays are done. If you continue to experience knee pain after a week or two please call us and we can figure out a pain management plan. Come back and see us in 3 months.

## 2022-02-04 NOTE — PROGRESS NOTES
[facility]   Electrophysiology  Office Visit  Date: 2/9/2022    Chief Complaint   Patient presents with    Bradycardia    Tachycardia    Hypertension       Cardiac HX: Starla Owusu is a 66 y.o. man w/ PMH HTN, recurrent falls, syncope, p/w fall, noted to have intermittent CHB on tele, s/p dual ch MDT PPM (1/3/19, Dr. Ira Trinidad)     Interval History/HPI: Pt is here for f/u for CHB, PPM mgmt, HTN. Device check shows  0.3%, AP 0.9%, 7 episodes of VT, some appearing to be AT/SVT, 28 fast A&V episodes some appearing to be AT/SVT/ST. Pt denies palpitations, heart racing, dizziness, lightheadedness. No CP, SOB, PND, orthopnea, BLE edema. BP well controlled. Home medications:   Current Outpatient Medications on File Prior to Visit   Medication Sig Dispense Refill    mirtazapine (REMERON) 15 MG tablet Take 1 tablet by mouth nightly 90 tablet 1    loratadine (CLARITIN) 10 MG tablet Take 1 tablet by mouth daily 90 tablet 1    tadalafil (CIALIS) 5 MG tablet Take 1 tablet by mouth daily as needed for Erectile Dysfunction 4 tablet 2    NONFORMULARY Indications: using otc \"vapor\" inhaler; can't read label       diclofenac sodium (VOLTAREN) 1 % GEL APPLY 4 G TOPICALLY 4 TIMES DAILY 200 g 0    oxyCODONE-acetaminophen (PERCOCET) 5-325 MG per tablet Take 1 tablet by mouth 3 times daily as needed for Pain . No current facility-administered medications on file prior to visit. Past Medical History:   Diagnosis Date    Hypertension 5/18/2010    Low back pain 5/18/2010    Neck fracture [facility])         Past Surgical History:   Procedure Laterality Date    FRACTURE SURGERY      HIP SURGERY      left    NECK SURGERY      PACEMAKER PLACEMENT      TOTAL HIP ARTHROPLASTY      right       Family History:  Reviewed. family history includes Cancer in his father and mother; High Blood Pressure in his father. Review of System:    · Constitutional: No fevers, chills.    · Eyes: No visual changes or TSH  No results for input(s): WBC, HGB, HCT, MCV, PLT in the last 72 hours. Lab Results   Component Value Date    CKTOTAL 77 10/20/2013    TROPONINI <0.01 12/29/2019     No results found for: BNP  No results found for: PROTIME, INR  Lab Results   Component Value Date    CHOL 131 04/16/2019    HDL 32 04/16/2019    HDL 33 08/25/2011    TRIG 497 04/16/2019       ECG: Personally reviewed: NSR, HR 95, , QRS 88, QTc 405    ECHO:  1/2020     Conclusions      Summary   Left ventricular cavity size is normal with mild concentric left ventricular   hypertrophy. Left ventricular function is normal with ejection fraction estimated at   55-60%. Aortic valve appears slightly thickened/calcified but opens adequately. Mild tricuspid regurgitation. Estimated pulmonary artery systolic pressure is at 32 mmHg assuming a right   atrial pressure of 3 mmHg. Stress Test: n/a    Cardiac Angiography: n/a    Problem List:   Patient Active Problem List    Diagnosis Date Noted    Appetite absent 01/26/2022    CHB (complete heart block) ([address] 10/27/2021    Vitamin B12 deficiency 10/27/2021    Non-seasonal allergic rhinitis 10/27/2021    Bradycardia 03/05/2020    Fever 01/06/2020    Phlebitis of right arm 01/06/2020    Pacemaker 01/03/2020    Acute renal failure (ARF) ([address] 12/29/2019    DDD (degenerative disc disease), lumbosacral 10/10/2013    Elevated LFTs 10/16/2012    Neck pain 07/16/2012    Hand pain, right 07/16/2012    Insomnia 07/16/2012    Neuropathy 07/16/2012    Hypertension 05/18/2010    Low back pain 05/18/2010        Assessment:   1. Bradycardia    2. Essential hypertension    3. CHB (complete heart block) (HCC)    4. Pacemaker    5. NSVT (nonsustained ventricular tachycardia) ([facility])    6. SVT (supraventricular tachycardia) ([address]    7.  Atrial tachycardia (HCC)        CHB/TBS  - s/p dual ch MDT PPM  - Device check shows  0.3%, AP 0.8, 7 episodes of VT, some appearing to be AT/SVT, 28 fast A&V episodes some appearing to be AT/SVT/ST  - ECG ordered and results personally reviewed     NSVT/AT/SVT  - 7 episodes NSVT noted on device check, some appearing to be pAT/SVT, 28 fast A&V episodes  - On Toprol 25 mg QD, will increase to 50 mg QD  - Not interested in Echo/ Stress test at this time  - F/u 5 months    HTN  - BP well controlled  - On Toprol 25 mg QD- will increase to 50 mg QD  - Pt to take BP at home for next two weeks and call results into office    EF of 41-86%  No known systolic HF  No known CAD  No known AF  No tobacco use    All questions and concerns were addressed to the patient/family. Alternatives to my treatment were discussed. The note was completed using EMR. Every effort was made to ensure accuracy; however, inadvertent computerized transcription errors may be present. Patient received education regarding their diagnosis, treatment and medications while in the office today.       Matilde Phillips, [address]

## 2022-02-09 ENCOUNTER — NURSE ONLY (OUTPATIENT)
Dept: CARDIOLOGY CLINIC | Age: 72
End: 2022-02-09
Payer: MEDICARE

## 2022-02-09 ENCOUNTER — OFFICE VISIT (OUTPATIENT)
Dept: CARDIOLOGY CLINIC | Age: 72
End: 2022-02-09
Payer: MEDICARE

## 2022-02-09 VITALS
SYSTOLIC BLOOD PRESSURE: 130 MMHG | BODY MASS INDEX: 21.28 KG/M2 | HEART RATE: 94 BPM | DIASTOLIC BLOOD PRESSURE: 80 MMHG | WEIGHT: 152.6 LBS

## 2022-02-09 DIAGNOSIS — R00.1 BRADYCARDIA: Primary | ICD-10-CM

## 2022-02-09 DIAGNOSIS — I47.1 ATRIAL TACHYCARDIA (HCC): ICD-10-CM

## 2022-02-09 DIAGNOSIS — I10 ESSENTIAL HYPERTENSION: ICD-10-CM

## 2022-02-09 DIAGNOSIS — I44.2 CHB (COMPLETE HEART BLOCK) (HCC): ICD-10-CM

## 2022-02-09 DIAGNOSIS — R00.1 BRADYCARDIA: ICD-10-CM

## 2022-02-09 DIAGNOSIS — I47.1 SVT (SUPRAVENTRICULAR TACHYCARDIA) (HCC): ICD-10-CM

## 2022-02-09 DIAGNOSIS — I47.29 NSVT (NONSUSTAINED VENTRICULAR TACHYCARDIA): ICD-10-CM

## 2022-02-09 DIAGNOSIS — Z95.0 PACEMAKER: ICD-10-CM

## 2022-02-09 PROCEDURE — G8420 CALC BMI NORM PARAMETERS: HCPCS | Performed by: NURSE PRACTITIONER

## 2022-02-09 PROCEDURE — G8484 FLU IMMUNIZE NO ADMIN: HCPCS | Performed by: NURSE PRACTITIONER

## 2022-02-09 PROCEDURE — 1123F ACP DISCUSS/DSCN MKR DOCD: CPT | Performed by: NURSE PRACTITIONER

## 2022-02-09 PROCEDURE — 1036F TOBACCO NON-USER: CPT | Performed by: NURSE PRACTITIONER

## 2022-02-09 PROCEDURE — G8427 DOCREV CUR MEDS BY ELIG CLIN: HCPCS | Performed by: NURSE PRACTITIONER

## 2022-02-09 PROCEDURE — 99214 OFFICE O/P EST MOD 30 MIN: CPT | Performed by: NURSE PRACTITIONER

## 2022-02-09 PROCEDURE — 4040F PNEUMOC VAC/ADMIN/RCVD: CPT | Performed by: NURSE PRACTITIONER

## 2022-02-09 PROCEDURE — 3017F COLORECTAL CA SCREEN DOC REV: CPT | Performed by: NURSE PRACTITIONER

## 2022-02-09 RX ORDER — METOPROLOL SUCCINATE 50 MG/1
50 TABLET, EXTENDED RELEASE ORAL DAILY
Qty: 30 TABLET | Refills: 5 | Status: SHIPPED | OUTPATIENT
Start: 2022-02-09 | End: 2022-04-25 | Stop reason: SDUPTHER

## 2022-02-11 PROCEDURE — 93280 PM DEVICE PROGR EVAL DUAL: CPT | Performed by: INTERNAL MEDICINE

## 2022-02-23 DIAGNOSIS — I10 ESSENTIAL HYPERTENSION: ICD-10-CM

## 2022-02-23 RX ORDER — METOPROLOL SUCCINATE 25 MG/1
TABLET, EXTENDED RELEASE ORAL
Qty: 30 TABLET | Refills: 5 | OUTPATIENT
Start: 2022-02-23

## 2022-03-29 DIAGNOSIS — F41.1 GENERALIZED ANXIETY DISORDER: Primary | ICD-10-CM

## 2022-03-30 RX ORDER — DIAZEPAM 10 MG/1
TABLET ORAL
Qty: 60 TABLET | Refills: 0 | Status: SHIPPED | OUTPATIENT
Start: 2022-03-30 | End: 2022-04-25 | Stop reason: SDUPTHER

## 2022-04-18 ENCOUNTER — APPOINTMENT (OUTPATIENT)
Dept: GENERAL RADIOLOGY | Age: 72
End: 2022-04-18
Payer: MEDICARE

## 2022-04-18 ENCOUNTER — APPOINTMENT (OUTPATIENT)
Dept: CT IMAGING | Age: 72
End: 2022-04-18
Payer: MEDICARE

## 2022-04-18 ENCOUNTER — HOSPITAL ENCOUNTER (EMERGENCY)
Age: 72
Discharge: HOME | End: 2022-04-18
Attending: EMERGENCY MEDICINE
Payer: MEDICARE

## 2022-04-18 VITALS
TEMPERATURE: 97.5 F | SYSTOLIC BLOOD PRESSURE: 159 MMHG | OXYGEN SATURATION: 98 % | HEART RATE: 84 BPM | RESPIRATION RATE: 22 BRPM | DIASTOLIC BLOOD PRESSURE: 104 MMHG

## 2022-04-18 DIAGNOSIS — M54.31 SCIATICA OF RIGHT SIDE: Primary | ICD-10-CM

## 2022-04-18 PROCEDURE — 72072 X-RAY EXAM THORAC SPINE 3VWS: CPT

## 2022-04-18 PROCEDURE — 99284 EMERGENCY DEPT VISIT MOD MDM: CPT

## 2022-04-18 PROCEDURE — 6370000000 HC RX 637 (ALT 250 FOR IP): Performed by: STUDENT IN AN ORGANIZED HEALTH CARE EDUCATION/TRAINING PROGRAM

## 2022-04-18 PROCEDURE — 72100 X-RAY EXAM L-S SPINE 2/3 VWS: CPT

## 2022-04-18 RX ORDER — LIDOCAINE 50 MG/G
1 PATCH TOPICAL DAILY
Qty: 30 PATCH | Refills: 0 | Status: SHIPPED | OUTPATIENT
Start: 2022-04-18

## 2022-04-18 RX ORDER — LIDOCAINE 4 G/G
1 PATCH TOPICAL DAILY
Status: DISCONTINUED | OUTPATIENT
Start: 2022-04-19 | End: 2022-04-18

## 2022-04-18 RX ORDER — METHOCARBAMOL 500 MG/1
500 TABLET, FILM COATED ORAL 4 TIMES DAILY
Qty: 40 TABLET | Refills: 0 | Status: SHIPPED | OUTPATIENT
Start: 2022-04-18 | End: 2022-04-28

## 2022-04-18 RX ORDER — LIDOCAINE 4 G/G
3 PATCH TOPICAL DAILY
Status: DISCONTINUED | OUTPATIENT
Start: 2022-04-18 | End: 2022-04-18 | Stop reason: HOSPADM

## 2022-04-18 RX ORDER — METHOCARBAMOL 500 MG/1
500 TABLET, FILM COATED ORAL 4 TIMES DAILY
Status: DISCONTINUED | OUTPATIENT
Start: 2022-04-19 | End: 2022-04-18

## 2022-04-18 RX ORDER — METHOCARBAMOL 500 MG/1
500 TABLET, FILM COATED ORAL ONCE
Status: COMPLETED | OUTPATIENT
Start: 2022-04-18 | End: 2022-04-18

## 2022-04-18 RX ADMIN — METHOCARBAMOL 500 MG: 500 TABLET ORAL at 18:33

## 2022-04-18 ASSESSMENT — PAIN DESCRIPTION - ORIENTATION: ORIENTATION: RIGHT

## 2022-04-18 ASSESSMENT — PAIN SCALES - GENERAL
PAINLEVEL_OUTOF10: 10
PAINLEVEL_OUTOF10: 10

## 2022-04-18 ASSESSMENT — PAIN DESCRIPTION - LOCATION: LOCATION: BUTTOCKS;HIP

## 2022-04-18 ASSESSMENT — PAIN DESCRIPTION - PAIN TYPE: TYPE: ACUTE PAIN

## 2022-04-18 ASSESSMENT — PAIN DESCRIPTION - DIRECTION: RADIATING_TOWARDS: DOWN RIGHT LEG

## 2022-04-18 ASSESSMENT — PAIN - FUNCTIONAL ASSESSMENT: PAIN_FUNCTIONAL_ASSESSMENT: 0-10

## 2022-04-18 NOTE — ED PROVIDER NOTES
[address] ENCOUNTER          EM RESIDENT NOTE       Date of evaluation: 4/18/2022    Chief Complaint     Hip Pain (pin right hip buttock area down right leg)      History of Present Illness     Marylu Mack is a 67 y.o. 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 He has a history of sciatica, by chart review, primarily left-sided presents today with right-sided similar symptoms. He has a long history of back pain reports that this is similar in nature to his prior back pain, however has a worsening over the last couple days in the setting of weather change which is typical trigger for him. He takes Percocet and Valium at home which is not improved his pain at this time. There is no position that improves it. He has had no unintentional weight loss, dysuria, hematuria, diarrhea or constipation. He has had no numbness tingling or weakness lower extremities and has been able to ambulate but has some difficulty secondary to the underlying pain that radiates from his low back through his posterior right buttock and over to the anterior thigh. He has not had imaging in many years. He has previously had a steroid injection approximately 4 months ago that significantly improved his pain but he is unable to get in for his next appointment for a few months yet. No difficulty urinating, incontinence, retention, or stool incontinence. Review of Systems     A complete review of systems was performed and is otherwise negative. Past Medical, Surgical, Family, and Social History     He has a past medical history of Hypertension, Low back pain, and Neck fracture ([facility] [address] He has a past surgical history that includes Total hip arthroplasty; hip surgery; Neck surgery; fracture surgery; and pacemaker placement.   His family history includes Cancer in his father and mother; High Blood Pressure in his father. He reports that he quit smoking about 2 years ago. His smoking use included cigarettes. He has a 7.50 pack-year smoking history. He has never used smokeless tobacco. He reports that he does not drink alcohol and does not use drugs. Medications     Previous Medications    DIAZEPAM (VALIUM) 10 MG TABLET    TAKE 1 TABLET BY MOUTH EVERY 12 HOURS AS NEEDED FOR ANXIETY FOR UP TO 30 DAYS    DICLOFENAC SODIUM (VOLTAREN) 1 % GEL    APPLY 4 G TOPICALLY 4 TIMES DAILY    LORATADINE (CLARITIN) 10 MG TABLET    Take 1 tablet by mouth daily    METOPROLOL SUCCINATE (TOPROL XL) 50 MG EXTENDED RELEASE TABLET    Take 1 tablet by mouth daily    MIRTAZAPINE (REMERON) 15 MG TABLET    Take 1 tablet by mouth nightly    NONFORMULARY    Indications: using otc \"vapor\" inhaler; can't read label     OXYCODONE-ACETAMINOPHEN (PERCOCET) 5-325 MG PER TABLET    Take 1 tablet by mouth 3 times daily as needed for Pain . TADALAFIL (CIALIS) 5 MG TABLET    Take 1 tablet by mouth daily as needed for Erectile Dysfunction       Allergies     He has No Known Allergies. Physical Exam     INITIAL VITALS: BP: 126/75, Temp: 97.5 °F (36.4 °C), Pulse: 91, Resp: 18, SpO2: 98 %      General:  Well appearing. No acute distress    Eyes:  Pupils equal, round, reactive to light. No discharge from eyes    ENT:  No discharge from nose. OP clear   Neck:  Supple, trachea midline   Pulmonary:   Non-labored breathing. Breath sounds clear bilaterally   Cardiac:  Regular rate and rhythm. No rubs, murmurs, gallops   Abdomen:  Soft. Non-tender. Non-distended. Musculoskeletal:  No long bone deformity. No tenderness to palpation throughout the C, T, L-spine and no bony step-offs or deformities   Vascular:  Extremities warm and perfused. Normal pulses in all 4 extremities   Skin:  Dry, no rashes   Extremities:  No peripheral edema in bilateral lower extremities   Neuro:  Alert and oriented x 4. 5/5 strength in all 4 extremities.  Sensation grossly intact to light touch in upper and lower extremities. Speech and mentation normal. Gait narrow and stable. Positive straight leg raise on the right lower    DiagnosticResults     EKG   Interpreted in conjunction with emergencydepartment physician No att. providers found     RADIOLOGY:  XR LUMBAR SPINE (2-3 VIEWS)    (Results Pending)   XR THORACIC SPINE (3 VIEWS)    (Results Pending)       LABS:   No results found for this visit on 04/18/22. ED BEDSIDE ULTRASOUND:   n/a    RECENT VITALS:  BP: (!) 159/104, Temp: 97.5 °F (36.4 °C), Pulse: 84,Resp: 22, SpO2: 98 %     Procedures      n/a    ED Course     Nursing Notes, Past Medical Hx, Past Surgical Hx, Social Hx, Allergies, and Family Hx were reviewed. The patient was given the followingmedications:  Orders Placed This Encounter   Medications    lidocaine 4 % external patch 3 patch    methocarbamol (ROBAXIN) tablet 500 mg       CONSULTS:  None    MEDICAL DECISION MAKING / ASSESSMENT / Pawan Aida. is a 67 y.o. male presenting for evaluation of low back pain, with sciatic pain worsening on the right buttock. He has a positive straight leg raise, which is suspicious for spondylosis or radiculopathy, low suspicion for fracture, bony metastases given absence of midline pain, unlikely discitis or spinal osteomyelitis given absence of recent fevers or chills, no recent history of bacteremia, no history of IV drug use. He has no bowel or bladder symptoms suggestive of cauda equina syndrome at this time. Was provided Lidoderm patches and muscle relaxer, and given significant interval from prior imaging, obtained x-rays of the T and L-spine which is pending at time of signout. Was referred to physical therapy, provided muscle relaxer and Lidoderm patches, and will likely discharge in stable condition with follow-up with orthopedics for potential repeat steroid injection. This patient was also evaluated by the attending physician.  All care plans werediscussed and agreed upon. ED Course as of 04/18/22 1909 Mon Apr 18, 2022   1754 BP: 126/75 [LG]   1754 Pulse: 91 [LG]   1754 Resp: 18 [LG]   1754 SpO2: 98 % [LG]   1754 Temp: 97.5 °F (36.4 °C) [LG]      ED Course User Index  [LG] Susan Keating MD         Clinical Impression     1. Sciatica of right side        Disposition     PATIENT REFERRED TO:  Consuelo Cummings MD  [facility] [address] [place] [address]  [phone]    In 1 week    Signout to Dr. Irina Fitzgerald for further care at shift change.      DISCHARGE MEDICATIONS:  New Prescriptions    No medications on file       Chris Barreto MD    PGY-2, [facility]   Emergency Medicine       Susan Keating MD  Resident  04/18/22 1910

## 2022-04-18 NOTE — DISCHARGE INSTR - COC
Continuity of Care Form    Patient Name: Romi Hyde :  1950  MRN:  [id number]    Admit date:  2022  Discharge date:  ***    Code Status Order: Prior   Advance Directives:      Admitting Physician:  No admitting provider for patient encounter.   PCP: Marge Azevedo MD    Discharging Nurse: [facility] Unit/Room#: A08/A08-08  Discharging Unit Phone Number: ***    Emergency Contact:   Extended Emergency Contact Information  Primary Emergency Contact: Beatrice Maxwell  Home Phone: [phone]  Relation: Brother/Sister  Secondary Emergency Contact: Beatrice Maxwell  Home Phone: [phone]  Relation: Brother/Sister    Past Surgical History:  Past Surgical History:   Procedure Laterality Date    FRACTURE SURGERY      HIP SURGERY      left    NECK SURGERY      PACEMAKER PLACEMENT      TOTAL HIP ARTHROPLASTY      right       Immunization History:   Immunization History   Administered Date(s) Administered    Influenza Virus Vaccine 2011, 2015    Influenza, High Dose (Fluzone 65 yrs and older) 2014, 2017, 01/15/2019, 2020    Pneumococcal Conjugate 13-valent (Burnadette Ramirez) 05/10/2017    Pneumococcal Polysaccharide (Kwebhejxi12) 2009, 10/14/2014       Active Problems:  Patient Active Problem List   Diagnosis Code    Hypertension I10    Low back pain M54.50    Neck pain M54.2    Hand pain, right M79.641    Insomnia G47.00    Neuropathy G62.9    Elevated LFTs R79.89    DDD (degenerative disc disease), lumbosacral M51.37    Acute renal failure (ARF) ([facility]) N17.9    Pacemaker Z95.0    Fever R50.9    Phlebitis of right arm I80.8    Bradycardia R00.1    CHB (complete heart block) ([facility]) I44.2    Vitamin B12 deficiency E53.8    Non-seasonal allergic rhinitis J30.89    Appetite absent R63.0       Isolation/Infection:   Isolation            No Isolation          Patient Infection Status       Infection Onset Added Last Indicated Last Indicated By Review Planned Expiration Resolved Resolved By    None active    Resolved    C-diff Rule Out  19 Clostridium difficile toxin/antigen (Ordered)   20 Fátima Martinez RN            Nurse Assessment:  Last Vital Signs: /75   Pulse 91   Temp 97.5 °F (36.4 °C) (Oral)   Resp 18   SpO2 98%     Last documented pain score (0-10 scale): Pain Level: 10  Last Weight:   Wt Readings from Last 1 Encounters:   22 152 lb 9.6 oz (69.2 kg)     Mental Status:  {IP PT MENTAL STATUS:}    IV Access:  { TOYA IV ACCESS:759740833}    Nursing Mobility/ADLs:  Walking   {CHP DME ROLU:003994686}  Transfer  {CHP DME EZMB:964357241}  Bathing  {CHP DME XBCK:360501717}  Dressing  {CHP DME MVFI:146474366}  Toileting  {CHP DME IVJV:167663618}  Feeding  {P DME LBLS:968792774}  Med Admin  {P DME WDOP:445390091}  Med Delivery   { TOYA MED Delivery:622899049}    Wound Care Documentation and Therapy:  Wound 20 Arm Right;Mid;Medial partial thick opening and flattening blisters (Active)   Number of days: 837        Elimination:  Continence: Bowel: {YES / SH:22094}  Bladder: {YES / IM:}  Urinary Catheter: {Urinary Catheter:258062528}   Colostomy/Ileostomy/Ileal Conduit: {YES / NQ:84464}       Date of Last BM: ***  No intake or output data in the 24 hours ending 22 1755  No intake/output data recorded.     Safety Concerns:     [employer] Safety Concerns:147113962}    Impairments/Disabilities:      [employer] Impairments/Disabilities:647732039}    Nutrition Therapy:  Current Nutrition Therapy:   [employer] Diet List:042431345}    Routes of Feeding: {CHP DME Other Feedings:702304246}  Liquids: {Slp liquid thickness:63827}  Daily Fluid Restriction: {CHP DME Yes amt example:319311052}  Last Modified Barium Swallow with Video (Video Swallowing Test): {Done Not Done PIN}    Treatments at the Time of Hospital Discharge:   Respiratory Treatments: ***  Oxygen Therapy:  {Therapy; copd oxygen:02698}  Ventilator:    { CC Vent LNYO:272849963}    Rehab Therapies: {THERAPEUTIC INTERVENTION:3349321308}  Weight Bearing Status/Restrictions: 50Aamir Bond CC Weight Bearin}  Other Medical Equipment (for information only, NOT a DME order):  {EQUIPMENT:984329474}  Other Treatments: ***    Patient's personal belongings (please select all that are sent with patient):  {CHP DME Belongings:477934898}    RN SIGNATURE:  {Esignature:759859792}    CASE MANAGEMENT/SOCIAL WORK SECTION    Inpatient Status Date: ***    Readmission Risk Assessment Score:  Readmission Risk              Risk of Unplanned Readmission:  0           Discharging to Facility/ Agency   Name:   Address:  Phone:  Fax:    Dialysis Facility (if applicable)   Name:  Address:  Dialysis Schedule:  Phone:  Fax:    / signature: {Esignature:738173950}    PHYSICIAN SECTION    Prognosis: {Prognosis:7486833122}    Condition at Discharge: 50Aamir Bond Patient Condition:722298845}    Rehab Potential (if transferring to Rehab): {Prognosis:3690147639}    Recommended Labs or Other Treatments After Discharge: ***    Physician Certification: I certify the above information and transfer of Yunier Ji.  is necessary for the continuing treatment of the diagnosis listed and that he requires {Admit to Appropriate Level of Care:50229} for {GREATER/LESS:841143421} 30 days.      Update Admission H&P: {CHP DME Changes in KDU:835904655}    PHYSICIAN SIGNATURE:  {Esignature:650818449}

## 2022-04-18 NOTE — ED PROVIDER NOTES
[facility] RESIDENT NOTE     Date of evaluation: 2022    ADDENDUM:      Care of this patient was assumed from Edward Calderon MD.  The patient was seen for Hip Pain (pin right hip buttock area down right leg)  . The patient's initial evaluation and plan have been discussed with the prior provider who initially evaluated the patient. Nursing Notes, Past Medical Hx, Past Surgical Hx, Social Hx, Allergies, and Family Hx were all reviewed. PENDING RESPONSIBILITES:      Care of this patient was signed out to me. At the time of turnover the following steps in the patient's evaluation were pendin. Follow up XR Thoracic and Lumbar Spine  2. Reassess pain control  3. Disposition    PHYSICAL EXAM     Vitals:    22 1625 22 1832   BP: 126/75 (!) 159/104   Pulse: 91 84   Resp:    Temp: 97.5 °F (36.4 °C)    TempSrc: Oral    SpO2: 98% 98%       General:  Well appearing. No acute distress   Eyes:  Pupils equal, round, reactive to light. No discharge from eyes   ENT:  No discharge from nose. OP clear  Neck:  Supple, trachea midline  Pulmonary:   Non-labored breathing. Breath sounds clear bilaterally  Cardiac:  Regular rate and rhythm. No rubs, murmurs, gallops  Abdomen:  Soft. Non-tender. Non-distended. Musculoskeletal:  No long bone deformity. No C, T, L-spine and no bony step-offs or deformities  Vascular:  Extremities warm and perfused. Normal pulses in all 4 extremities  Skin:  Dry, no rashes  Extremities:  No peripheral edema in bilateral lower extremities  Neuro:  Alert. Moves all four extremities to command. No focal deficit  Neuro:  Alert and oriented x 4. CN II-XII intact. 5/5 strength in all 4 extremities. Positive straight leg raise of RLE. Sensation grossly intact to light touch in upper and lower extremities. Speech and mentation normal.    Diagnostic Results       RADIOLOGY:  XR THORACIC SPINE (3 VIEWS)   Final Result      1. patient's questions were answered satisfactorily, and he was subsequently sent home in stable condition. Clinical Impression     1. Sciatica of right side        Disposition     PATIENT REFERRED TO:  Vince Vences MD  [address] [place] [address]    In 1 week      [address] [address]  [phone]  Schedule an appointment as soon as possible for a visit         DISCHARGE MEDICATIONS:  New Prescriptions    No medications on file       Crystal Meier MD  Resident  04/18/22 1343

## 2022-04-18 NOTE — ED PROVIDER NOTES
ED Attending Attestation Note     Date of evaluation: 4/18/2022    This patient was seen by the resident. I have seen and examined the patient, agree with the workup, evaluation, management and diagnosis. The care plan has been discussed. My assessment reveals a 65-year-old male patient with a longstanding history of back pain with previously been getting cortisone injections. He is on multiple medications including Percocet, Valium, and has had poor side effects from steroids in the past.  Physical exam is consistent with a right lower extremity radiculopathy however no red flags concerning for cord compression or cauda equina. Chauncey Capps MD  04/18/22 9735

## 2022-04-19 ENCOUNTER — NURSE ONLY (OUTPATIENT)
Dept: CARDIOLOGY CLINIC | Age: 72
End: 2022-04-19
Payer: MEDICARE

## 2022-04-19 DIAGNOSIS — I44.2 CHB (COMPLETE HEART BLOCK) (HCC): ICD-10-CM

## 2022-04-19 DIAGNOSIS — Z95.0 PACEMAKER: ICD-10-CM

## 2022-04-19 PROCEDURE — 93294 REM INTERROG EVL PM/LDLS PM: CPT | Performed by: INTERNAL MEDICINE

## 2022-04-19 PROCEDURE — 93296 REM INTERROG EVL PM/IDS: CPT | Performed by: INTERNAL MEDICINE

## 2022-04-19 NOTE — PROGRESS NOTES
We received remote transmission from patient's dual chamber pacemaker monitor at home. Transmission shows normal sensing and pacing function. NSVT noted (Toprol XL). Ap 0.2%   <0.1%  Echo 01.2020 showed EF of 55-60%. EP physician will review. See interrogation under cardiology tab in the [address] [place] [address] [address] field for more details. Will continue to monitor remotely.

## 2022-04-25 ENCOUNTER — OFFICE VISIT (OUTPATIENT)
Dept: INTERNAL MEDICINE CLINIC | Age: 72
End: 2022-04-25
Payer: MEDICARE

## 2022-04-25 VITALS
HEIGHT: 71 IN | WEIGHT: 148.8 LBS | HEART RATE: 111 BPM | TEMPERATURE: 97.2 F | BODY MASS INDEX: 20.83 KG/M2 | DIASTOLIC BLOOD PRESSURE: 104 MMHG | OXYGEN SATURATION: 98 % | SYSTOLIC BLOOD PRESSURE: 147 MMHG

## 2022-04-25 DIAGNOSIS — F41.1 GENERALIZED ANXIETY DISORDER: ICD-10-CM

## 2022-04-25 DIAGNOSIS — M51.37 DDD (DEGENERATIVE DISC DISEASE), LUMBOSACRAL: Primary | ICD-10-CM

## 2022-04-25 DIAGNOSIS — R63.0 APPETITE ABSENT: ICD-10-CM

## 2022-04-25 PROCEDURE — 99213 OFFICE O/P EST LOW 20 MIN: CPT | Performed by: STUDENT IN AN ORGANIZED HEALTH CARE EDUCATION/TRAINING PROGRAM

## 2022-04-25 PROCEDURE — 6360000002 HC RX W HCPCS: Performed by: STUDENT IN AN ORGANIZED HEALTH CARE EDUCATION/TRAINING PROGRAM

## 2022-04-25 PROCEDURE — 96372 THER/PROPH/DIAG INJ SC/IM: CPT

## 2022-04-25 RX ORDER — DIAZEPAM 10 MG/1
10 TABLET ORAL EVERY 12 HOURS PRN
Qty: 60 TABLET | Refills: 0 | Status: SHIPPED | OUTPATIENT
Start: 2022-04-25 | End: 2022-05-25

## 2022-04-25 RX ORDER — METOPROLOL SUCCINATE 50 MG/1
50 TABLET, EXTENDED RELEASE ORAL DAILY
Qty: 30 TABLET | Refills: 5 | Status: SHIPPED | OUTPATIENT
Start: 2022-04-25

## 2022-04-25 RX ORDER — KETOROLAC TROMETHAMINE 30 MG/ML
15 INJECTION, SOLUTION INTRAMUSCULAR; INTRAVENOUS ONCE
Status: COMPLETED | OUTPATIENT
Start: 2022-04-25 | End: 2022-04-25

## 2022-04-25 RX ORDER — KETOROLAC TROMETHAMINE 15 MG/ML
15 INJECTION, SOLUTION INTRAMUSCULAR; INTRAVENOUS ONCE
Qty: 1 ML | Refills: 0
Start: 2022-04-25 | End: 2022-04-25 | Stop reason: CLARIF

## 2022-04-25 RX ORDER — TRIAMCINOLONE ACETONIDE 40 MG/ML
40 INJECTION, SUSPENSION INTRA-ARTICULAR; INTRAMUSCULAR ONCE
Status: COMPLETED | OUTPATIENT
Start: 2022-04-25 | End: 2022-04-25

## 2022-04-25 RX ADMIN — TRIAMCINOLONE ACETONIDE 40 MG: 40 INJECTION, SUSPENSION INTRA-ARTICULAR; INTRAMUSCULAR at 15:41

## 2022-04-25 RX ADMIN — KETOROLAC TROMETHAMINE 15 MG: 30 INJECTION, SOLUTION INTRAMUSCULAR; INTRAVENOUS at 15:41

## 2022-04-25 ASSESSMENT — VISUAL ACUITY: OU: 1

## 2022-04-25 NOTE — PATIENT INSTRUCTIONS
- Ensure you are taking mirtazapine as prescribed nightly. - Bring you medication bottles with you to your next appointment  - Take all you medications as prescribed  - Follow-up with orthopedic surgery as scheduled. Discuss pain management with them.   - Follow-up in 3 months

## 2022-04-25 NOTE — PROGRESS NOTES
2022    Patient's Name: Yunier Ji. : 1950)    CC: Routine follow-up. HPI: 43-year-old male here for intramuscular injection for extensive OA and DDD pain. Patient reports he is in so much pain he is not sure he will be able to tolerate the pain from now until Friday when he sees orthopedic surgery. He follows orthopedic surgery for pain management of his chronic OA and DDD. He rates the pain as a 10 out of 10 currently. The pain is a stabbing pain worst in his right hip. Nothing makes it better and any movement makes it worse. He currently walks without cane and sits with his right leg stretched out to lessen the pain. Patient reports his anxiety is worsened secondary to the pain. He continues to have lack of appetite. When asked about mirtazapine, patient reports he is not sure he ever picked up the medication from the pharmacy. He denies any other symptoms including F/C, URI sx dizziness, fatigue, palpitations, CP, SOB, abd pain, N/V/D/C, acute joint pain, BRBPR, melena and  sx.     Review of Systems   Noted in HPI above    Prior to Visit Medications    Medication Sig Taking? Authorizing Provider   metoprolol succinate (TOPROL XL) 50 MG extended release tablet Take 1 tablet by mouth daily Yes Geovany Castellanos MD   diazePAM (VALIUM) 10 MG tablet Take 1 tablet by mouth every 12 hours as needed for Anxiety for up to 30 days. Yes Geovany Castellanos MD   lidocaine (LIDODERM) 5 % Place 1 patch onto the skin daily 12 hours on, 12 hours off.  Yes Ellyn Wilson MD   methocarbamol (ROBAXIN) 500 MG tablet Take 1 tablet by mouth 4 times daily for 10 days Yes Ellyn Wilson MD   mirtazapine (REMERON) 15 MG tablet Take 1 tablet by mouth nightly Yes Ursula Varma MD   loratadine (CLARITIN) 10 MG tablet Take 1 tablet by mouth daily Yes Ever Patel MD   tadalafil (CIALIS) 5 MG tablet Take 1 tablet by mouth daily as needed for Erectile Dysfunction Yes Ever Patel MD   diclofenac sodium (VOLTAREN) 1 % GEL APPLY 4 G TOPICALLY 4 TIMES DAILY Yes Eros Sinha MD   oxyCODONE-acetaminophen (PERCOCET) 5-325 MG per tablet Take 1 tablet by mouth 3 times daily as needed for Pain . Yes Historical Provider, MD   NONFORMULARY Indications: using otc \"vapor\" inhaler; can't read label   Historical Provider, MD        PHYSICAL EXAM    Vitals:    04/25/22 1502   BP: (!) 147/104   Site: Right Upper Arm   Position: Sitting   Cuff Size: Medium Adult   Pulse: 111   Temp: 97.2 °F (36.2 °C)   TempSrc: Temporal   SpO2: 98%   Weight: 148 lb 12.8 oz (67.5 kg)   Height: 5' 11\" (1.803 m)      Estimated body mass index is 20.75 kg/m² as calculated from the following:    Height as of this encounter: 5' 11\" (1.803 m). Weight as of this encounter: 148 lb 12.8 oz (67.5 kg). Physical Exam  Constitutional:       General: He is not in acute distress. Appearance: Normal appearance. He is not ill-appearing. HENT:      Head: Normocephalic and atraumatic. Eyes:      General: Vision grossly intact. Conjunctiva/sclera: Conjunctivae normal.   Cardiovascular:      Rate and Rhythm: Normal rate and regular rhythm. Pulses: Normal pulses. Heart sounds: Normal heart sounds, S1 normal and S2 normal. No murmur heard. No friction rub. No gallop. Pulmonary:      Effort: Pulmonary effort is normal. No respiratory distress. Breath sounds: Normal breath sounds and air entry. No wheezing or rales. Abdominal:      General: Bowel sounds are normal. There is no distension. Palpations: Abdomen is soft. Tenderness: There is no abdominal tenderness. Musculoskeletal:      Cervical back: Full passive range of motion without pain, normal range of motion and neck supple. Right lower leg: No edema. Left lower leg: No edema. Comments: Gait and range of motion examination deferred secondary to pain   Skin:     Capillary Refill: Capillary refill takes less than 2 seconds.       Coloration: Skin is not pale. Neurological:      Mental Status: He is alert and oriented to person, place, and time. Mental status is at baseline. Psychiatric:         Mood and Affect: Mood normal.         Speech: Speech normal.         Judgment: Judgment normal.          ASSESSMENT AND PLAN    1. DDD (degenerative disc disease), lumbosacral  IM pain injections given during visit to bridge patient to orthopedic appointment  - triamcinolone acetonide (KENALOG-40) injection 40 mg  - ketorolac (TORADOL) injection 15 mg    2. Appetite absent  - Patient encouraged to  Remeron and start taking  - Patient encouraged to bring all his pill bottles to his next clinic appointment    3. Generalized anxiety disorder  - Refill- diazePAM (VALIUM) 10 MG tablet; Take 1 tablet by mouth every 12 hours as needed for Anxiety for up to 30 days. Dispense: 60 tablet; Refill: 0       Return in about 3 months (around 7/25/2022).      Patient discussed to stop with Dr. De Paz Files    Electronically signed by Maral Ly MD on 4/25/2022 at 4:53 PM

## 2022-06-24 DIAGNOSIS — I10 ESSENTIAL HYPERTENSION: ICD-10-CM

## 2022-06-24 RX ORDER — METOPROLOL SUCCINATE 50 MG/1
50 TABLET, EXTENDED RELEASE ORAL DAILY
Qty: 30 TABLET | Refills: 2 | Status: SHIPPED | OUTPATIENT
Start: 2022-06-24 | End: 2022-07-25 | Stop reason: SDUPTHER

## 2022-07-11 NOTE — PROGRESS NOTES
inhaler; can't read label  (Patient not taking: No sig reported)       No current facility-administered medications on file prior to visit. Past Medical History:   Diagnosis Date    Hypertension 5/18/2010    Low back pain 5/18/2010    Neck fracture [facility])         Past Surgical History:   Procedure Laterality Date    FRACTURE SURGERY      HIP SURGERY      left    NECK SURGERY      PACEMAKER PLACEMENT      TOTAL HIP ARTHROPLASTY      right       Family History:  Reviewed. family history includes Cancer in his father and mother; High Blood Pressure in his father. Review of System:    · Constitutional: No fevers, chills. · Eyes: No visual changes or diplopia. No scleral icterus. · ENT: No Headaches. No mouth sores or sore throat. · Cardiovascular: No for chest pain, No for dyspnea on exertion, No for palpitations or No for loss of consciousness. No cough, hemoptysis, No for pleuritic pain, or phlebitis. · Respiratory: No for cough or wheezing. No hematemesis. · Gastrointestinal: No abdominal pain, blood in stools. · Genitourinary: No dysuria, or hematuria. · Musculoskeletal: No gait disturbance,    · Integumentary: No rash or pruritis. · Neurological: No headache, change in muscle strength, numbness or tingling. · Psychiatric: No anxiety, or depression. · Endocrine: No temperature intolerance. No excessive thirst, fluid intake, or urination. · Hem/Lymph: No abnormal bruising or bleeding, blood clots or swollen lymph nodes. · Allergic/Immunologic: No nasal congestion or hives. Physical Examination:  Vitals:    07/12/22 1432   Pulse: 89         Wt Readings from Last 3 Encounters:   07/12/22 146 lb 3.2 oz (66.3 kg)   04/25/22 148 lb 12.8 oz (67.5 kg)   02/09/22 152 lb 9.6 oz (69.2 kg)       · Constitutional: Oriented. No distress. · Head: Normocephalic and atraumatic. · Mouth/Throat: Oropharynx is clear and moist.   · Eyes: Conjunctivae clear without jaunduice. PERRL.   · Neck: Neck supple. No rigidity. No JVD present. · Cardiovascular: Normal rate, regular rhythm, S1&S2. · Pulmonary/Chest: Bilateral respiratory sounds. No wheezes, No rhonchi. · Abdominal: Soft. Bowel sounds present. No distension, No tenderness. · Musculoskeletal: No tenderness. No edema    · Lymphadenopathy: Has no cervical adenopathy. · Neurological: Alert and oriented. Cranial nerve appears intact, No Gross deficit   · Skin: Skin is warm and dry. No rash noted. · Psychiatric: Has a normal mood, affect and behavior     Labs:  Reviewed. No results for input(s): NA, K, CL, CO2, PHOS, BUN, CREATININE, CA in the last 72 hours. Invalid input(s):  TSH  No results for input(s): WBC, HGB, HCT, MCV, PLT in the last 72 hours. Lab Results   Component Value Date/Time    CKTOTAL 77 10/20/2013 10:38 AM    TROPONINI <0.01 12/29/2019 06:59 PM     No results found for: BNP  No results found for: PROTIME, INR  Lab Results   Component Value Date/Time    CHOL 131 04/16/2019 03:03 PM    HDL 32 04/16/2019 03:03 PM    HDL 33 08/25/2011 02:05 PM    TRIG 497 04/16/2019 03:03 PM       ECG: Personally reviewed: NSR, HR 89, , QRS 90, QTc 400    ECHO:  1/2020     Conclusions      Summary   Left ventricular cavity size is normal with mild concentric left ventricular   hypertrophy. Left ventricular function is normal with ejection fraction estimated at   55-60%. Aortic valve appears slightly thickened/calcified but opens adequately. Mild tricuspid regurgitation. Estimated pulmonary artery systolic pressure is at 32 mmHg assuming a right   atrial pressure of 3 mmHg.     Stress Test: n/a    Cardiac Angiography: n/a    Problem List:   Patient Active Problem List    Diagnosis Date Noted    Appetite absent 01/26/2022    CHB (complete heart block) ([facility] [address] 10/27/2021    Vitamin B12 deficiency 10/27/2021    Non-seasonal allergic rhinitis 10/27/2021    Bradycardia 03/05/2020    Fever 01/06/2020    Phlebitis of right arm

## 2022-07-12 ENCOUNTER — OFFICE VISIT (OUTPATIENT)
Dept: CARDIOLOGY CLINIC | Age: 72
End: 2022-07-12
Payer: MEDICARE

## 2022-07-12 ENCOUNTER — NURSE ONLY (OUTPATIENT)
Dept: CARDIOLOGY CLINIC | Age: 72
End: 2022-07-12
Payer: MEDICARE

## 2022-07-12 VITALS
HEART RATE: 89 BPM | BODY MASS INDEX: 20.39 KG/M2 | SYSTOLIC BLOOD PRESSURE: 126 MMHG | WEIGHT: 146.2 LBS | DIASTOLIC BLOOD PRESSURE: 74 MMHG

## 2022-07-12 DIAGNOSIS — R00.1 BRADYCARDIA: ICD-10-CM

## 2022-07-12 DIAGNOSIS — Z95.0 PACEMAKER: ICD-10-CM

## 2022-07-12 DIAGNOSIS — I47.29 NSVT (NONSUSTAINED VENTRICULAR TACHYCARDIA): ICD-10-CM

## 2022-07-12 DIAGNOSIS — I44.2 CHB (COMPLETE HEART BLOCK) (HCC): ICD-10-CM

## 2022-07-12 DIAGNOSIS — I10 PRIMARY HYPERTENSION: ICD-10-CM

## 2022-07-12 DIAGNOSIS — I47.1 SVT (SUPRAVENTRICULAR TACHYCARDIA) (HCC): ICD-10-CM

## 2022-07-12 DIAGNOSIS — I47.1 ATRIAL TACHYCARDIA (HCC): ICD-10-CM

## 2022-07-12 DIAGNOSIS — I44.2 CHB (COMPLETE HEART BLOCK) (HCC): Primary | ICD-10-CM

## 2022-07-12 LAB
HCT VFR BLD CALC: 43.6 % (ref 40.5–52.5)
HEMOGLOBIN: 14.8 G/DL (ref 13.5–17.5)
MCH RBC QN AUTO: 33.9 PG (ref 26–34)
MCHC RBC AUTO-ENTMCNC: 34 G/DL (ref 31–36)
MCV RBC AUTO: 99.7 FL (ref 80–100)
PDW BLD-RTO: 13.7 % (ref 12.4–15.4)
PLATELET # BLD: 164 K/UL (ref 135–450)
PMV BLD AUTO: 11.3 FL (ref 5–10.5)
RBC # BLD: 4.37 M/UL (ref 4.2–5.9)
WBC # BLD: 6.3 K/UL (ref 4–11)

## 2022-07-12 PROCEDURE — 93280 PM DEVICE PROGR EVAL DUAL: CPT | Performed by: INTERNAL MEDICINE

## 2022-07-12 PROCEDURE — 1123F ACP DISCUSS/DSCN MKR DOCD: CPT | Performed by: NURSE PRACTITIONER

## 2022-07-12 PROCEDURE — G8420 CALC BMI NORM PARAMETERS: HCPCS | Performed by: NURSE PRACTITIONER

## 2022-07-12 PROCEDURE — 99214 OFFICE O/P EST MOD 30 MIN: CPT | Performed by: NURSE PRACTITIONER

## 2022-07-12 PROCEDURE — 3017F COLORECTAL CA SCREEN DOC REV: CPT | Performed by: NURSE PRACTITIONER

## 2022-07-12 PROCEDURE — G8427 DOCREV CUR MEDS BY ELIG CLIN: HCPCS | Performed by: NURSE PRACTITIONER

## 2022-07-12 PROCEDURE — 93000 ELECTROCARDIOGRAM COMPLETE: CPT | Performed by: NURSE PRACTITIONER

## 2022-07-12 PROCEDURE — 1036F TOBACCO NON-USER: CPT | Performed by: NURSE PRACTITIONER

## 2022-07-12 NOTE — PROGRESS NOTES
Patient comes in for programming evaluation on their Medtronic dual chamber pacemaker. Last remote 4.19.2022    All sensing and pacing parameters are within normal range. Battery life 12.4 years  AP 0.8%.  <0.1%. AT/AF burden <0.1%  PVC 0.7/hr  1 NSVT that appears 1:1 conduction pAT x 03 secs on 3.18.2022  Patient remains on metoprolol  Additional carelink alerts turned On. Please see interrogation for more detail. Patient will see NPWS today and follow up in 3 months in office or remotely.

## 2022-07-13 LAB
ANION GAP SERPL CALCULATED.3IONS-SCNC: 16 MMOL/L (ref 3–16)
BUN BLDV-MCNC: 35 MG/DL (ref 7–20)
CALCIUM SERPL-MCNC: 9.6 MG/DL (ref 8.3–10.6)
CHLORIDE BLD-SCNC: 108 MMOL/L (ref 99–110)
CO2: 21 MMOL/L (ref 21–32)
CREAT SERPL-MCNC: 1.7 MG/DL (ref 0.8–1.3)
GFR AFRICAN AMERICAN: 48
GFR NON-AFRICAN AMERICAN: 40
GLUCOSE BLD-MCNC: 99 MG/DL (ref 70–99)
POTASSIUM SERPL-SCNC: 4.1 MMOL/L (ref 3.5–5.1)
SODIUM BLD-SCNC: 145 MMOL/L (ref 136–145)

## 2022-07-25 ENCOUNTER — OFFICE VISIT (OUTPATIENT)
Dept: INTERNAL MEDICINE CLINIC | Age: 72
End: 2022-07-25
Payer: MEDICARE

## 2022-07-25 VITALS
BODY MASS INDEX: 20.24 KG/M2 | HEIGHT: 70 IN | RESPIRATION RATE: 20 BRPM | HEART RATE: 94 BPM | DIASTOLIC BLOOD PRESSURE: 78 MMHG | SYSTOLIC BLOOD PRESSURE: 129 MMHG | WEIGHT: 141.4 LBS | TEMPERATURE: 97.1 F | OXYGEN SATURATION: 98 %

## 2022-07-25 DIAGNOSIS — E53.8 VITAMIN B12 DEFICIENCY: ICD-10-CM

## 2022-07-25 DIAGNOSIS — J30.89 NON-SEASONAL ALLERGIC RHINITIS, UNSPECIFIED TRIGGER: ICD-10-CM

## 2022-07-25 DIAGNOSIS — K63.5 POLYP OF TRANSVERSE COLON, UNSPECIFIED TYPE: ICD-10-CM

## 2022-07-25 DIAGNOSIS — H61.23 BILATERAL IMPACTED CERUMEN: ICD-10-CM

## 2022-07-25 DIAGNOSIS — M17.0 PRIMARY OSTEOARTHRITIS OF BOTH KNEES: Primary | ICD-10-CM

## 2022-07-25 PROCEDURE — 99213 OFFICE O/P EST LOW 20 MIN: CPT | Performed by: STUDENT IN AN ORGANIZED HEALTH CARE EDUCATION/TRAINING PROGRAM

## 2022-07-25 PROCEDURE — 6360000002 HC RX W HCPCS: Performed by: STUDENT IN AN ORGANIZED HEALTH CARE EDUCATION/TRAINING PROGRAM

## 2022-07-25 PROCEDURE — 96372 THER/PROPH/DIAG INJ SC/IM: CPT

## 2022-07-25 RX ORDER — CYANOCOBALAMIN 1000 UG/ML
1000 INJECTION INTRAMUSCULAR; SUBCUTANEOUS ONCE
Status: COMPLETED | OUTPATIENT
Start: 2022-07-25 | End: 2022-07-25

## 2022-07-25 RX ORDER — TRIAMCINOLONE ACETONIDE 40 MG/ML
40 INJECTION, SUSPENSION INTRA-ARTICULAR; INTRAMUSCULAR ONCE
Status: COMPLETED | OUTPATIENT
Start: 2022-07-25 | End: 2022-07-25

## 2022-07-25 RX ORDER — LORATADINE 10 MG/1
10 TABLET ORAL DAILY
Qty: 90 TABLET | Refills: 1 | Status: SHIPPED | OUTPATIENT
Start: 2022-07-25

## 2022-07-25 RX ADMIN — CYANOCOBALAMIN 1000 MCG: 1000 INJECTION, SOLUTION INTRAMUSCULAR at 15:10

## 2022-07-25 RX ADMIN — TRIAMCINOLONE ACETONIDE 40 MG: 40 INJECTION, SUSPENSION INTRA-ARTICULAR; INTRAMUSCULAR at 15:08

## 2022-07-25 NOTE — PROGRESS NOTES
Outpatient Clinic Established Patient Note    Patient: Corky Vaughan. : 1950 (67 y.o.)  Date: 2022    CC: Follow up appointment    HPI:      Patient is a 79-year-old male with past medical history of CKD 4, HTN, anxiety, complete heart block s/p MDT PPM followed by electrophysiology, chronic bilateral OA of his knees, DDD, vitamin B12 deficiency requiring IM shots who presented to the clinic for follow-up appointment. Patient endorses that since he was last seen, he has been doing well. He did bump his toe and has already scheduled an appointment with podiatry to be seen. Patient was hoping to get a Kenalog shot and a vitamin B12 shot today. Patient denies any ever, chills, nausea, vomiting, chest pain, palpitations, abdominal pain, diarrhea or constipation, dysuria, urinary urgency or frequency. Home Meds:  Prior to Visit Medications    Medication Sig Taking? Authorizing Provider   metoprolol succinate (TOPROL XL) 50 MG extended release tablet Take 1 tablet by mouth daily  Cathleen Rodriguez MD   metoprolol succinate (TOPROL XL) 50 MG extended release tablet Take 1 tablet by mouth daily  Fabian Georges MD   lidocaine (LIDODERM) 5 % Place 1 patch onto the skin daily 12 hours on, 12 hours off. Roberto Wing MD   mirtazapine (REMERON) 15 MG tablet Take 1 tablet by mouth nightly  Lashon Wright MD   loratadine (CLARITIN) 10 MG tablet Take 1 tablet by mouth daily  Katherine Brito MD   tadalafil (CIALIS) 5 MG tablet Take 1 tablet by mouth daily as needed for Erectile Dysfunction  Patient not taking: Reported on 2022  Katherine Brito MD   NONFORMULARY Indications: using otc \"vapor\" inhaler; can't read label   Patient not taking: No sig reported  Historical Provider, MD   diclofenac sodium (VOLTAREN) 1 % GEL APPLY 4 G TOPICALLY 4 TIMES DAILY  Vaughn Arriaga MD   oxyCODONE-acetaminophen (PERCOCET) 5-325 MG per tablet Take 1 tablet by mouth 3 times daily as needed for Pain .   Historical Provider, MD       Allergies:    Patient has no known allergies. Health Maintenance Due   Topic Date Due    Annual Wellness Visit (AWV)  Never done    DTaP/Tdap/Td vaccine (1 - Tdap) Never done    AAA screen  02/11/2014    COVID-19 Vaccine (4 - Booster for [place] series) 04/22/2022       Immunization History   Administered Date(s) Administered    Influenza Virus Vaccine 09/16/2011, 09/21/2015    Influenza, High Dose (Fluzone 65 yrs and older) 12/11/2014, 12/27/2017, 01/15/2019, 01/23/2020    Pneumococcal Conjugate 13-valent (Vchogxu24) 05/10/2017    Pneumococcal Polysaccharide (Rskysomgb51) 12/14/2009, 10/14/2014       Review of Systems  A 10-organ Review Of Systems was obtained and otherwise unremarkable except as per HPI. Data: Old records have been reviewed electronically. PHYSICAL EXAM:  There were no vitals taken for this visit. Physical Exam  HENT:      Head: Normocephalic and atraumatic. Ears:      Comments: Presence of cerumen bilaterally, not completely impacted     Mouth/Throat:      Mouth: Mucous membranes are moist.   Eyes:      Pupils: Pupils are equal, round, and reactive to light. Cardiovascular:      Rate and Rhythm: Normal rate and regular rhythm. Pulses: Normal pulses. Heart sounds: Normal heart sounds. No murmur heard. No friction rub. No gallop. Pulmonary:      Effort: Pulmonary effort is normal. No respiratory distress. Breath sounds: Normal breath sounds. No wheezing or rales. Abdominal:      General: Bowel sounds are normal. There is no distension. Palpations: Abdomen is soft. Tenderness: There is no abdominal tenderness. There is no guarding. Musculoskeletal:         General: Normal range of motion. Right lower leg: No edema. Left lower leg: No edema. Neurological:      Mental Status: He is alert and oriented to person, place, and time. Assessment & Plan:      1.   Bilateral osteoarthritis of the knee  Patient normally gets IM injections of Toradol and Kenalog. Patient claims that lasted this time, has bilateral joint pains have worsened. He is hoping to get injections today. -Gave patient 40 mg of IM Kenalog     2. Degenerative disc disease  This problem is currently stable  -Patient endorses since having some back pain but not too severe. 3. Generalized anxiety disorder  This problem is currently stable  -Patient has Valium 10 mg twice daily as needed    4. HTN  This problem is currently stable. -BP in the office today was well controlled at 129/78  -Continue current management with metoprolol    5. Complete heart block s/p MDT PPM  This problem is currently stable  Patient follows with electrophysiology. He endorses he was last seen last week and has device check had no issues  -Continue to follow electrophysiology    6. Large transverse colon polyp  Patient had a colonoscopy and this was found on regular screening in 2015. He was supposed to follow-up within 3-6 months to remove remaining small polyp and reinspect the site of the large polyp that was removed. There is no documentation regarding follow-up that was done  -Provided referral to Dr. Karin Noriega for any needed evaluation    Health Maintenance     -COVID vaccine: Patient has received 3 doses  -Colonoscopy/Flex Sig (50-75): We will have patient follow-up with Dr. Karin Noriega  -Shingles (48): Already completed    Dispo: Pt has been staffed with Dr. Ramon Ferrera  _______________  Jm Nash MD, 7/25/2022 2:14 PM   PGY-3    This note was likely completed using voice recognition technology and may contain unintended errors. Addendum to Resident H& P/Progress note:  I have personally seen,examined and evaluated the patient.  I have reviewed the current history, physical findings, labs and assessment and plan and agree with note as documented by resident MD ( Rolly Puga)      Bryson Jang MD, [address]

## 2022-08-23 ENCOUNTER — NURSE ONLY (OUTPATIENT)
Dept: CARDIOLOGY CLINIC | Age: 72
End: 2022-08-23
Payer: MEDICARE

## 2022-08-23 DIAGNOSIS — R00.1 BRADYCARDIA: Primary | ICD-10-CM

## 2022-08-23 DIAGNOSIS — Z95.0 PACEMAKER: ICD-10-CM

## 2022-08-23 PROCEDURE — 93294 REM INTERROG EVL PM/LDLS PM: CPT | Performed by: INTERNAL MEDICINE

## 2022-08-23 PROCEDURE — 93296 REM INTERROG EVL PM/IDS: CPT | Performed by: INTERNAL MEDICINE

## 2022-08-23 NOTE — PROGRESS NOTES
We received remote transmission from patient's dual chamber pacemaker monitor at home. Transmission shows normal sensing and pacing function. No arrhythmias/ or events. Ap 0.7%   <0.1%  Echo 01.2020 showed EF of 55-60%. EP physician will review. See interrogation under cardiology tab in the [address] [place] [address] [address] field for more details. Will continue to monitor remotely. (End of 91-day monitoring period 8/23/22).

## 2022-10-17 ENCOUNTER — OFFICE VISIT (OUTPATIENT)
Dept: INTERNAL MEDICINE CLINIC | Age: 72
End: 2022-10-17
Payer: MEDICARE

## 2022-10-17 VITALS
RESPIRATION RATE: 16 BRPM | HEIGHT: 70 IN | SYSTOLIC BLOOD PRESSURE: 170 MMHG | WEIGHT: 146.2 LBS | OXYGEN SATURATION: 97 % | BODY MASS INDEX: 20.93 KG/M2 | DIASTOLIC BLOOD PRESSURE: 97 MMHG | HEART RATE: 83 BPM | TEMPERATURE: 96.9 F

## 2022-10-17 DIAGNOSIS — F41.1 GENERALIZED ANXIETY DISORDER: ICD-10-CM

## 2022-10-17 DIAGNOSIS — E53.8 VITAMIN B12 DEFICIENCY: ICD-10-CM

## 2022-10-17 DIAGNOSIS — M17.0 PRIMARY OSTEOARTHRITIS OF BOTH KNEES: Primary | ICD-10-CM

## 2022-10-17 DIAGNOSIS — I10 ESSENTIAL HYPERTENSION: ICD-10-CM

## 2022-10-17 PROBLEM — Z95.0 PACEMAKER: Status: RESOLVED | Noted: 2020-01-03 | Resolved: 2022-10-17

## 2022-10-17 PROBLEM — R00.1 BRADYCARDIA: Status: RESOLVED | Noted: 2020-03-05 | Resolved: 2022-10-17

## 2022-10-17 PROBLEM — J30.89 NON-SEASONAL ALLERGIC RHINITIS: Status: RESOLVED | Noted: 2021-10-27 | Resolved: 2022-10-17

## 2022-10-17 PROBLEM — R50.9 FEVER: Status: RESOLVED | Noted: 2020-01-06 | Resolved: 2022-10-17

## 2022-10-17 PROBLEM — N17.9 ACUTE RENAL FAILURE (ARF) (HCC): Status: RESOLVED | Noted: 2019-12-29 | Resolved: 2022-10-17

## 2022-10-17 PROBLEM — R63.0 APPETITE ABSENT: Status: RESOLVED | Noted: 2022-01-26 | Resolved: 2022-10-17

## 2022-10-17 PROBLEM — I80.8 PHLEBITIS OF RIGHT ARM: Status: RESOLVED | Noted: 2020-01-06 | Resolved: 2022-10-17

## 2022-10-17 PROBLEM — I44.2 CHB (COMPLETE HEART BLOCK) (HCC): Status: RESOLVED | Noted: 2021-10-27 | Resolved: 2022-10-17

## 2022-10-17 PROCEDURE — 99213 OFFICE O/P EST LOW 20 MIN: CPT | Performed by: STUDENT IN AN ORGANIZED HEALTH CARE EDUCATION/TRAINING PROGRAM

## 2022-10-17 PROCEDURE — 96372 THER/PROPH/DIAG INJ SC/IM: CPT

## 2022-10-17 PROCEDURE — 6360000002 HC RX W HCPCS: Performed by: STUDENT IN AN ORGANIZED HEALTH CARE EDUCATION/TRAINING PROGRAM

## 2022-10-17 PROCEDURE — 6360000002 HC RX W HCPCS: Performed by: INTERNAL MEDICINE

## 2022-10-17 RX ORDER — KETOROLAC TROMETHAMINE 30 MG/ML
30 INJECTION, SOLUTION INTRAMUSCULAR; INTRAVENOUS ONCE
Status: COMPLETED | OUTPATIENT
Start: 2022-10-17 | End: 2022-10-17

## 2022-10-17 RX ORDER — CYANOCOBALAMIN 1000 UG/ML
1000 INJECTION INTRAMUSCULAR; SUBCUTANEOUS ONCE
Status: COMPLETED | OUTPATIENT
Start: 2022-10-17 | End: 2022-10-17

## 2022-10-17 RX ORDER — DIAZEPAM 5 MG/1
10 TABLET ORAL EVERY 12 HOURS PRN
Qty: 30 TABLET | Refills: 0 | Status: SHIPPED | OUTPATIENT
Start: 2022-10-17 | End: 2022-11-17

## 2022-10-17 RX ORDER — ACETAMINOPHEN 325 MG/1
650 TABLET ORAL 2 TIMES DAILY
Qty: 360 TABLET | Refills: 1 | Status: SHIPPED | OUTPATIENT
Start: 2022-10-17 | End: 2023-01-15

## 2022-10-17 RX ORDER — CYANOCOBALAMIN 1000 UG/ML
1000 INJECTION INTRAMUSCULAR; SUBCUTANEOUS ONCE
Qty: 1 ML | Refills: 0 | Status: SHIPPED | OUTPATIENT
Start: 2022-10-17 | End: 2022-10-17 | Stop reason: SDUPTHER

## 2022-10-17 RX ADMIN — CYANOCOBALAMIN 1000 MCG: 1000 INJECTION, SOLUTION INTRAMUSCULAR at 15:25

## 2022-10-17 RX ADMIN — KETOROLAC TROMETHAMINE 30 MG: 30 INJECTION, SOLUTION INTRAMUSCULAR; INTRAVENOUS at 15:30

## 2022-10-17 NOTE — ASSESSMENT & PLAN NOTE
Patient normally gets B12 shots every 3 months and therefore came in today for that  -B12 shot 1000 mcg IM given today

## 2022-10-17 NOTE — ASSESSMENT & PLAN NOTE
Patient BP was elevated in the clinic today due to the fact that he is undergoing a lot of anxiety and his wife's been hospitalized and he has been under a lot of stress.  -Discussed managing anxiety though patient's wife has been hospitalized with an MI  -Continue metoprolol

## 2022-10-17 NOTE — ASSESSMENT & PLAN NOTE
Patient endorsed that since his wife was hospitalized at [facility] with an MI his anxiety has been not very well controlled  -Refilled his Valium to help with his anxiety as well as manage his sleep as he has not been sleeping well

## 2022-10-17 NOTE — ASSESSMENT & PLAN NOTE
Patient endorses that he has been having bilateral knee pain. He claims that the Voltaren gel has been helping sometimes. He endorses that he normally gets a Kenalog shot which helps.   He is open to trying extra strength Tylenol  -Prescribed Tylenol 650 mg twice daily  -Prescribed Voltaren gel 4 times daily

## 2022-10-17 NOTE — PROGRESS NOTES
[facility]    Angelina Rodriguez is a 67 y.o. male, here for evaluation of the following concerns:    Osteoarthritis of the knee  Hypertension  B12 deficiency  Anxiety    HPI    Patient is a 58-year-old male with past medical history of HTN, B12 deficiency, anxiety, complete heart block s/p MDT PPM, CKD 4 who presented to clinic for follow-up appointment. Patient endorses that since he was last seen, his wife suffered an MI and has been hospitalized at [facility].  He has been under a lot of stress and anxiety due to this. He finds it difficult whenever he sees his wife in such bad shape. Patient endorses that he has been compliant with all his medications and is hoping to get a refill on his Valium to help manage his anxiety. He also wanted to get a B12 shot today. Review of Systems  Patient denies any fever, chills, nausea, vomiting, chest pain, abdominal pain, diarrhea, constipation, dysuria, flank pain, urinary urgency or hesitancy, dizziness, lightheadedness. MEDICATIONS:  Prior to Visit Medications    Medication Sig Taking? Authorizing Provider   acetaminophen (TYLENOL) 325 MG tablet Take 2 tablets by mouth in the morning and at bedtime Yes David Lorenzana MD   diazePAM (VALIUM) 5 MG tablet Take 2 tablets by mouth every 12 hours as needed for Anxiety for up to 30 doses. Yes David Lorenzana MD   loratadine (CLARITIN) 10 MG tablet Take 1 tablet by mouth in the morning. Yes David Lorenzana MD   metoprolol succinate (TOPROL XL) 50 MG extended release tablet Take 1 tablet by mouth daily Yes Genesis Curry MD   lidocaine (LIDODERM) 5 % Place 1 patch onto the skin daily 12 hours on, 12 hours off.  Yes Anu Don MD   NONFORMULARY Indications: using otc \"vapor\" inhaler; can't read label Yes Historical Provider, MD   diclofenac sodium (VOLTAREN) 1 % GEL APPLY 4 G TOPICALLY 4 TIMES DAILY Yes Merlinda Irvine, MD   oxyCODONE-acetaminophen (PERCOCET) 5-325 MG per tablet Take 1 tablet by mouth 3 times daily as needed for Pain . Yes Historical Provider, MD   mirtazapine (REMERON) 15 MG tablet Take 1 tablet by mouth nightly  Patient not taking: Reported on 10/17/2022  Kyleigh Sanders MD   tadalafil (CIALIS) 5 MG tablet Take 1 tablet by mouth daily as needed for Erectile Dysfunction  Patient not taking: No sig reported  Maxwell Sandra MD        Vitals:    10/17/22 1413   BP: (!) 170/97   Site: Right Upper Arm   Position: Sitting   Cuff Size: Large Adult   Pulse: 83   Resp: 16   Temp: 96.9 °F (36.1 °C)   TempSrc: Temporal   SpO2: 97%   Weight: 146 lb 3.2 oz (66.3 kg)   Height: 5' 10\" (1.778 m)      Estimated body mass index is 20.98 kg/m² as calculated from the following:    Height as of this encounter: 5' 10\" (1.778 m). Weight as of this encounter: 146 lb 3.2 oz (66.3 kg). Physical Exam  HENT:      Head: Normocephalic and atraumatic. Right Ear: Tympanic membrane, ear canal and external ear normal. There is no impacted cerumen. Left Ear: Tympanic membrane, ear canal and external ear normal. There is no impacted cerumen. Mouth/Throat:      Mouth: Mucous membranes are moist.   Eyes:      Pupils: Pupils are equal, round, and reactive to light. Cardiovascular:      Rate and Rhythm: Normal rate and regular rhythm. Pulses: Normal pulses. Heart sounds: Normal heart sounds. No murmur heard. No friction rub. No gallop. Pulmonary:      Effort: Pulmonary effort is normal. No respiratory distress. Breath sounds: Normal breath sounds. No wheezing or rales. Abdominal:      General: Bowel sounds are normal. There is no distension. Palpations: Abdomen is soft. Tenderness: There is no abdominal tenderness. There is no guarding. Musculoskeletal:         General: Normal range of motion. Right lower leg: No edema. Left lower leg: No edema. Neurological:      Mental Status: He is alert and oriented to person, place, and time.    Psychiatric: Behavior: Behavior normal.       ASSESSMENT/PLAN:     1. Primary osteoarthritis of both knees  Assessment & Plan:  Patient endorses that he has been having bilateral knee pain. He claims that the Voltaren gel has been helping sometimes. He endorses that he normally gets a Kenalog shot which helps. He is open to trying extra strength Tylenol  -Prescribed Tylenol 650 mg twice daily  -Prescribed Voltaren gel 4 times daily    2. Vitamin B12 deficiency  Patient normally gets B12 shots every 3 months and therefore came in today for that  -B12 shot 1000 mcg IM given today    3. Generalized anxiety disorder  Patient endorsed that since his wife was hospitalized at [facility] with an MI his anxiety has been not very well controlled  -Prescribed Valium to help with his anxiety as well as manage his sleep as he has not been sleeping well    4. Essential hypertension  Patient BP was elevated in the clinic today due to the fact that he is undergoing a lot of anxiety and his wife's been hospitalized and he has been under a lot of stress.  -Discussed managing anxiety though patient's wife has been hospitalized with an MI  -Continue metoprolol    Follow-up in February 2023    Health Maintenance    -COVID Vaccine: S/p 4 shots  -Colonoscopy/Flex Sig (50-75):  -Influenza Vaccine (Yearly): Completed 9/2022  -Tdap (q10 yrs):   -Shingles (50): Completed 11/2020  -Pneumococcal vaccination (65+): Completed 9/2017  -Lung CA screening (50-75):   -HIV (15-75):   -AAA Screening (65-75):   -Prostate Cancer Screening: Last PSA 0.90, will reevaluate in 2023    The patient was staffed with the teaching attending: Dr. Bhavana Cheung. An electronic signature was used to authenticate this note.     --Iris Walls MD

## 2022-11-08 DIAGNOSIS — I10 ESSENTIAL (PRIMARY) HYPERTENSION: ICD-10-CM

## 2022-11-08 RX ORDER — METOPROLOL SUCCINATE 50 MG/1
TABLET, EXTENDED RELEASE ORAL
Qty: 90 TABLET | Refills: 1 | Status: SHIPPED | OUTPATIENT
Start: 2022-11-08

## 2022-11-08 NOTE — TELEPHONE ENCOUNTER
Requested Prescriptions     Pending Prescriptions Disp Refills    metoprolol succinate (TOPROL XL) 50 MG extended release tablet [Pharmacy Med Name: METOPROLOL SUCC ER 50 MG TAB] 90 tablet 1     Sig: TAKE 1 TABLET BY MOUTH EVERY DAY       Last Clinic Visit:  10/17/2022     Next Clinic Appointment:  2/13/2023

## 2022-12-01 ENCOUNTER — NURSE ONLY (OUTPATIENT)
Dept: CARDIOLOGY CLINIC | Age: 72
End: 2022-12-01

## 2022-12-01 DIAGNOSIS — Z95.0 PACEMAKER: ICD-10-CM

## 2022-12-01 DIAGNOSIS — R00.1 BRADYCARDIA: Primary | ICD-10-CM

## 2022-12-03 NOTE — PROGRESS NOTES
We received remote transmission from patient's dual chamber pacemaker monitor at home. Transmission shows normal sensing and pacing function. NSVT, ST/ SVT and <0.1 AT burden (Toprol). Ap 0.4%   <0.1%  Echo 01.2020 showed EF of 55-60%. EP physician will review. See interrogation under cardiology tab in the [address] [place] [address] [address] field for more details. Will continue to monitor remotely.      (End of 91-day monitoring period 12/1/22)

## 2023-01-26 ENCOUNTER — TELEPHONE (OUTPATIENT)
Dept: INTERNAL MEDICINE CLINIC | Age: 73
End: 2023-01-26

## 2023-02-08 DIAGNOSIS — I10 ESSENTIAL (PRIMARY) HYPERTENSION: ICD-10-CM

## 2023-02-08 NOTE — TELEPHONE ENCOUNTER
Requested Prescriptions     Pending Prescriptions Disp Refills    metoprolol succinate (TOPROL XL) 50 MG extended release tablet 90 tablet 1     Sig: TAKE 1 TABLET BY MOUTH EVERY DAY       Last Clinic Visit:  10/17/2022     Next Clinic Appointment:  2/13/2023

## 2023-02-09 RX ORDER — METOPROLOL SUCCINATE 50 MG/1
TABLET, EXTENDED RELEASE ORAL
Qty: 90 TABLET | Refills: 1 | Status: SHIPPED | OUTPATIENT
Start: 2023-02-09

## 2023-02-21 NOTE — PROGRESS NOTES
[facility]   Cardiac Consultation    Referring Provider:  Lexus Nicholson MD     Chief Complaint   Patient presents with    Bradycardia     Pacer          HPI:  Senait Castillo is a 75 y.o. male  with PMH HTN, recurrent falls, syncope, SVT,  p/w fall, noted to have intermittent CHB on tele, s/p dual ch MDT PPM (1/3/19, Dr. Ann Cuello). Currently taking metoprolol succinate 50 mg daily for SVT/AT. His falls have basically subsided. Here today for device management. Uses a cane to get around. He has noted mild edema which is improving. Patient has lost weight due to poor appetite he says. Seeing his primary care today for weight loss and patient overall feels achy. Patient has lost 25 lbs in the past 2 years. Device interrogation today shows normally functioning PPM with stable sensing and pacing thresholds. AP 0.8%,  <0.1%, SERGIO 11 years. Patient denies exertional chest pain/pressure, dyspnea at rest, TADEO, PND, orthopnea, palpitations, lightheadedness, changes in LE edema, and syncope. Past Medical History:   has a past medical history of Acute renal failure (ARF) ([address] Appetite absent, Bradycardia, CHB (complete heart block) ([address] DDD (degenerative disc disease), lumbosacral, Hand pain, right, Hypertension, Insomnia, Low back pain, Neck fracture (HCC), Neuropathy, Non-seasonal allergic rhinitis, Pacemaker, and Phlebitis of right arm. Surgical History:   has a past surgical history that includes Total hip arthroplasty; hip surgery; Neck surgery; fracture surgery; and pacemaker placement. Social History:   reports that he quit smoking about 3 years ago. His smoking use included cigarettes. He has a 7.50 pack-year smoking history. He has never used smokeless tobacco. He reports that he does not drink alcohol and does not use drugs. Family History:  family history includes Cancer in his father and mother; High Blood Pressure in his father.      Home Medications:  Outpatient Encounter Medications as of 2/22/2023   Medication Sig Dispense Refill    metoprolol succinate (TOPROL XL) 50 MG extended release tablet TAKE 1 TABLET BY MOUTH EVERY DAY 90 tablet 1    acetaminophen (TYLENOL) 325 MG tablet Take 2 tablets by mouth in the morning and at bedtime 360 tablet 1    loratadine (CLARITIN) 10 MG tablet Take 1 tablet by mouth in the morning. 90 tablet 1    [DISCONTINUED] lidocaine (LIDODERM) 5 % Place 1 patch onto the skin daily 12 hours on, 12 hours off. 30 patch 0    NONFORMULARY Indications: using otc \"vapor\" inhaler; can't read label      diclofenac sodium (VOLTAREN) 1 % GEL APPLY 4 G TOPICALLY 4 TIMES DAILY 200 g 0    oxyCODONE-acetaminophen (PERCOCET) 5-325 MG per tablet Take 1 tablet by mouth 3 times daily as needed for Pain .      mirtazapine (REMERON) 15 MG tablet Take 1 tablet by mouth nightly (Patient not taking: No sig reported) 90 tablet 1    tadalafil (CIALIS) 5 MG tablet Take 1 tablet by mouth daily as needed for Erectile Dysfunction 4 tablet 2     No facility-administered encounter medications on file as of 2/22/2023. Allergies:  Patient has no known allergies. Review of Systems   Constitutional: Negative for activity change and appetite change. Weight loss  HENT: Negative for facial swelling and neck pain. Eyes: Negative for discharge and itching. Respiratory: Negative for chest tightness and shortness of breath. Cardiovascular: Negative for palpitations. Negative for chest pain. Negative for leg swelling. Gastrointestinal: Negative for abdominal pain and abdominal distention. Genitourinary: Negative. Musculoskeletal: Negative. General aching   uses cane  Skin: Negative for color change and pallor. Neurological: Negative for dizziness, syncope and light-headedness. Hematological: Negative. Psychiatric/Behavioral: Negative for behavioral problems and agitation.     BP (!) 145/84   Pulse 88   Wt 136 lb (61.7 kg)   BMI 19.51 kg/m²     Echo 1/2/2020  Summary   Left ventricular cavity size is normal with mild concentric left ventricular   hypertrophy.   Left ventricular function is normal with ejection fraction estimated at   55-60%.   Aortic valve appears slightly thickened/calcified but opens adequately.   Mild tricuspid regurgitation.   Estimated pulmonary artery systolic pressure is at 32 mmHg assuming a right   atrial pressure of 3 mmHg.      Objective:  Physical Exam   Nursing note and vitals reviewed.  Constitutional: He appears thin  Head: atraumatic.   Eyes: Right eye exhibits no discharge.  Left eye exhibits no discharge.  Neck: Neck supple.  Cardiovascular: Normal rate, regular rhythm and normal heart sounds. Intact distal pulses.  Pulmonary/Chest: Effort normal and breath sounds normal.   Abdominal: Soft.  Musculoskeletal: He exhibits 2+ edema.   Neurological: He is alert without any gross abnormalities.     Skin: Skin is warm and dry. scaly  Psychiatric: He has a normal mood and affect.      Assessment:  1. Primary osteoarthritis of both knees    2. AV heart block    3. Stage 3 chronic kidney disease, unspecified whether stage 3a or 3b CKD (HCC)    4. Weight loss, unintentional    5. Cardiac pacemaker      His device is working well, but concern about weight loss, aching and feeling poorly.   He will be seeing his physician today for evaluation.  No evidence of endocarditis or cardiac etiology.  ? renal    Plan:  Continue remote device checks every 3 months  Follow up in 1 year with device check    I, Livia Godinez RN, am scribing for and in the presence of Dr. Ronald Fernandez. 02/22/23 2:57 PM  Livia Godinez RN    The scribes documentation has been prepared under my direction and personally reviewed by me in its entirety.    I confirm that the note above accurately reflects all work, treatment, procedures, and medical decision making performed by me.         Ronald Fernandez M.D.

## 2023-02-21 NOTE — PROGRESS NOTES
Patient comes in for programming evaluation on their [employer]onic dual chamber pacemaker. Last remote 12.01.2022    All sensing and pacing parameters appear unchanged since last in office check on 07.12.2022.  11.8 years estimated until SERGIO/RRT. AP 0.8%.  0.1%. SVT/ST and NSVT noted. Patient remains on metoprolol  Carelink alerts adjusted. Please see interrogation for more detail. Patient will see Dr. Fernando Pedroza today and follow up in 3 months in office or remotely.

## 2023-02-22 ENCOUNTER — NURSE ONLY (OUTPATIENT)
Dept: CARDIOLOGY CLINIC | Age: 73
End: 2023-02-22
Payer: MEDICARE

## 2023-02-22 ENCOUNTER — OFFICE VISIT (OUTPATIENT)
Dept: INTERNAL MEDICINE CLINIC | Age: 73
End: 2023-02-22
Payer: MEDICARE

## 2023-02-22 ENCOUNTER — OFFICE VISIT (OUTPATIENT)
Dept: CARDIOLOGY CLINIC | Age: 73
End: 2023-02-22
Payer: MEDICARE

## 2023-02-22 VITALS
BODY MASS INDEX: 20.69 KG/M2 | DIASTOLIC BLOOD PRESSURE: 102 MMHG | RESPIRATION RATE: 20 BRPM | SYSTOLIC BLOOD PRESSURE: 166 MMHG | TEMPERATURE: 97.8 F | WEIGHT: 136.5 LBS | HEART RATE: 75 BPM | HEIGHT: 68 IN | OXYGEN SATURATION: 98 %

## 2023-02-22 VITALS
WEIGHT: 136 LBS | HEART RATE: 88 BPM | SYSTOLIC BLOOD PRESSURE: 145 MMHG | BODY MASS INDEX: 19.51 KG/M2 | DIASTOLIC BLOOD PRESSURE: 84 MMHG

## 2023-02-22 DIAGNOSIS — N18.30 STAGE 3 CHRONIC KIDNEY DISEASE, UNSPECIFIED WHETHER STAGE 3A OR 3B CKD (HCC): ICD-10-CM

## 2023-02-22 DIAGNOSIS — I10 ESSENTIAL (PRIMARY) HYPERTENSION: ICD-10-CM

## 2023-02-22 DIAGNOSIS — F41.1 GENERALIZED ANXIETY DISORDER: ICD-10-CM

## 2023-02-22 DIAGNOSIS — Z95.0 CARDIAC PACEMAKER IN SITU: Primary | ICD-10-CM

## 2023-02-22 DIAGNOSIS — I44.30 AV HEART BLOCK: ICD-10-CM

## 2023-02-22 DIAGNOSIS — M17.0 PRIMARY OSTEOARTHRITIS OF BOTH KNEES: Primary | ICD-10-CM

## 2023-02-22 DIAGNOSIS — M17.0 PRIMARY OSTEOARTHRITIS OF BOTH KNEES: ICD-10-CM

## 2023-02-22 DIAGNOSIS — R63.4 WEIGHT LOSS, UNINTENTIONAL: ICD-10-CM

## 2023-02-22 DIAGNOSIS — Z95.0 CARDIAC PACEMAKER: ICD-10-CM

## 2023-02-22 DIAGNOSIS — R00.1 BRADYCARDIA: ICD-10-CM

## 2023-02-22 PROCEDURE — 6360000002 HC RX W HCPCS: Performed by: STUDENT IN AN ORGANIZED HEALTH CARE EDUCATION/TRAINING PROGRAM

## 2023-02-22 PROCEDURE — 3079F DIAST BP 80-89 MM HG: CPT | Performed by: INTERNAL MEDICINE

## 2023-02-22 PROCEDURE — 93280 PM DEVICE PROGR EVAL DUAL: CPT | Performed by: INTERNAL MEDICINE

## 2023-02-22 PROCEDURE — 93000 ELECTROCARDIOGRAM COMPLETE: CPT | Performed by: INTERNAL MEDICINE

## 2023-02-22 PROCEDURE — G8427 DOCREV CUR MEDS BY ELIG CLIN: HCPCS | Performed by: INTERNAL MEDICINE

## 2023-02-22 PROCEDURE — 99213 OFFICE O/P EST LOW 20 MIN: CPT | Performed by: STUDENT IN AN ORGANIZED HEALTH CARE EDUCATION/TRAINING PROGRAM

## 2023-02-22 PROCEDURE — G8484 FLU IMMUNIZE NO ADMIN: HCPCS | Performed by: INTERNAL MEDICINE

## 2023-02-22 PROCEDURE — 1123F ACP DISCUSS/DSCN MKR DOCD: CPT | Performed by: INTERNAL MEDICINE

## 2023-02-22 PROCEDURE — 3077F SYST BP >= 140 MM HG: CPT | Performed by: INTERNAL MEDICINE

## 2023-02-22 PROCEDURE — 1036F TOBACCO NON-USER: CPT | Performed by: INTERNAL MEDICINE

## 2023-02-22 PROCEDURE — 3017F COLORECTAL CA SCREEN DOC REV: CPT | Performed by: INTERNAL MEDICINE

## 2023-02-22 PROCEDURE — 99213 OFFICE O/P EST LOW 20 MIN: CPT | Performed by: INTERNAL MEDICINE

## 2023-02-22 PROCEDURE — G8420 CALC BMI NORM PARAMETERS: HCPCS | Performed by: INTERNAL MEDICINE

## 2023-02-22 RX ORDER — DIAZEPAM 5 MG/1
10 TABLET ORAL EVERY 12 HOURS PRN
Qty: 30 TABLET | Refills: 0 | Status: SHIPPED | OUTPATIENT
Start: 2023-02-22 | End: 2023-03-25

## 2023-02-22 RX ORDER — TRIAMCINOLONE ACETONIDE 40 MG/ML
40 INJECTION, SUSPENSION INTRA-ARTICULAR; INTRAMUSCULAR ONCE
Status: COMPLETED | OUTPATIENT
Start: 2023-02-22 | End: 2023-02-22

## 2023-02-22 RX ORDER — KETOROLAC TROMETHAMINE 30 MG/ML
30 INJECTION, SOLUTION INTRAMUSCULAR; INTRAVENOUS ONCE
Status: COMPLETED | OUTPATIENT
Start: 2023-02-22 | End: 2023-02-22

## 2023-02-22 RX ADMIN — KETOROLAC TROMETHAMINE 30 MG: 30 INJECTION, SOLUTION INTRAMUSCULAR; INTRAVENOUS at 14:48

## 2023-02-22 RX ADMIN — TRIAMCINOLONE ACETONIDE 40 MG: 40 INJECTION, SUSPENSION INTRA-ARTICULAR; INTRAMUSCULAR at 14:48

## 2023-02-22 ASSESSMENT — PAIN DESCRIPTION - LOCATION: LOCATION: BACK;LEG

## 2023-02-22 ASSESSMENT — PAIN SCALES - GENERAL: PAINLEVEL_OUTOF10: 6

## 2023-02-22 ASSESSMENT — PAIN DESCRIPTION - DESCRIPTORS: DESCRIPTORS: ACHING;SHOOTING;NAGGING

## 2023-02-22 ASSESSMENT — PAIN DESCRIPTION - ORIENTATION: ORIENTATION: RIGHT

## 2023-02-22 NOTE — ASSESSMENT & PLAN NOTE
Well-controlled, continue current medications   Patient did not take blood pressure medication due to waiting for it to arrive in the mail. Average blood pressure at home is 120-130 SBP.

## 2023-02-22 NOTE — PROGRESS NOTES
The [facility] Out[facility]    Tu Cameron is a 75 y.o. male, here for evaluation of the following concerns:    Back Pain  This is a chronic problem. The current episode started more than 1 year ago. The problem occurs daily. The problem is unchanged. The pain is present in the gluteal. The quality of the pain is described as aching, burning and cramping. The pain radiates to the left knee, left thigh and left foot. The pain is at a severity of 7/10. The pain is moderate. The pain is The same all the time. The symptoms are aggravated by stress, standing, lying down, bending and sitting. Stiffness is present All day. Associated symptoms include leg pain. Pertinent negatives include no paresis or perianal numbness. He has tried analgesics, heat, home exercises, ice and NSAIDs for the symptoms. The treatment provided no relief. Review of Systems    MEDICATIONS:  Prior to Visit Medications    Medication Sig Taking? Authorizing Provider   diazePAM (VALIUM) 5 MG tablet Take 2 tablets by mouth every 12 hours as needed for Anxiety for up to 30 doses. Yes Mina Lala MD   metoprolol succinate (TOPROL XL) 50 MG extended release tablet TAKE 1 TABLET BY MOUTH EVERY DAY Yes Lennie Alpers, DO   acetaminophen (TYLENOL) 325 MG tablet Take 2 tablets by mouth in the morning and at bedtime Yes Corrine Anthony MD   loratadine (CLARITIN) 10 MG tablet Take 1 tablet by mouth in the morning. Yes Corrine Anthony MD   tadalafil (CIALIS) 5 MG tablet Take 1 tablet by mouth daily as needed for Erectile Dysfunction Yes Blank Nolasco MD   NONFORMULARY Indications: using otc \"vapor\" inhaler; can't read label Yes Historical Provider, MD   diclofenac sodium (VOLTAREN) 1 % GEL APPLY 4 G TOPICALLY 4 TIMES DAILY Yes Sonia Camarena MD   oxyCODONE-acetaminophen (PERCOCET) 5-325 MG per tablet Take 1 tablet by mouth 3 times daily as needed for Pain .  Yes Historical Provider, MD   mirtazapine (REMERON) 15 MG tablet Take 1 tablet by mouth nightly  Patient not taking: No sig reported  Chelsey Box MD        Vitals:    02/22/23 1408   BP: (!) 166/102   Site: Left Upper Arm   Position: Sitting   Cuff Size: Medium Adult   Pulse: 75   Resp: 20   Temp: 97.8 °F (36.6 °C)   TempSrc: Temporal   SpO2: 98%   Weight: 136 lb 8 oz (61.9 kg)   Height: 5' 8\" (1.727 m)      Estimated body mass index is 20.75 kg/m² as calculated from the following:    Height as of this encounter: 5' 8\" (1.727 m). Weight as of this encounter: 136 lb 8 oz (61.9 kg). Physical Exam  Constitutional:       Appearance: Normal appearance. HENT:      Head: Normocephalic and atraumatic. Mouth/Throat:      Mouth: Mucous membranes are moist.      Pharynx: Oropharynx is clear. Eyes:      Conjunctiva/sclera: Conjunctivae normal.      Pupils: Pupils are equal, round, and reactive to light. Cardiovascular:      Rate and Rhythm: Normal rate and regular rhythm. Pulmonary:      Effort: Pulmonary effort is normal.      Breath sounds: Normal breath sounds. Abdominal:      General: Abdomen is flat. Bowel sounds are normal.      Palpations: Abdomen is soft. Musculoskeletal:         General: Tenderness present. Cervical back: Neck supple. Skin:     General: Skin is warm and dry. Capillary Refill: Capillary refill takes less than 2 seconds. Neurological:      General: No focal deficit present. Mental Status: He is alert and oriented to person, place, and time. Mental status is at baseline. ASSESSMENT/PLAN:     1. Essential (primary) hypertension        -  Well-controlled, continue current medications   Patient did not take blood pressure medication due to waiting for it to arrive in the mail. Average blood pressure at home is 120-130 SBP. 2. Primary osteoarthritis of both knees  -     ketorolac (TORADOL) injection 30 mg; 30 mg, IntraMUSCular, ONCE, 1 dose, On Wed 2/22/23 at 1500Do not administer for more than 5 days.   -     triamcinolone acetonide (KENALOG-40) injection 40 mg; 40 mg, IntraMUSCular, ONCE, 1 dose, On Wed 2/22/23 at 1500  3. Generalized anxiety disorder  -     diazePAM (VALIUM) 5 MG tablet; Take 2 tablets by mouth every 12 hours as needed for Anxiety for up to 30 doses. , Disp-30 tablet, R-0Print      Return in about 3 months (around 5/22/2023). The patient was staffed with teaching attending: Dr. Jose D Bland. An electronic signature was used to authenticate this note.     --Servando Moore MD

## 2023-03-16 ENCOUNTER — NURSE ONLY (OUTPATIENT)
Dept: CARDIOLOGY CLINIC | Age: 73
End: 2023-03-16

## 2023-03-16 DIAGNOSIS — Z95.0 PACEMAKER: Primary | ICD-10-CM

## 2023-03-16 DIAGNOSIS — I49.5 SSS (SICK SINUS SYNDROME) (HCC): ICD-10-CM

## 2023-03-24 NOTE — PROGRESS NOTES
We received remote transmission from patient's dual chamber pacemaker monitor at home. Transmission shows normal sensing and pacing function. NSVT and ST/ SVT (Toprol). Ap 6.6%   <0.9%  Echo 01.2020 showed EF of 55-60%. EP physician will review. See interrogation under cardiology tab in the [address] [place] [address] [address] field for more details. Will continue to monitor remotely.      (End of 91-day monitoring period 3/16/23)

## 2023-05-22 ENCOUNTER — OFFICE VISIT (OUTPATIENT)
Dept: INTERNAL MEDICINE CLINIC | Age: 73
End: 2023-05-22
Payer: MEDICARE

## 2023-05-22 VITALS
HEIGHT: 70 IN | OXYGEN SATURATION: 98 % | HEART RATE: 83 BPM | BODY MASS INDEX: 18.05 KG/M2 | DIASTOLIC BLOOD PRESSURE: 106 MMHG | SYSTOLIC BLOOD PRESSURE: 150 MMHG | WEIGHT: 126.1 LBS | TEMPERATURE: 97.1 F | RESPIRATION RATE: 20 BRPM

## 2023-05-22 DIAGNOSIS — I10 ESSENTIAL (PRIMARY) HYPERTENSION: ICD-10-CM

## 2023-05-22 DIAGNOSIS — Z12.5 PROSTATE CANCER SCREENING: ICD-10-CM

## 2023-05-22 DIAGNOSIS — E53.8 VITAMIN B12 DEFICIENCY: Primary | ICD-10-CM

## 2023-05-22 DIAGNOSIS — F41.1 GENERALIZED ANXIETY DISORDER: ICD-10-CM

## 2023-05-22 DIAGNOSIS — I10 ESSENTIAL HYPERTENSION: ICD-10-CM

## 2023-05-22 DIAGNOSIS — R63.0 APPETITE ABSENT: ICD-10-CM

## 2023-05-22 DIAGNOSIS — Z00.00 HEALTHCARE MAINTENANCE: ICD-10-CM

## 2023-05-22 DIAGNOSIS — M17.0 PRIMARY OSTEOARTHRITIS OF BOTH KNEES: ICD-10-CM

## 2023-05-22 PROCEDURE — 99213 OFFICE O/P EST LOW 20 MIN: CPT | Performed by: STUDENT IN AN ORGANIZED HEALTH CARE EDUCATION/TRAINING PROGRAM

## 2023-05-22 PROCEDURE — 96372 THER/PROPH/DIAG INJ SC/IM: CPT

## 2023-05-22 PROCEDURE — 6360000002 HC RX W HCPCS: Performed by: STUDENT IN AN ORGANIZED HEALTH CARE EDUCATION/TRAINING PROGRAM

## 2023-05-22 RX ORDER — DIAZEPAM 5 MG/1
5 TABLET ORAL EVERY 12 HOURS PRN
Qty: 10 TABLET | Refills: 0 | Status: SHIPPED | OUTPATIENT
Start: 2023-05-22 | End: 2023-06-21

## 2023-05-22 RX ORDER — TRIAMCINOLONE ACETONIDE 40 MG/ML
40 INJECTION, SUSPENSION INTRA-ARTICULAR; INTRAMUSCULAR ONCE
Status: COMPLETED | OUTPATIENT
Start: 2023-05-22 | End: 2023-05-22

## 2023-05-22 RX ORDER — MIRTAZAPINE 15 MG/1
15 TABLET, FILM COATED ORAL NIGHTLY
Qty: 90 TABLET | Refills: 1 | Status: SHIPPED | OUTPATIENT
Start: 2023-05-22

## 2023-05-22 RX ORDER — CYANOCOBALAMIN 1000 UG/ML
1000 INJECTION, SOLUTION INTRAMUSCULAR; SUBCUTANEOUS ONCE
Status: COMPLETED | OUTPATIENT
Start: 2023-05-22 | End: 2023-05-22

## 2023-05-22 RX ADMIN — CYANOCOBALAMIN 1000 MCG: 1000 INJECTION, SOLUTION INTRAMUSCULAR at 02:50

## 2023-05-22 RX ADMIN — TRIAMCINOLONE ACETONIDE 40 MG: 40 INJECTION, SUSPENSION INTRA-ARTICULAR; INTRAMUSCULAR at 14:55

## 2023-05-22 NOTE — ASSESSMENT & PLAN NOTE
Patient's BP was not well controlled during this visit. At his next visit, we will plan to add other antihypertensives other than Toprol-XL.   Patient also follows with cardiology.  -We will reassess at his next visit

## 2023-05-22 NOTE — ASSESSMENT & PLAN NOTE
Patient continues to be volume dependent for generalized anxiety disorder.   In the past he was getting 10 mg twice daily as needed currently he is down to 5 mg twice daily.  -We will continue to wean off  -PDMP reviewed

## 2023-05-22 NOTE — PATIENT INSTRUCTIONS
Please get your blood work done prior to next appointment  Please follow-up in the clinic in 3 months  Please take all your medications as prescribed

## 2023-05-22 NOTE — ASSESSMENT & PLAN NOTE
Patient in the past has always received a vitamin B-12 IM shot while in the clinic. He was given 1 this clinic visit.   Blood work was ordered for a vitamin B12 level as he has not had one done in the past.  -We will review blood work and make appropriate changes for future visits

## 2023-05-22 NOTE — ASSESSMENT & PLAN NOTE
Patient endorses that his pain is flaring up on him from time to time. He was given a Kenalog shot in the office today. We will continue to use his topical Voltaren gel and lidocaine patch.

## 2023-05-22 NOTE — PROGRESS NOTES
[facility] Outpatient [facility]    Hazel Mix. is a 75 y.o. male, here for evaluation of the following concerns:    1. HTN  2.vitamin B12 deficiency  3.osteoarthritis  4.generalized anxiety disorder  5.poor appetite    HPI    Patient is a 27-year-old male with past medical history of HTN, vitamin B12 deficiency requiring IM B12 shots, OA, VELMA, poor appetite who presented to clinic for follow-up appointment. Patient endorses that since he was last seen he has been doing well. He does claim that he did contract a cold from his grandkids and he is currently getting over it. He endorsed that he is continue to have poor appetite but never picked up the mirtazapine. After discussion about what the mirtazapine is going to form, he endorses that he will pick it up. Patient also endorsed that he would like to get the IM vitamin B12 shot as well as a Kenalog shot for his osteoarthritis. Review of Systems  Patient denies any fever, chills, nausea, vomiting, chest pain, abdominal pain, diarrhea, constipation, dysuria, flank pain, urinary urgency or hesitancy, dizziness, lightheadedness. MEDICATIONS:  Prior to Visit Medications    Medication Sig Taking? Authorizing Provider   diazePAM (VALIUM) 5 MG tablet Take 1 tablet by mouth every 12 hours as needed for Anxiety for up to 30 days. Max Daily Amount: 10 mg Yes Manny Basilio MD   mirtazapine (REMERON) 15 MG tablet Take 1 tablet by mouth nightly Yes Manny Basilio MD   metoprolol succinate (TOPROL XL) 50 MG extended release tablet TAKE 1 TABLET BY MOUTH EVERY DAY Yes Andrew Kruger DO   loratadine (CLARITIN) 10 MG tablet Take 1 tablet by mouth in the morning.  Yes Manny Basilio MD   tadalafil (CIALIS) 5 MG tablet Take 1 tablet by mouth daily as needed for Erectile Dysfunction Yes Valery Sanchez MD   diclofenac sodium (VOLTAREN) 1 % GEL APPLY 4 G TOPICALLY 4 TIMES DAILY Yes Faisal Whitman MD   oxyCODONE-acetaminophen (PERCOCET) 5-325 MG per

## 2023-05-22 NOTE — ASSESSMENT & PLAN NOTE
Patient endorses that his appetite is still poor. He never picked up the mirtazapine which was supposed to stimulate his appetite. After explained to him with the medication does, he endorses that he will pick it up and take it and see if his appetite improves.   -He will supplement his diet with ensures

## 2023-06-22 DIAGNOSIS — I10 ESSENTIAL (PRIMARY) HYPERTENSION: ICD-10-CM

## 2023-06-23 DIAGNOSIS — I10 ESSENTIAL (PRIMARY) HYPERTENSION: ICD-10-CM

## 2023-06-23 RX ORDER — METOPROLOL SUCCINATE 50 MG/1
TABLET, EXTENDED RELEASE ORAL
Qty: 90 TABLET | Refills: 1 | Status: SHIPPED | OUTPATIENT
Start: 2023-06-23

## 2023-06-23 NOTE — TELEPHONE ENCOUNTER
Requested Prescriptions     Pending Prescriptions Disp Refills    metoprolol succinate (TOPROL XL) 50 MG extended release tablet 90 tablet 1     Sig: TAKE 1 TABLET BY MOUTH EVERY DAY       Last Clinic Visit:  5/22/2023     Next Clinic Appointment:  8/21/2023

## 2023-06-23 NOTE — TELEPHONE ENCOUNTER
Requested Prescriptions     Pending Prescriptions Disp Refills    metoprolol succinate (TOPROL XL) 50 MG extended release tablet [Pharmacy Med Name: METOPROLOL SUCC ER 50 MG TAB] 90 tablet 1     Sig: TAKE 1 TABLET BY MOUTH EVERY DAY       Last Clinic Visit:  5/22/2023     Next Clinic Appointment:  6/23/2023

## 2023-06-29 ENCOUNTER — NURSE ONLY (OUTPATIENT)
Dept: CARDIOLOGY CLINIC | Age: 73
End: 2023-06-29
Payer: MEDICARE

## 2023-06-29 DIAGNOSIS — Z95.0 PACEMAKER: Primary | ICD-10-CM

## 2023-06-29 DIAGNOSIS — I44.2 CHB (COMPLETE HEART BLOCK) (HCC): ICD-10-CM

## 2023-06-29 DIAGNOSIS — R00.1 BRADYCARDIA: ICD-10-CM

## 2023-06-29 PROCEDURE — 93296 REM INTERROG EVL PM/IDS: CPT | Performed by: INTERNAL MEDICINE

## 2023-06-29 PROCEDURE — 93294 REM INTERROG EVL PM/LDLS PM: CPT | Performed by: INTERNAL MEDICINE

## 2023-07-03 NOTE — PROGRESS NOTES
Remote transmission received from patient's dual chamber pacemaker monitor at home. Transmission shows normal sensing and pacing function. ST/SVT noted (Toprol XL). Ap 0.9%   <0.1%  PVCs 8.7/hr    End of 91-day monitoring period 6/29/23. EP physician will review. See interrogation under cardiology tab in the [address] field for more details. Will continue to monitor remotely.

## 2023-08-21 ENCOUNTER — OFFICE VISIT (OUTPATIENT)
Dept: INTERNAL MEDICINE CLINIC | Age: 73
End: 2023-08-21
Payer: MEDICARE

## 2023-08-21 VITALS
DIASTOLIC BLOOD PRESSURE: 82 MMHG | WEIGHT: 137 LBS | OXYGEN SATURATION: 97 % | RESPIRATION RATE: 16 BRPM | BODY MASS INDEX: 19.66 KG/M2 | HEART RATE: 88 BPM | SYSTOLIC BLOOD PRESSURE: 143 MMHG | TEMPERATURE: 97.7 F

## 2023-08-21 DIAGNOSIS — E53.8 VITAMIN B12 DEFICIENCY: ICD-10-CM

## 2023-08-21 DIAGNOSIS — I10 ESSENTIAL HYPERTENSION: ICD-10-CM

## 2023-08-21 DIAGNOSIS — M17.0 PRIMARY OSTEOARTHRITIS OF BOTH KNEES: Primary | ICD-10-CM

## 2023-08-21 DIAGNOSIS — M17.0 PRIMARY OSTEOARTHRITIS OF BOTH KNEES: ICD-10-CM

## 2023-08-21 DIAGNOSIS — I47.1 SVT (SUPRAVENTRICULAR TACHYCARDIA) (HCC): ICD-10-CM

## 2023-08-21 PROCEDURE — 99213 OFFICE O/P EST LOW 20 MIN: CPT

## 2023-08-21 PROCEDURE — 2500000003 HC RX 250 WO HCPCS

## 2023-08-21 PROCEDURE — 96372 THER/PROPH/DIAG INJ SC/IM: CPT

## 2023-08-21 PROCEDURE — 6360000002 HC RX W HCPCS

## 2023-08-21 RX ORDER — CYANOCOBALAMIN 1000 UG/ML
1000 INJECTION, SOLUTION INTRAMUSCULAR; SUBCUTANEOUS ONCE
Status: COMPLETED | OUTPATIENT
Start: 2023-08-21 | End: 2023-08-21

## 2023-08-21 RX ORDER — TRIAMCINOLONE ACETONIDE 40 MG/ML
40 INJECTION, SUSPENSION INTRA-ARTICULAR; INTRAMUSCULAR ONCE
Status: COMPLETED | OUTPATIENT
Start: 2023-08-21 | End: 2023-08-21

## 2023-08-21 RX ORDER — CYANOCOBALAMIN 1000 UG/ML
1000 INJECTION, SOLUTION INTRAMUSCULAR; SUBCUTANEOUS ONCE
Qty: 1 ML | Refills: 0 | Status: SHIPPED | OUTPATIENT
Start: 2023-08-21 | End: 2023-08-21

## 2023-08-21 RX ORDER — LIDOCAINE HYDROCHLORIDE 10 MG/ML
0.5 INJECTION, SOLUTION EPIDURAL; INFILTRATION; INTRACAUDAL; PERINEURAL ONCE
Status: COMPLETED | OUTPATIENT
Start: 2023-08-21 | End: 2023-08-21

## 2023-08-21 RX ORDER — LIDOCAINE HYDROCHLORIDE 10 MG/ML
0.5 INJECTION, SOLUTION EPIDURAL; INFILTRATION; INTRACAUDAL; PERINEURAL ONCE
Status: SHIPPED | OUTPATIENT
Start: 2023-08-21

## 2023-08-21 RX ORDER — LIDOCAINE HYDROCHLORIDE 10 MG/ML
20 INJECTION, SOLUTION INFILTRATION; PERINEURAL ONCE
Status: SHIPPED | OUTPATIENT
Start: 2023-08-21

## 2023-08-21 RX ORDER — METHYLPREDNISOLONE ACETATE 40 MG/ML
40 INJECTION, SUSPENSION INTRA-ARTICULAR; INTRALESIONAL; INTRAMUSCULAR; SOFT TISSUE ONCE
Qty: 1 ML | Refills: 0
Start: 2023-08-21 | End: 2023-08-21

## 2023-08-21 RX ADMIN — TRIAMCINOLONE ACETONIDE 40 MG: 40 INJECTION, SUSPENSION INTRA-ARTICULAR; INTRAMUSCULAR at 15:27

## 2023-08-21 RX ADMIN — LIDOCAINE HYDROCHLORIDE 0.5 ML: 10 INJECTION, SOLUTION EPIDURAL; INFILTRATION; INTRACAUDAL; PERINEURAL at 15:27

## 2023-08-21 RX ADMIN — CYANOCOBALAMIN 1000 MCG: 1000 INJECTION, SOLUTION INTRAMUSCULAR at 15:19

## 2023-08-21 ASSESSMENT — ENCOUNTER SYMPTOMS
RESPIRATORY NEGATIVE: 1
EYES NEGATIVE: 1
GASTROINTESTINAL NEGATIVE: 1

## 2023-08-21 NOTE — PATIENT INSTRUCTIONS
Please get your labs done and follow up with your pain specialist for pain management. Please call and speak with Dr. Ector Kent about your Diazepam prescription refill.

## 2023-08-21 NOTE — PROGRESS NOTES
Spoke to patient pharmacy. Notified them to cancel the kenalog and b-12 injections that were sent to them by mistake. [facility] [place].
Thought Content: Thought content normal.         Judgment: Judgment normal.       ASSESSMENT/PLAN:     1. Primary osteoarthritis of both knees  - Depo-Medrol 40 mg injection IM given today  - Continue Voltaren as instructed and apply to painful areas   - Patient complains of bilateral knee pain, along with right wrist and shoulder pain    2. Vitamin B12 deficiency  - Cyanocobalamin 1000 mcg injection IM  - He says he receives this injection every 3-6 months during his clinic visit    3. SVT (supraventricular tachycardia) ([address])  - Patient has a pacemaker for symptomatic bradycardia since 01/03/2020  - Continue Toprol XL 50 mg 50 mg once daily    4. Essential hypertension  - Patient's BP was 152/88 this visit,   - We should reconsider his hypertension regimen at his next visit    5. Poor appetite  - Patient still complains of poor appetite although he says he is having 3 healthy meals per day  - He said he has just been busy but will try to increase his appetite and supplement with Ensure    Health Maintenance  -COVID Vaccine: S/p 4 shots  -Colonoscopy/Flex Sig (50-75): 2015  -Influenza Vaccine (Yearly): Completed 9/2022  -Tdap (q10 yrs):   -Shingles (50): Completed 11/2020  -Pneumococcal vaccination (65+): Completed 9/2017  -Lung CA screening (50-75):   -HIV (15-75):   -AAA Screening (65-75):   -Prostate Cancer Screening: Last PSA 0.90, will reevaluate in 2023    Follow-up in 3 months. The patient was staffed with teaching attending: Dr. Melissa Balbuena. An electronic signature was used to authenticate this note. --Fide Farrell MD    Addendum to Resident H& P/Progress note:  I have personally seen,examined and evaluated the patient.  I have reviewed the current history, physical findings, labs and assessment and plan and agree with note as documented by resident MD ( Vi Lopez)      Melissa Balbuena MD, [address] [place] [address]

## 2023-08-22 LAB
ALBUMIN SERPL-MCNC: 3.6 G/DL (ref 3.4–5)
ANION GAP SERPL CALCULATED.3IONS-SCNC: 12 MMOL/L (ref 3–16)
BASOPHILS # BLD: 0 K/UL (ref 0–0.2)
BASOPHILS NFR BLD: 0.5 %
BUN SERPL-MCNC: 47 MG/DL (ref 7–20)
CALCIUM SERPL-MCNC: 9.5 MG/DL (ref 8.3–10.6)
CHLORIDE SERPL-SCNC: 106 MMOL/L (ref 99–110)
CO2 SERPL-SCNC: 24 MMOL/L (ref 21–32)
CREAT SERPL-MCNC: 2.3 MG/DL (ref 0.8–1.3)
DEPRECATED RDW RBC AUTO: 12 % (ref 12.4–15.4)
EOSINOPHIL # BLD: 0.1 K/UL (ref 0–0.6)
EOSINOPHIL NFR BLD: 2.3 %
GFR SERPLBLD CREATININE-BSD FMLA CKD-EPI: 29 ML/MIN/{1.73_M2}
GLUCOSE SERPL-MCNC: 90 MG/DL (ref 70–99)
HCT VFR BLD AUTO: 41.5 % (ref 40.5–52.5)
HGB BLD-MCNC: 14.3 G/DL (ref 13.5–17.5)
LYMPHOCYTES # BLD: 3.3 K/UL (ref 1–5.1)
LYMPHOCYTES NFR BLD: 53.9 %
MCH RBC QN AUTO: 36.1 PG (ref 26–34)
MCHC RBC AUTO-ENTMCNC: 34.5 G/DL (ref 31–36)
MCV RBC AUTO: 104.7 FL (ref 80–100)
MONOCYTES # BLD: 0.6 K/UL (ref 0–1.3)
MONOCYTES NFR BLD: 10.3 %
NEUTROPHILS # BLD: 2 K/UL (ref 1.7–7.7)
NEUTROPHILS NFR BLD: 33 %
PHOSPHATE SERPL-MCNC: 4.4 MG/DL (ref 2.5–4.9)
PLATELET # BLD AUTO: 181 K/UL (ref 135–450)
PMV BLD AUTO: 11.2 FL (ref 5–10.5)
POTASSIUM SERPL-SCNC: 4.4 MMOL/L (ref 3.5–5.1)
RBC # BLD AUTO: 3.97 M/UL (ref 4.2–5.9)
SLIDE REVIEW: ABNORMAL
SODIUM SERPL-SCNC: 142 MMOL/L (ref 136–145)
WBC # BLD AUTO: 6.2 K/UL (ref 4–11)

## 2023-08-23 ENCOUNTER — TELEPHONE (OUTPATIENT)
Dept: INTERNAL MEDICINE CLINIC | Age: 73
End: 2023-08-23

## 2023-08-23 DIAGNOSIS — N18.30 CKD (CHRONIC KIDNEY DISEASE), SYMPTOM MANAGEMENT ONLY, STAGE 3 (MODERATE) (HCC): Primary | ICD-10-CM

## 2023-08-23 NOTE — PROGRESS NOTES
Spoke with the patient on call and explained to him that his most recent creatinine and GFR results were 2.3 and 29 respectively. He used to follow up with Dr. Allyssa Zaidi at [facility] and I explained to him that he would benefit from regular follow ups to manage and prevent progression of his CKD to ESRD requiring dialysis. Patient agreed to follow up and I referred him to Dr. Allyssa Zaidi at [facility].

## 2023-08-23 NOTE — TELEPHONE ENCOUNTER
Spoke with the patient on call and explained to him that his most recent creatinine and GFR results were 2.3 and 29 respectively. He used to follow up with Dr. Kory Quezada at [facility] and I explained to him that he would benefit from regular follow ups to manage and prevent progression of his CKD to ESRD requiring dialysis. Patient agreed to follow up and I referred him to Dr. Kory Quezada at [facility].

## 2023-08-29 ENCOUNTER — TELEPHONE (OUTPATIENT)
Dept: INTERNAL MEDICINE CLINIC | Age: 73
End: 2023-08-29

## 2023-08-29 DIAGNOSIS — F41.1 GENERALIZED ANXIETY DISORDER: ICD-10-CM

## 2023-08-29 RX ORDER — DIAZEPAM 5 MG/1
5 TABLET ORAL EVERY 12 HOURS PRN
Qty: 10 TABLET | Refills: 0 | Status: SHIPPED | OUTPATIENT
Start: 2023-08-29 | End: 2023-09-28

## 2023-08-29 NOTE — TELEPHONE ENCOUNTER
Please let the patient know that I have sent in a prescription for the medication. He will take this as needed.

## 2023-09-22 DIAGNOSIS — I10 ESSENTIAL (PRIMARY) HYPERTENSION: ICD-10-CM

## 2023-09-25 RX ORDER — METOPROLOL SUCCINATE 50 MG/1
TABLET, EXTENDED RELEASE ORAL
Qty: 90 TABLET | Refills: 1 | Status: SHIPPED | OUTPATIENT
Start: 2023-09-25

## 2023-09-25 NOTE — TELEPHONE ENCOUNTER
Requested Prescriptions     Pending Prescriptions Disp Refills    metoprolol succinate (TOPROL XL) 50 MG extended release tablet [Pharmacy Med Name: METOPROLOL SUCCINATE ER 50 MG Tablet Extended Release 24 Hour] 90 tablet 1     Sig: TAKE 1 TABLET EVERY DAY       Last Clinic Visit:  8/21/2023     Next Clinic Appointment:  10/30/2023

## 2023-10-11 NOTE — PROGRESS NOTES
Patient is alert and orientated x4. VSS- no fever overnight. Patient has pain to his right arm and shoulder, medicated with medication per [facility] orders. Patients right arm still being elevated d/t blisters- one large blister with some smaller ones with minimal drainage. Patient still remains without IV access, MD is aware. Fall precautions in place. Call light within reach. Will continue to assess and monitor. Spoke to Mary. She will be faxing forms to [phone]. Will work on document and fax back when ready. Rosario Reza, CMA

## 2023-10-12 PROCEDURE — 93294 REM INTERROG EVL PM/LDLS PM: CPT | Performed by: INTERNAL MEDICINE

## 2023-10-12 PROCEDURE — 93296 REM INTERROG EVL PM/IDS: CPT | Performed by: INTERNAL MEDICINE

## 2024-01-11 ENCOUNTER — TELEPHONE (OUTPATIENT)
Dept: INTERNAL MEDICINE CLINIC | Age: 74
End: 2024-01-11

## 2024-01-11 DIAGNOSIS — M15.9 PRIMARY OSTEOARTHRITIS INVOLVING MULTIPLE JOINTS: Primary | ICD-10-CM

## 2024-01-11 NOTE — PROGRESS NOTES
MD notified of patients swelling and blisters on right inner arm (1955). Patient arm is currently elevated on pillows and open to air. Will continue to assess and monitor. Notified via [employer].

## 2024-01-12 PROCEDURE — 93296 REM INTERROG EVL PM/IDS: CPT | Performed by: INTERNAL MEDICINE

## 2024-01-12 PROCEDURE — 93294 REM INTERROG EVL PM/LDLS PM: CPT | Performed by: INTERNAL MEDICINE

## 2024-02-19 DIAGNOSIS — R63.0 APPETITE ABSENT: ICD-10-CM

## 2024-02-19 RX ORDER — MIRTAZAPINE 15 MG/1
15 TABLET, FILM COATED ORAL NIGHTLY
Qty: 90 TABLET | Refills: 1 | Status: SHIPPED | OUTPATIENT
Start: 2024-02-19

## 2024-02-19 NOTE — TELEPHONE ENCOUNTER
Requested Prescriptions     Pending Prescriptions Disp Refills    mirtazapine (REMERON) 15 MG tablet 90 tablet 1     Sig: Take 1 tablet by mouth nightly       Last Clinic Visit:  8/21/2023     Next Clinic Appointment:  Visit date not found

## 2024-04-15 PROCEDURE — 93294 REM INTERROG EVL PM/LDLS PM: CPT | Performed by: INTERNAL MEDICINE

## 2024-04-15 PROCEDURE — 93296 REM INTERROG EVL PM/IDS: CPT | Performed by: INTERNAL MEDICINE

## 2024-05-16 DIAGNOSIS — R63.0 APPETITE ABSENT: ICD-10-CM

## 2024-05-16 RX ORDER — MIRTAZAPINE 15 MG/1
15 TABLET, FILM COATED ORAL NIGHTLY
Qty: 90 TABLET | Refills: 1 | Status: SHIPPED | OUTPATIENT
Start: 2024-05-16

## 2024-05-16 NOTE — TELEPHONE ENCOUNTER
Requested Prescriptions     Pending Prescriptions Disp Refills    mirtazapine (REMERON) 15 MG tablet 90 tablet 1     Sig: Take 1 tablet by mouth nightly       Last Clinic Visit:  8/21/2023     Next Clinic Appointment:  Visit date not found  HE HAS NOT BEEN SEEN 10 months

## 2024-06-04 ENCOUNTER — OFFICE VISIT (OUTPATIENT)
Dept: INTERNAL MEDICINE CLINIC | Age: 74
End: 2024-06-04
Payer: MEDICARE

## 2024-06-04 VITALS
SYSTOLIC BLOOD PRESSURE: 121 MMHG | OXYGEN SATURATION: 97 % | RESPIRATION RATE: 18 BRPM | BODY MASS INDEX: 19.49 KG/M2 | DIASTOLIC BLOOD PRESSURE: 76 MMHG | WEIGHT: 135.8 LBS | TEMPERATURE: 97.7 F | HEART RATE: 100 BPM

## 2024-06-04 DIAGNOSIS — D51.8 OTHER VITAMIN B12 DEFICIENCY ANEMIA: ICD-10-CM

## 2024-06-04 DIAGNOSIS — E53.8 VITAMIN B12 DEFICIENCY: ICD-10-CM

## 2024-06-04 DIAGNOSIS — N18.4 STAGE 4 CHRONIC KIDNEY DISEASE (HCC): ICD-10-CM

## 2024-06-04 DIAGNOSIS — Z86.79 HX OF SUPRAVENTRICULAR TACHYCARDIA: ICD-10-CM

## 2024-06-04 DIAGNOSIS — R63.0 APPETITE ABSENT: ICD-10-CM

## 2024-06-04 DIAGNOSIS — B19.20 HEPATITIS C VIRUS INFECTION WITHOUT HEPATIC COMA, UNSPECIFIED CHRONICITY: ICD-10-CM

## 2024-06-04 DIAGNOSIS — F41.1 GENERALIZED ANXIETY DISORDER: ICD-10-CM

## 2024-06-04 DIAGNOSIS — E53.8 B12 DEFICIENCY: ICD-10-CM

## 2024-06-04 DIAGNOSIS — N40.0 BENIGN PROSTATIC HYPERPLASIA WITHOUT LOWER URINARY TRACT SYMPTOMS: ICD-10-CM

## 2024-06-04 PROCEDURE — 96372 THER/PROPH/DIAG INJ SC/IM: CPT

## 2024-06-04 PROCEDURE — 6360000002 HC RX W HCPCS

## 2024-06-04 PROCEDURE — 99213 OFFICE O/P EST LOW 20 MIN: CPT

## 2024-06-04 RX ORDER — CYANOCOBALAMIN 1000 UG/ML
1000 INJECTION, SOLUTION INTRAMUSCULAR; SUBCUTANEOUS ONCE
Status: COMPLETED | OUTPATIENT
Start: 2024-06-04 | End: 2024-06-04

## 2024-06-04 RX ORDER — TAMSULOSIN HYDROCHLORIDE 0.4 MG/1
0.4 CAPSULE ORAL NIGHTLY
COMMUNITY
Start: 2023-10-13

## 2024-06-04 RX ORDER — DAPAGLIFLOZIN 10 MG/1
10 TABLET, FILM COATED ORAL DAILY
COMMUNITY
Start: 2024-05-17

## 2024-06-04 RX ORDER — LOSARTAN POTASSIUM 25 MG/1
25 TABLET ORAL DAILY
COMMUNITY
Start: 2024-04-19

## 2024-06-04 RX ORDER — DIAZEPAM 5 MG/1
5 TABLET ORAL EVERY 12 HOURS PRN
Qty: 10 TABLET | Refills: 0 | Status: SHIPPED | OUTPATIENT
Start: 2024-06-04 | End: 2024-07-04

## 2024-06-04 RX ORDER — MIRTAZAPINE 15 MG/1
15 TABLET, FILM COATED ORAL NIGHTLY
Qty: 90 TABLET | Refills: 1 | Status: SHIPPED | OUTPATIENT
Start: 2024-06-04

## 2024-06-04 RX ADMIN — CYANOCOBALAMIN 1000 MCG: 1000 INJECTION, SOLUTION INTRAMUSCULAR at 16:20

## 2024-06-04 ASSESSMENT — PATIENT HEALTH QUESTIONNAIRE - PHQ9
SUM OF ALL RESPONSES TO PHQ9 QUESTIONS 1 & 2: 0
2. FEELING DOWN, DEPRESSED OR HOPELESS: NOT AT ALL
SUM OF ALL RESPONSES TO PHQ QUESTIONS 1-9: 0
1. LITTLE INTEREST OR PLEASURE IN DOING THINGS: NOT AT ALL

## 2024-06-04 ASSESSMENT — ENCOUNTER SYMPTOMS
ABDOMINAL PAIN: 0
VOMITING: 0
NAUSEA: 0
DIARRHEA: 0
CONSTIPATION: 0

## 2024-06-04 NOTE — PATIENT INSTRUCTIONS
-Please continue taking all your medications as prescribed  -We would like to please see you for a follow-up appointment in about 4 months

## 2024-06-04 NOTE — PROGRESS NOTES
Normocephalic and atraumatic.   Eyes:      Conjunctiva/sclera: Conjunctivae normal.      Pupils: Pupils are equal, round, and reactive to light.   Cardiovascular:      Rate and Rhythm: Normal rate and regular rhythm.      Pulses: Normal pulses.      Heart sounds: Normal heart sounds. No murmur heard.  Pulmonary:      Effort: Pulmonary effort is normal. No respiratory distress.      Breath sounds: Normal breath sounds.   Abdominal:      General: Abdomen is flat. Bowel sounds are normal. There is no distension.      Palpations: Abdomen is soft.      Tenderness: There is no abdominal tenderness.   Skin:     Findings: No rash.   Neurological:      General: No focal deficit present.      Mental Status: He is alert and oriented to person, place, and time.   Psychiatric:         Mood and Affect: Mood normal.         Behavior: Behavior normal.         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      1. Stage 4 chronic kidney disease (HCC)  Patient also had renal biopsy on 10/31 which was consistent with FSGS with further workup indicating that patient was positive for hepatitis C   -Follows with Dr. Logan   -Continue losartan 25 mg daily   -Continue Farxiga 10 mg daily    2. Benign prostatic hyperplasia  Retroperitoneal ultrasound done in 10/2023 showed prostate enlargement   -Follow-up with urology   -Continue Flomax 0.4 mg daily    3. Hepatitis C virus infection without hepatic coma, unspecified chronicity  Patient was found to be positive for hepatitis C   -Follows with GI Dr. Marbin Rincon and states that he will be starting treatment for hepatitis C soon, likely with Epclusa.     4. Hx of supraventricular tachycardia   -Well controlled, follows up with cardiology   -Continue Toprol-XL 50 mg daily    5. Generalized anxiety disorder  -     diazePAM (VALIUM) 5 MG tablet; Take 1 tablet by mouth

## 2024-07-10 DIAGNOSIS — I10 ESSENTIAL (PRIMARY) HYPERTENSION: ICD-10-CM

## 2024-07-10 RX ORDER — METOPROLOL SUCCINATE 50 MG/1
TABLET, EXTENDED RELEASE ORAL
Qty: 90 TABLET | Refills: 1 | Status: SHIPPED | OUTPATIENT
Start: 2024-07-10

## 2024-07-12 DIAGNOSIS — I10 ESSENTIAL (PRIMARY) HYPERTENSION: ICD-10-CM

## 2024-07-12 RX ORDER — METOPROLOL SUCCINATE 50 MG/1
TABLET, EXTENDED RELEASE ORAL
Qty: 90 TABLET | Refills: 1 | OUTPATIENT
Start: 2024-07-12

## 2024-10-03 ENCOUNTER — OFFICE VISIT (OUTPATIENT)
Dept: INTERNAL MEDICINE CLINIC | Age: 74
End: 2024-10-03
Payer: MEDICARE

## 2024-10-03 VITALS
SYSTOLIC BLOOD PRESSURE: 155 MMHG | TEMPERATURE: 98.1 F | OXYGEN SATURATION: 97 % | RESPIRATION RATE: 16 BRPM | BODY MASS INDEX: 19.71 KG/M2 | HEART RATE: 78 BPM | DIASTOLIC BLOOD PRESSURE: 89 MMHG | HEIGHT: 70 IN | WEIGHT: 137.7 LBS

## 2024-10-03 DIAGNOSIS — I47.10 SVT (SUPRAVENTRICULAR TACHYCARDIA) (HCC): ICD-10-CM

## 2024-10-03 DIAGNOSIS — R63.0 APPETITE ABSENT: ICD-10-CM

## 2024-10-03 DIAGNOSIS — N52.9 ERECTILE DYSFUNCTION, UNSPECIFIED ERECTILE DYSFUNCTION TYPE: ICD-10-CM

## 2024-10-03 DIAGNOSIS — F41.1 GENERALIZED ANXIETY DISORDER: Primary | ICD-10-CM

## 2024-10-03 DIAGNOSIS — I44.2 CHB (COMPLETE HEART BLOCK) (HCC): ICD-10-CM

## 2024-10-03 PROCEDURE — 99213 OFFICE O/P EST LOW 20 MIN: CPT

## 2024-10-03 RX ORDER — MIRTAZAPINE 15 MG/1
15 TABLET, FILM COATED ORAL NIGHTLY
Qty: 90 TABLET | Refills: 1 | Status: SHIPPED | OUTPATIENT
Start: 2024-10-03

## 2024-10-03 RX ORDER — SILDENAFIL 100 MG/1
100 TABLET, FILM COATED ORAL PRN
Qty: 30 TABLET | Refills: 3 | Status: SHIPPED | OUTPATIENT
Start: 2024-10-03

## 2024-10-03 RX ORDER — DIAZEPAM 5 MG
5 TABLET ORAL EVERY 12 HOURS PRN
Qty: 60 TABLET | Refills: 0 | Status: SHIPPED | OUTPATIENT
Start: 2024-10-03 | End: 2024-11-02

## 2024-10-03 NOTE — PATIENT INSTRUCTIONS
Thanks for visiting the clinic today    A prescription for Viagra 100 mg has been sent to your pharmacy    A refill for your Remeron has been sent to your pharmacy    A prescription for valium 5 mg to be taken up to twice daily has been sent to your pharmacy    Please continue taking all your other medications as prescribed    Please follow-up in the clinic in 3 months

## 2024-10-03 NOTE — PROGRESS NOTES
[facility]    Rakan Oakley Jr. is a 74 y.o. male, here for evaluation of the following concerns described below:    HPI  Patient presents for follow-up. He reports that the Valium he was prescribed on his last visit really helped his anxiety but didn't last as long as he would have likes. He was taking it twice daily as prescribed and noticed a significant improvement in his anxiety and his sleep. He reports that his anxiety will get so bad that he is shaking and unable to hold onto this. He reports that normally he only sleeps 3-5 hours a night but noticed that he was \"sleeping like a baby\" when he took the valium.    Patient reports that he has been experiencing significant back pain. He rates it a 5/10 right now. He reports that he is getting spinal infection at the end of the month. He is hopeful these injections will give him some relief.     Review of Systems   Constitutional:  Negative for chills, fatigue and fever.   Respiratory:  Negative for cough, chest tightness and shortness of breath.    Cardiovascular:  Negative for chest pain, palpitations and leg swelling.   Gastrointestinal:  Negative for constipation and diarrhea.   Genitourinary:  Negative for dysuria.   Neurological:  Negative for dizziness and light-headedness.    - A 10 point review of systems was conducted and significant findings noted in HPI.    MEDICATIONS:  Prior to Visit Medications    Medication Sig Taking? Authorizing Provider   mirtazapine (REMERON) 15 MG tablet Take 1 tablet by mouth nightly Yes Sowmya Bojorquez DO   sildenafil (VIAGRA) 100 MG tablet Take 1 tablet by mouth as needed for Erectile Dysfunction Yes Sowmya Bojorquez DO   diazePAM (VALIUM) 5 MG tablet Take 1 tablet by mouth every 12 hours as needed for Anxiety or Sleep for up to 30 days. Max Daily Amount: 10 mg Yes Sowmya Bojorquez DO   metoprolol succinate (TOPROL XL) 50 MG extended release tablet TAKE 1 TABLET EVERY DAY

## 2024-10-11 DIAGNOSIS — R63.0 APPETITE ABSENT: ICD-10-CM

## 2024-10-11 DIAGNOSIS — I10 ESSENTIAL (PRIMARY) HYPERTENSION: ICD-10-CM

## 2024-10-11 RX ORDER — MIRTAZAPINE 15 MG/1
15 TABLET, FILM COATED ORAL NIGHTLY
Qty: 90 TABLET | Refills: 3 | OUTPATIENT
Start: 2024-10-11

## 2024-10-11 RX ORDER — METOPROLOL SUCCINATE 50 MG/1
TABLET, EXTENDED RELEASE ORAL
Qty: 90 TABLET | Refills: 1 | Status: SHIPPED | OUTPATIENT
Start: 2024-10-11

## 2024-10-11 NOTE — TELEPHONE ENCOUNTER
Requested Prescriptions     Pending Prescriptions Disp Refills    metoprolol succinate (TOPROL XL) 50 MG extended release tablet 90 tablet 1     Sig: TAKE 1 TABLET EVERY DAY       Last Clinic Visit:  10/3/2024     Next Clinic Appointment:  1/8/2025

## 2025-02-12 DIAGNOSIS — I10 ESSENTIAL (PRIMARY) HYPERTENSION: ICD-10-CM

## 2025-02-12 RX ORDER — METOPROLOL SUCCINATE 50 MG/1
TABLET, EXTENDED RELEASE ORAL
Qty: 90 TABLET | Refills: 3 | OUTPATIENT
Start: 2025-02-12

## 2025-02-12 NOTE — TELEPHONE ENCOUNTER
Requested Prescriptions     Pending Prescriptions Disp Refills    metoprolol succinate (TOPROL XL) 50 MG extended release tablet [Pharmacy Med Name: Metoprolol Succinate ER Oral Tablet Extended Release 24 Hour 50 MG] 90 tablet 3     Sig: TAKE 1 TABLET EVERY DAY       Last Clinic Visit:  10/3/2024     Next Clinic Appointment:  Visit date not found

## 2025-04-10 ENCOUNTER — OFFICE VISIT (OUTPATIENT)
Dept: INTERNAL MEDICINE CLINIC | Age: 75
End: 2025-04-10
Payer: MEDICARE

## 2025-04-10 VITALS
TEMPERATURE: 97.2 F | HEIGHT: 70 IN | RESPIRATION RATE: 20 BRPM | WEIGHT: 138 LBS | BODY MASS INDEX: 19.76 KG/M2 | SYSTOLIC BLOOD PRESSURE: 159 MMHG | OXYGEN SATURATION: 98 % | DIASTOLIC BLOOD PRESSURE: 95 MMHG | HEART RATE: 80 BPM

## 2025-04-10 DIAGNOSIS — F41.1 GENERALIZED ANXIETY DISORDER: ICD-10-CM

## 2025-04-10 DIAGNOSIS — I44.2 CHB (COMPLETE HEART BLOCK) (HCC): ICD-10-CM

## 2025-04-10 DIAGNOSIS — N18.4 STAGE 4 CHRONIC KIDNEY DISEASE (HCC): ICD-10-CM

## 2025-04-10 DIAGNOSIS — R63.0 APPETITE ABSENT: ICD-10-CM

## 2025-04-10 DIAGNOSIS — I10 ESSENTIAL HYPERTENSION: Primary | ICD-10-CM

## 2025-04-10 PROCEDURE — 99213 OFFICE O/P EST LOW 20 MIN: CPT | Performed by: STUDENT IN AN ORGANIZED HEALTH CARE EDUCATION/TRAINING PROGRAM

## 2025-04-10 RX ORDER — MIRTAZAPINE 15 MG/1
15 TABLET, FILM COATED ORAL NIGHTLY
Qty: 90 DEVICE | Refills: 1 | Status: SHIPPED | OUTPATIENT
Start: 2025-04-10

## 2025-04-10 RX ORDER — DIAZEPAM 5 MG/1
5 TABLET ORAL EVERY 12 HOURS PRN
Qty: 60 TABLET | Refills: 2 | Status: SHIPPED | OUTPATIENT
Start: 2025-04-10 | End: 2025-07-09

## 2025-04-10 SDOH — ECONOMIC STABILITY: FOOD INSECURITY: WITHIN THE PAST 12 MONTHS, YOU WORRIED THAT YOUR FOOD WOULD RUN OUT BEFORE YOU GOT MONEY TO BUY MORE.: SOMETIMES TRUE

## 2025-04-10 SDOH — ECONOMIC STABILITY: FOOD INSECURITY: WITHIN THE PAST 12 MONTHS, THE FOOD YOU BOUGHT JUST DIDN'T LAST AND YOU DIDN'T HAVE MONEY TO GET MORE.: SOMETIMES TRUE

## 2025-04-10 ASSESSMENT — ENCOUNTER SYMPTOMS
GASTROINTESTINAL NEGATIVE: 1
ALLERGIC/IMMUNOLOGIC NEGATIVE: 1
RESPIRATORY NEGATIVE: 1
EYES NEGATIVE: 1

## 2025-04-10 ASSESSMENT — PATIENT HEALTH QUESTIONNAIRE - PHQ9
SUM OF ALL RESPONSES TO PHQ QUESTIONS 1-9: 0
SUM OF ALL RESPONSES TO PHQ QUESTIONS 1-9: 0
2. FEELING DOWN, DEPRESSED OR HOPELESS: NOT AT ALL
1. LITTLE INTEREST OR PLEASURE IN DOING THINGS: NOT AT ALL
SUM OF ALL RESPONSES TO PHQ QUESTIONS 1-9: 0
SUM OF ALL RESPONSES TO PHQ QUESTIONS 1-9: 0

## 2025-04-10 NOTE — ASSESSMENT & PLAN NOTE
Patient endorses that his appetite is still poor.  He never picked up the mirtazapine which was supposed to stimulate his appetite.  After explained to him with the medication does, he endorses that he will pick it up and take it and see if his appetite improves.  -He supplements his diet with ensures  -Start Mirtazapine

## 2025-04-10 NOTE — PATIENT INSTRUCTIONS
[email or web address]     Apply for benefits by phone or in-person by visiting your local Job and Family Services. Locate your [facility]'s Job and family services by searching the directory at [email or web address]         Meals on Wheels   What they offer: Meals on Wheels is a program that delivers meals to individuals who have no reliable means for maintaining a healthy diet.   To Apply:   Ages 18-59:  Apply online: [email or web address]  Apply by phone: [phone]    Ages 60+:   Sun on Aging: [phone]      [employer]  What they offer: Serves neighbors in [facility] through its network of Conferences (groups of volunteers based at a [employer]) for assistance with needs such as food, clothing, furniture, rent, utilities, or beds.  Website: [email or web address]   Phone: 206.181.2667         Other (Free Text): Reviewed membership options and patient enrolled for black membership and purchased today. Detail Level: Zone

## 2025-04-10 NOTE — PROGRESS NOTES
warm.      Capillary Refill: Capillary refill takes less than 2 seconds.   Neurological:      General: No focal deficit present.      Mental Status: He is alert and oriented to person, place, and time.   Psychiatric:         Mood and Affect: Mood normal.         Behavior: Behavior normal.         Thought Content: Thought content normal.         Judgment: Judgment normal.           ASSESSMENT & PLAN     Essential hypertension   Mildly elevated BP in clinic today 138/91. Reports normal BP at home. Consistent on his medications.    - continue losartan and metoprolol    - low salt diet     CHB (complete heart block) ([facility])   No new palpitations, chest pain , feeling of heart skipping beats. No lightheadedness or syncope. Has a pacemaker in place.    - continue to monitor     Chronic renal disease, stage III ([facility]) [138829]   No change in urine output. No new urinary symptoms    - continue Farxiga    Appetite absent   Patient endorses that his appetite is still poor.  He never picked up the mirtazapine which was supposed to stimulate his appetite.  After explained to him with the medication does, he endorses that he will pick it up and take it and see if his appetite improves.  -He supplements his diet with ensures  -Start Mirtazapine       Generalized anxiety disorder   Reports good mood and resolved insomnia on Valium. No reported side effects.    - continue Valium BID     Return in about 3 months (around 7/10/2025).    The patient was staffed with teaching attending: Dr. Arias Bledsoe.    An electronic signature was used to authenticate this note.    Logan Chapman MD  Internal Medicine, PGY-1

## 2025-04-10 NOTE — ASSESSMENT & PLAN NOTE
Mildly elevated BP in clinic today 138/91. Reports normal BP at home. Consistent on his medications.    - continue losartan and metoprolol    - low salt diet

## 2025-04-10 NOTE — ASSESSMENT & PLAN NOTE
Reports good mood and resolved insomnia on Valium. No reported side effects.    - continue Valium BID

## 2025-04-10 NOTE — ASSESSMENT & PLAN NOTE
No new palpitations, chest pain , feeling of heart skipping beats. No lightheadedness or syncope. Has a pacemaker in place.    - continue to monitor

## 2025-05-16 DIAGNOSIS — I10 ESSENTIAL (PRIMARY) HYPERTENSION: ICD-10-CM

## 2025-05-19 RX ORDER — METOPROLOL SUCCINATE 50 MG/1
50 TABLET, EXTENDED RELEASE ORAL DAILY
Qty: 90 TABLET | Refills: 3 | Status: SHIPPED | OUTPATIENT
Start: 2025-05-19

## 2025-05-19 NOTE — TELEPHONE ENCOUNTER
Requested Prescriptions     Pending Prescriptions Disp Refills    metoprolol succinate (TOPROL XL) 50 MG extended release tablet [Pharmacy Med Name: Metoprolol Succinate ER Oral Tablet Extended Release 24 Hour 50 MG] 90 tablet 3     Sig: TAKE 1 TABLET EVERY DAY       Last Clinic Visit:  4/10/2025     Next Clinic Appointment:  Visit date not found

## 2025-05-22 ENCOUNTER — TELEPHONE (OUTPATIENT)
Dept: INTERNAL MEDICINE CLINIC | Age: 75
End: 2025-05-22

## 2025-05-22 NOTE — TELEPHONE ENCOUNTER
Sister called asking for all future refills to be sent to [facility] [address] [facility] [phone].      Beatrice can be reached at [phone]

## 2025-06-03 ENCOUNTER — APPOINTMENT (OUTPATIENT)
Dept: CT IMAGING | Age: 75
DRG: 683 | End: 2025-06-03
Payer: MEDICARE

## 2025-06-03 ENCOUNTER — HOSPITAL ENCOUNTER (INPATIENT)
Age: 75
LOS: 6 days | Discharge: NURSING, REHAB OR LONG-TERM CARE | DRG: 683 | End: 2025-06-09
Attending: EMERGENCY MEDICINE | Admitting: INTERNAL MEDICINE
Payer: MEDICARE

## 2025-06-03 ENCOUNTER — APPOINTMENT (OUTPATIENT)
Dept: GENERAL RADIOLOGY | Age: 75
DRG: 683 | End: 2025-06-03
Payer: MEDICARE

## 2025-06-03 DIAGNOSIS — F41.1 GENERALIZED ANXIETY DISORDER: ICD-10-CM

## 2025-06-03 DIAGNOSIS — M51.379 DDD (DEGENERATIVE DISC DISEASE), LUMBOSACRAL: ICD-10-CM

## 2025-06-03 DIAGNOSIS — M17.12 ARTHRITIS OF KNEE, LEFT: ICD-10-CM

## 2025-06-03 DIAGNOSIS — M25.562 ACUTE PAIN OF LEFT KNEE: ICD-10-CM

## 2025-06-03 DIAGNOSIS — M17.0 PRIMARY OSTEOARTHRITIS OF BOTH KNEES: ICD-10-CM

## 2025-06-03 DIAGNOSIS — R29.6 RECURRENT FALLS: Primary | ICD-10-CM

## 2025-06-03 PROBLEM — R62.7 FAILURE TO THRIVE IN ADULT: Status: ACTIVE | Noted: 2025-06-03

## 2025-06-03 LAB
ALBUMIN SERPL-MCNC: 3.7 G/DL (ref 3.4–5)
ALBUMIN/GLOB SERPL: 0.8 {RATIO} (ref 1.1–2.2)
ALP SERPL-CCNC: 97 U/L (ref 40–129)
ALT SERPL-CCNC: 27 U/L (ref 10–40)
ANION GAP SERPL CALCULATED.3IONS-SCNC: 16 MMOL/L (ref 3–16)
AST SERPL-CCNC: 44 U/L (ref 15–37)
BACTERIA URNS QL MICRO: ABNORMAL /HPF
BASOPHILS # BLD: 0 K/UL (ref 0–0.2)
BASOPHILS NFR BLD: 0.4 %
BILIRUB SERPL-MCNC: 0.7 MG/DL (ref 0–1)
BILIRUB UR QL STRIP.AUTO: ABNORMAL
BUN SERPL-MCNC: 81 MG/DL (ref 7–20)
CALCIUM SERPL-MCNC: 9.6 MG/DL (ref 8.3–10.6)
CHLORIDE SERPL-SCNC: 101 MMOL/L (ref 99–110)
CLARITY UR: CLEAR
CO2 SERPL-SCNC: 23 MMOL/L (ref 21–32)
COLOR UR: YELLOW
CREAT SERPL-MCNC: 3.6 MG/DL (ref 0.8–1.3)
CRP SERPL-MCNC: 34 MG/L (ref 0–5.1)
DEPRECATED RDW RBC AUTO: 12.1 % (ref 12.4–15.4)
EKG ATRIAL RATE: 97 BPM
EKG DIAGNOSIS: NORMAL
EKG P AXIS: 68 DEGREES
EKG P-R INTERVAL: 132 MS
EKG Q-T INTERVAL: 368 MS
EKG QRS DURATION: 78 MS
EKG QTC CALCULATION (BAZETT): 467 MS
EKG R AXIS: -2 DEGREES
EKG T AXIS: 50 DEGREES
EKG VENTRICULAR RATE: 97 BPM
EOSINOPHIL # BLD: 0.1 K/UL (ref 0–0.6)
EOSINOPHIL NFR BLD: 1.2 %
ERYTHROCYTE [SEDIMENTATION RATE] IN BLOOD BY WESTERGREN METHOD: 24 MM/HR (ref 0–20)
GFR SERPLBLD CREATININE-BSD FMLA CKD-EPI: 17 ML/MIN/{1.73_M2}
GLUCOSE SERPL-MCNC: 109 MG/DL (ref 70–99)
GLUCOSE UR STRIP.AUTO-MCNC: NEGATIVE MG/DL
HCT VFR BLD AUTO: 41.8 % (ref 40.5–52.5)
HGB BLD-MCNC: 14.4 G/DL (ref 13.5–17.5)
HGB UR QL STRIP.AUTO: ABNORMAL
KETONES UR STRIP.AUTO-MCNC: ABNORMAL MG/DL
LEUKOCYTE ESTERASE UR QL STRIP.AUTO: NEGATIVE
LIPASE SERPL-CCNC: 111 U/L (ref 13–60)
LYMPHOCYTES # BLD: 2 K/UL (ref 1–5.1)
LYMPHOCYTES NFR BLD: 25.2 %
MCH RBC QN AUTO: 31.7 PG (ref 26–34)
MCHC RBC AUTO-ENTMCNC: 34.5 G/DL (ref 31–36)
MCV RBC AUTO: 92 FL (ref 80–100)
MONOCYTES # BLD: 1 K/UL (ref 0–1.3)
MONOCYTES NFR BLD: 12.9 %
NEUTROPHILS # BLD: 4.7 K/UL (ref 1.7–7.7)
NEUTROPHILS NFR BLD: 60.3 %
NITRITE UR QL STRIP.AUTO: NEGATIVE
PH UR STRIP.AUTO: 6 [PH] (ref 5–8)
PLATELET # BLD AUTO: 390 K/UL (ref 135–450)
PMV BLD AUTO: 9.2 FL (ref 5–10.5)
POTASSIUM SERPL-SCNC: 4.3 MMOL/L (ref 3.5–5.1)
PROCALCITONIN SERPL IA-MCNC: 0.4 NG/ML (ref 0–0.15)
PROT SERPL-MCNC: 8.2 G/DL (ref 6.4–8.2)
PROT UR STRIP.AUTO-MCNC: 100 MG/DL
RBC # BLD AUTO: 4.54 M/UL (ref 4.2–5.9)
RBC #/AREA URNS HPF: ABNORMAL /HPF (ref 0–4)
SODIUM SERPL-SCNC: 140 MMOL/L (ref 136–145)
SP GR UR STRIP.AUTO: 1.02 (ref 1–1.03)
TROPONIN, HIGH SENSITIVITY: 16 NG/L (ref 0–22)
TROPONIN, HIGH SENSITIVITY: 17 NG/L (ref 0–22)
TSH SERPL DL<=0.005 MIU/L-ACNC: 4.29 UIU/ML (ref 0.27–4.2)
UA COMPLETE W REFLEX CULTURE PNL UR: ABNORMAL
UA DIPSTICK W REFLEX MICRO PNL UR: YES
URATE SERPL-MCNC: 14.2 MG/DL (ref 3.5–7.2)
URN SPEC COLLECT METH UR: ABNORMAL
UROBILINOGEN UR STRIP-ACNC: 1 E.U./DL
WBC # BLD AUTO: 7.8 K/UL (ref 4–11)
WBC #/AREA URNS HPF: ABNORMAL /HPF (ref 0–5)

## 2025-06-03 PROCEDURE — 84484 ASSAY OF TROPONIN QUANT: CPT

## 2025-06-03 PROCEDURE — 51798 US URINE CAPACITY MEASURE: CPT

## 2025-06-03 PROCEDURE — 72192 CT PELVIS W/O DYE: CPT

## 2025-06-03 PROCEDURE — 85025 COMPLETE CBC W/AUTO DIFF WBC: CPT

## 2025-06-03 PROCEDURE — 83550 IRON BINDING TEST: CPT

## 2025-06-03 PROCEDURE — 99285 EMERGENCY DEPT VISIT HI MDM: CPT

## 2025-06-03 PROCEDURE — 93005 ELECTROCARDIOGRAM TRACING: CPT

## 2025-06-03 PROCEDURE — 2500000003 HC RX 250 WO HCPCS

## 2025-06-03 PROCEDURE — 96374 THER/PROPH/DIAG INJ IV PUSH: CPT

## 2025-06-03 PROCEDURE — 85652 RBC SED RATE AUTOMATED: CPT

## 2025-06-03 PROCEDURE — 83690 ASSAY OF LIPASE: CPT

## 2025-06-03 PROCEDURE — 84145 PROCALCITONIN (PCT): CPT

## 2025-06-03 PROCEDURE — 86701 HIV-1ANTIBODY: CPT

## 2025-06-03 PROCEDURE — 82746 ASSAY OF FOLIC ACID SERUM: CPT

## 2025-06-03 PROCEDURE — 6370000000 HC RX 637 (ALT 250 FOR IP)

## 2025-06-03 PROCEDURE — 2580000003 HC RX 258

## 2025-06-03 PROCEDURE — 81001 URINALYSIS AUTO W/SCOPE: CPT

## 2025-06-03 PROCEDURE — 82803 BLOOD GASES ANY COMBINATION: CPT

## 2025-06-03 PROCEDURE — 71045 X-RAY EXAM CHEST 1 VIEW: CPT

## 2025-06-03 PROCEDURE — 73552 X-RAY EXAM OF FEMUR 2/>: CPT

## 2025-06-03 PROCEDURE — 83605 ASSAY OF LACTIC ACID: CPT

## 2025-06-03 PROCEDURE — 80053 COMPREHEN METABOLIC PANEL: CPT

## 2025-06-03 PROCEDURE — 73560 X-RAY EXAM OF KNEE 1 OR 2: CPT

## 2025-06-03 PROCEDURE — 84439 ASSAY OF FREE THYROXINE: CPT

## 2025-06-03 PROCEDURE — 72170 X-RAY EXAM OF PELVIS: CPT

## 2025-06-03 PROCEDURE — 87390 HIV-1 AG IA: CPT

## 2025-06-03 PROCEDURE — 1200000000 HC SEMI PRIVATE

## 2025-06-03 PROCEDURE — 86702 HIV-2 ANTIBODY: CPT

## 2025-06-03 PROCEDURE — 84443 ASSAY THYROID STIM HORMONE: CPT

## 2025-06-03 PROCEDURE — 86140 C-REACTIVE PROTEIN: CPT

## 2025-06-03 PROCEDURE — 84550 ASSAY OF BLOOD/URIC ACID: CPT

## 2025-06-03 PROCEDURE — 70450 CT HEAD/BRAIN W/O DYE: CPT

## 2025-06-03 PROCEDURE — 36415 COLL VENOUS BLD VENIPUNCTURE: CPT

## 2025-06-03 PROCEDURE — 6360000002 HC RX W HCPCS

## 2025-06-03 PROCEDURE — 73700 CT LOWER EXTREMITY W/O DYE: CPT

## 2025-06-03 PROCEDURE — 83540 ASSAY OF IRON: CPT

## 2025-06-03 PROCEDURE — 82607 VITAMIN B-12: CPT

## 2025-06-03 RX ORDER — ONDANSETRON 2 MG/ML
4 INJECTION INTRAMUSCULAR; INTRAVENOUS EVERY 6 HOURS PRN
Status: DISCONTINUED | OUTPATIENT
Start: 2025-06-03 | End: 2025-06-09 | Stop reason: HOSPADM

## 2025-06-03 RX ORDER — SODIUM CHLORIDE, SODIUM LACTATE, POTASSIUM CHLORIDE, CALCIUM CHLORIDE 600; 310; 30; 20 MG/100ML; MG/100ML; MG/100ML; MG/100ML
INJECTION, SOLUTION INTRAVENOUS CONTINUOUS
Status: ACTIVE | OUTPATIENT
Start: 2025-06-03 | End: 2025-06-04

## 2025-06-03 RX ORDER — POLYETHYLENE GLYCOL 3350 17 G/17G
17 POWDER, FOR SOLUTION ORAL DAILY PRN
Status: DISCONTINUED | OUTPATIENT
Start: 2025-06-03 | End: 2025-06-09 | Stop reason: HOSPADM

## 2025-06-03 RX ORDER — LOSARTAN POTASSIUM 25 MG/1
25 TABLET ORAL DAILY
Status: DISCONTINUED | OUTPATIENT
Start: 2025-06-03 | End: 2025-06-06

## 2025-06-03 RX ORDER — SODIUM CHLORIDE 0.9 % (FLUSH) 0.9 %
5-40 SYRINGE (ML) INJECTION PRN
Status: DISCONTINUED | OUTPATIENT
Start: 2025-06-03 | End: 2025-06-09 | Stop reason: HOSPADM

## 2025-06-03 RX ORDER — METOPROLOL SUCCINATE 50 MG/1
50 TABLET, EXTENDED RELEASE ORAL DAILY
Status: DISCONTINUED | OUTPATIENT
Start: 2025-06-03 | End: 2025-06-03

## 2025-06-03 RX ORDER — DIAZEPAM 5 MG/1
5 TABLET ORAL EVERY 12 HOURS PRN
Status: DISCONTINUED | OUTPATIENT
Start: 2025-06-03 | End: 2025-06-09 | Stop reason: HOSPADM

## 2025-06-03 RX ORDER — SODIUM CHLORIDE, SODIUM LACTATE, POTASSIUM CHLORIDE, AND CALCIUM CHLORIDE .6; .31; .03; .02 G/100ML; G/100ML; G/100ML; G/100ML
250 INJECTION, SOLUTION INTRAVENOUS ONCE
Status: COMPLETED | OUTPATIENT
Start: 2025-06-03 | End: 2025-06-04

## 2025-06-03 RX ORDER — ONDANSETRON 4 MG/1
4 TABLET, ORALLY DISINTEGRATING ORAL EVERY 8 HOURS PRN
Status: DISCONTINUED | OUTPATIENT
Start: 2025-06-03 | End: 2025-06-09 | Stop reason: HOSPADM

## 2025-06-03 RX ORDER — ACETAMINOPHEN 650 MG/1
650 SUPPOSITORY RECTAL EVERY 6 HOURS PRN
Status: DISCONTINUED | OUTPATIENT
Start: 2025-06-03 | End: 2025-06-09 | Stop reason: HOSPADM

## 2025-06-03 RX ORDER — SODIUM CHLORIDE 0.9 % (FLUSH) 0.9 %
5-40 SYRINGE (ML) INJECTION EVERY 12 HOURS SCHEDULED
Status: DISCONTINUED | OUTPATIENT
Start: 2025-06-03 | End: 2025-06-09 | Stop reason: HOSPADM

## 2025-06-03 RX ORDER — TAMSULOSIN HYDROCHLORIDE 0.4 MG/1
0.4 CAPSULE ORAL NIGHTLY
Status: DISCONTINUED | OUTPATIENT
Start: 2025-06-03 | End: 2025-06-04

## 2025-06-03 RX ORDER — HEPARIN SODIUM 5000 [USP'U]/ML
5000 INJECTION, SOLUTION INTRAVENOUS; SUBCUTANEOUS EVERY 8 HOURS SCHEDULED
Status: DISCONTINUED | OUTPATIENT
Start: 2025-06-03 | End: 2025-06-09 | Stop reason: HOSPADM

## 2025-06-03 RX ORDER — SODIUM CHLORIDE 9 MG/ML
INJECTION, SOLUTION INTRAVENOUS PRN
Status: DISCONTINUED | OUTPATIENT
Start: 2025-06-03 | End: 2025-06-09 | Stop reason: HOSPADM

## 2025-06-03 RX ORDER — MIRTAZAPINE 15 MG/1
15 TABLET, FILM COATED ORAL NIGHTLY
Status: DISCONTINUED | OUTPATIENT
Start: 2025-06-03 | End: 2025-06-09 | Stop reason: HOSPADM

## 2025-06-03 RX ORDER — HYDROMORPHONE HYDROCHLORIDE 1 MG/ML
0.5 INJECTION, SOLUTION INTRAMUSCULAR; INTRAVENOUS; SUBCUTANEOUS ONCE
Status: COMPLETED | OUTPATIENT
Start: 2025-06-03 | End: 2025-06-03

## 2025-06-03 RX ORDER — ACETAMINOPHEN 325 MG/1
650 TABLET ORAL EVERY 6 HOURS PRN
Status: DISCONTINUED | OUTPATIENT
Start: 2025-06-03 | End: 2025-06-09 | Stop reason: HOSPADM

## 2025-06-03 RX ORDER — METOPROLOL SUCCINATE 50 MG/1
50 TABLET, EXTENDED RELEASE ORAL DAILY
Status: DISCONTINUED | OUTPATIENT
Start: 2025-06-04 | End: 2025-06-09 | Stop reason: HOSPADM

## 2025-06-03 RX ADMIN — MIRTAZAPINE 15 MG: 15 TABLET, FILM COATED ORAL at 21:40

## 2025-06-03 RX ADMIN — TAMSULOSIN HYDROCHLORIDE 0.4 MG: 0.4 CAPSULE ORAL at 21:40

## 2025-06-03 RX ADMIN — SODIUM CHLORIDE, SODIUM LACTATE, POTASSIUM CHLORIDE, AND CALCIUM CHLORIDE 250 ML: .6; .31; .03; .02 INJECTION, SOLUTION INTRAVENOUS at 21:38

## 2025-06-03 RX ADMIN — SODIUM CHLORIDE, PRESERVATIVE FREE 10 ML: 5 INJECTION INTRAVENOUS at 21:38

## 2025-06-03 RX ADMIN — ACETAMINOPHEN 650 MG: 325 TABLET ORAL at 21:40

## 2025-06-03 RX ADMIN — HEPARIN SODIUM 5000 UNITS: 5000 INJECTION INTRAVENOUS; SUBCUTANEOUS at 21:40

## 2025-06-03 RX ADMIN — SODIUM CHLORIDE, SODIUM LACTATE, POTASSIUM CHLORIDE, AND CALCIUM CHLORIDE: .6; .31; .03; .02 INJECTION, SOLUTION INTRAVENOUS at 21:38

## 2025-06-03 RX ADMIN — DICLOFENAC SODIUM 4 G: 10 GEL TOPICAL at 22:52

## 2025-06-03 RX ADMIN — HYDROMORPHONE HYDROCHLORIDE 0.5 MG: 1 INJECTION, SOLUTION INTRAMUSCULAR; INTRAVENOUS; SUBCUTANEOUS at 15:55

## 2025-06-03 ASSESSMENT — PAIN SCALES - GENERAL
PAINLEVEL_OUTOF10: 3
PAINLEVEL_OUTOF10: 10
PAINLEVEL_OUTOF10: 3

## 2025-06-03 ASSESSMENT — LIFESTYLE VARIABLES
HOW MANY STANDARD DRINKS CONTAINING ALCOHOL DO YOU HAVE ON A TYPICAL DAY: PATIENT DOES NOT DRINK
HOW OFTEN DO YOU HAVE A DRINK CONTAINING ALCOHOL: NEVER

## 2025-06-03 NOTE — ED NOTES
Rakan Oakley Jr. is a 75 y.o. male admitted for  Active Problems:    * No active hospital problems. *  Resolved Problems:    * No resolved hospital problems. *  .   Patient Home via EMS transportation with   Chief Complaint   Patient presents with    Knee Pain     Bilateral knee pain after a fall two days ago per squad.     Fall     Pt complains of frequent falls. Pt states his left knee has been giving out.    .  Patient is alert and Person, Place, Time, and Situation  Patient's baseline mobility: DID NOT ASSESS IN ED  Code Status: Prior   Cardiac Rhythm:       Is patient on baseline Oxygen: no how many Liters:   Abnormal Assessment Findings: GENERALIZED WEAKNESS    Isolation: None      NIH Score:    C-SSRS: Risk of Suicide: No Risk  Bedside swallow:        Active LDA's:   Peripheral IV 06/03/25 Right Antecubital (Active)           Family/Caregiver Present yes Any Concerns: no   Restraints no  Sitter no         Vitals:      Vitals:    06/03/25 1441 06/03/25 1500 06/03/25 1600 06/03/25 1800   BP: 132/81 134/66 134/74 (!) 155/83   Pulse: (!) 107 99 94 (!) 102   Resp: 18  16 18   Temp: 98.1 °F (36.7 °C)      TempSrc: Oral      SpO2: 98% 98% 96% 99%       Last documented pain score (0-10 scale) Pain Level: 10  Pain medication administered Yes- see MAR.    Pertinent or High Risk Medications/Drips: No.    Pending Blood Product Administration: no    Abnormal labs:   Abnormal Labs Reviewed   CBC WITH AUTO DIFFERENTIAL - Abnormal; Notable for the following components:       Result Value    RDW 12.1 (*)     All other components within normal limits   COMPREHENSIVE METABOLIC PANEL W/ REFLEX TO MG FOR LOW K - Abnormal; Notable for the following components:    Glucose 109 (*)     BUN 81 (*)     Creatinine 3.6 (*)     Est, Glom Filt Rate 17 (*)     Albumin/Globulin Ratio 0.8 (*)     AST 44 (*)     All other components within normal limits    Narrative:     CALL  Schreiber  [facility] tel. [phone],  Chemistry results called to and read

## 2025-06-03 NOTE — ED PROVIDER NOTES
[facility]  EMERGENCY DEPARTMENT ENCOUNTER          EM RESIDENT NOTE       Date of evaluation: 6/3/2025    Chief Complaint     Knee Pain (Bilateral knee pain after a fall two days ago per squad. ) and Fall (Pt complains of frequent falls. Pt states his left knee has been giving out. )      History of Present Illness     Rakan Oakley Jr. is a 75 y.o. male with a history of complete heart block status post pacemaker, hypertension, ESRD intermittently lost to follow-up who presents to the emergency department with family for complaints of recurrent falls.  Patient's family states that he has been falling more frequently over the past several days.  The patient himself is complaining of left knee pain and essentially generalized pain.  No fevers documented at home.  The patient denies cough, abdominal pain, changes in stool.  He still does make urine.  He is not anticoagulated but it is questionable whether or not he hit his head when he fell.  He states that his falls were mechanical and without prodromal chest pain, shortness of breath or palpitations    MEDICAL DECISION MAKING / ASSESSMENT / PLAN     INITIAL VITALS: BP: 132/81, Temp: 98.1 °F (36.7 °C), Pulse: (!) 107, Respirations: 18, SpO2: 98 %  See ED course below    Is this patient to be included in the SEP-1 core measure? No Exclusion criteria - the patient is NOT to be included for SEP-1 Core Measure due to: Infection is not suspected    Medical Decision Making  Amount and/or Complexity of Data Reviewed  Labs: ordered.  Radiology: ordered.  ECG/medicine tests: ordered.    Risk  Prescription drug management.  Decision regarding hospitalization.        This patient was also evaluated by the attending physician. All care plans werediscussed and agreed upon.    Clinical Impression     1. Recurrent falls        Disposition     PATIENT REFERRED TO:  No follow-up provider specified.    DISCHARGE MEDICATIONS:  New Prescriptions    No medications on file  tenderness to palpation overlying his left knee.  There is also tenderness to palpation overlying his bilateral hips on logroll testing.  He has intact sensation to his bilateral lower extremities.  No significant tenderness appreciated on palpation of his upper extremities bilaterally.   Skin:     General: Skin is warm and dry.      Capillary Refill: Capillary refill takes less than 2 seconds.   Neurological:      General: No focal deficit present.      Mental Status: He is alert and oriented to person, place, and time.               Poncho Barbour MD  Resident  06/03/25 6793

## 2025-06-03 NOTE — H&P
Internal Medicine  PGY 2  History & Physical      CC: falls    History Obtained From:  patient, family member - sister Rosa    HISTORY OF PRESENT ILLNESS:   Rakan Oakley Jr. is an 75 y.o. 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  Unfortunately 2 different histories were provided by the patient and his sister at bedside.  According to the patient, for the last several weeks he has become lightheaded and dizzy upon standing from a seated position.  He indicates his symptoms improved with sitting down and not moving. No LOC. The patient self denies chest pain, shortness of breath, abdominal pain, nausea, vomiting, headache, fever, diarrhea, or constipation.  He is shivering in the emergency department under numerous blankets but asked if he has felt cold recently, his sister states that \"it is just really cold in here.  He keeps his room warm in the house.\" Pt himself is unable to characterize how long he has felt cold/chills. However, he indicates that he has had poor p.o. intake for several weeks.  I asked specifically about knee pain and he notes his pain has been ongoing for several weeks.  When asked which knee hurts worse, he does not respond.  He denies recent night sweats but his sister Rosa states that he has been losing weight, but only for the past 3 days.     According to his sister Rosa, however, the patient has had difficulty standing due to left knee pain and swelling which has been ongoing for 3 or 4 days without known trauma.  Since that time, he has had several falls.  According to Rosa, he falls immediately upon trying to stand from a seated position.  She denies loss of consciousness.  She notes that he typically falls to the side when she is not doing As he usually calls from another room that he fell.  Rosa notes that he has had almost nothing to eat or drink over the past 3 days but,  prior to that, he was eating normally (which, for him, [place] 1 meal a day and a couple of snacks).  When asked if prior to a week ago, he was able to walk to the mailbox, she states \"he does not walk to the mailbox.\"  When I asked if he was able to walk to the kitchen, she states \"he does not walk to the kitchen.\"  I asked her to elaborate how far he can usually walk and she states \"he sits and watches TV.\"  It was difficult to elucidate his baseline abilities.  However, Rosa does note that Rakan Davila needs complete assistance with all ADLs including cooking, eating, and bathing, and has for a while, but unclear how long.  Rakan Davila manages his own medications and he admits that he may have missed a few doses.     Pt admits to smoking cigarettes. Initially he says he does not smoke, but then indicates he smoked a cigarette today. He did not reply when asked how long he has been smoking.     According to the patient sister, Rakan Davila is not  and does not have living parents.  She confirms that he has children and was able to provide the phone numbers of 2 of them, but states she does not know how many children he has.     Called and spoke with Jaja, one of his daughters. Last known normal was Sunday when his daughter Jaja called him; he was oriented x4 at that time. I asked her if there are other children in addition to Jaja and Janny but she states \"It's mostly that's us, but there's other kids that we don't know.\" Jaja does not have their contact information.     Discussed code status with Jaja and Janny. Both indicate the pt would want to be full code.     Past Medical History:        Diagnosis Date    Acute renal failure (ARF) 12/29/2019    Appetite absent 1/26/2022    Bradycardia 3/5/2020    CHB (complete heart block) (HCC) 10/27/2021    DDD (degenerative disc disease), lumbosacral 10/10/2013    Hand pain, right 7/16/2012    Hypertension 5/18/2010    Insomnia 7/16/2012    Low

## 2025-06-03 NOTE — ED PROVIDER NOTES
ED Attending Attestation Note     Date of evaluation: 6/3/2025    This patient was seen by the resident.  I have seen and examined the patient, agree with the workup, evaluation, management and diagnosis. The care plan has been discussed.  I have reviewed the ECG and concur with the resident's interpretation. I was present for any procedures performed in the resident's  note and have made edits to the note where appropriate.    My assessment reveals 75 y.o. male presenting for multiple falls, generalized weakness, left knee and hip pain.  Here he is mildly cachectic and chronically ill-appearing, not acutely toxic.  He has tenderness in the left hip and more so in the left lateral knee.  No tibial plateau tenderness or depressions.  He has been unable to bear weight or ambulate since his most recent fall 2 days ago.  Will obtain broad labs and imaging, anticipate need for admission.       Marbin Winter MD  06/03/25 2695

## 2025-06-04 ENCOUNTER — APPOINTMENT (OUTPATIENT)
Dept: ULTRASOUND IMAGING | Age: 75
DRG: 683 | End: 2025-06-04
Payer: MEDICARE

## 2025-06-04 LAB
ALBUMIN SERPL-MCNC: 3.2 G/DL (ref 3.4–5)
AMPHETAMINES UR QL SCN>1000 NG/ML: ABNORMAL
ANION GAP SERPL CALCULATED.3IONS-SCNC: 20 MMOL/L (ref 3–16)
BARBITURATES UR QL SCN>200 NG/ML: ABNORMAL
BASE EXCESS BLDV CALC-SCNC: -5.2 MMOL/L (ref -2–3)
BASOPHILS # BLD: 0 K/UL (ref 0–0.2)
BASOPHILS NFR BLD: 0.2 %
BENZODIAZ UR QL SCN>200 NG/ML: POSITIVE
BUN SERPL-MCNC: 77 MG/DL (ref 7–20)
CALCIUM SERPL-MCNC: 9.3 MG/DL (ref 8.3–10.6)
CANNABINOIDS UR QL SCN>50 NG/ML: ABNORMAL
CHLORIDE SERPL-SCNC: 102 MMOL/L (ref 99–110)
CO2 BLDV-SCNC: 22 MMOL/L
CO2 SERPL-SCNC: 16 MMOL/L (ref 21–32)
COCAINE UR QL SCN: ABNORMAL
COHGB MFR BLDV: 1.3 % (ref 0–1.5)
CREAT SERPL-MCNC: 3.1 MG/DL (ref 0.8–1.3)
CREAT UR-MCNC: 177 MG/DL (ref 39–259)
DEPRECATED RDW RBC AUTO: 11.6 % (ref 12.4–15.4)
DO-HGB MFR BLDV: 11.8 %
DRUG SCREEN COMMENT UR-IMP: ABNORMAL
EOSINOPHIL # BLD: 0.1 K/UL (ref 0–0.6)
EOSINOPHIL NFR BLD: 0.9 %
FENTANYL SCREEN, URINE: ABNORMAL
FOLATE SERPL-MCNC: >40 NG/ML (ref 4.78–24.2)
GFR SERPLBLD CREATININE-BSD FMLA CKD-EPI: 20 ML/MIN/{1.73_M2}
GLUCOSE SERPL-MCNC: 88 MG/DL (ref 70–99)
HCO3 BLDV-SCNC: 20.3 MMOL/L (ref 24–28)
HCT VFR BLD AUTO: 41 % (ref 40.5–52.5)
HGB BLD-MCNC: 13.9 G/DL (ref 13.5–17.5)
HIV 1+2 AB+HIV1 P24 AG SERPL QL IA: NORMAL
HIV 2 AB SERPL QL IA: NORMAL
HIV1 AB SERPL QL IA: NORMAL
HIV1 P24 AG SERPL QL IA: NORMAL
IRON SATN MFR SERPL: 11 % (ref 20–50)
IRON SERPL-MCNC: 26 UG/DL (ref 59–158)
LACTATE BLDV-SCNC: 1 MMOL/L (ref 0.4–2)
LYMPHOCYTES # BLD: 2.7 K/UL (ref 1–5.1)
LYMPHOCYTES NFR BLD: 38.3 %
MAGNESIUM SERPL-MCNC: 2.52 MG/DL (ref 1.8–2.4)
MCH RBC QN AUTO: 31.2 PG (ref 26–34)
MCHC RBC AUTO-ENTMCNC: 33.8 G/DL (ref 31–36)
MCV RBC AUTO: 92.4 FL (ref 80–100)
METHADONE UR QL SCN>300 NG/ML: ABNORMAL
METHGB MFR BLDV: 0.1 % (ref 0–1.5)
MONOCYTES # BLD: 0.6 K/UL (ref 0–1.3)
MONOCYTES NFR BLD: 9 %
NEUTROPHILS # BLD: 3.7 K/UL (ref 1.7–7.7)
NEUTROPHILS NFR BLD: 51.6 %
OPIATES UR QL SCN>300 NG/ML: ABNORMAL
OXYCODONE UR QL SCN: POSITIVE
PCO2 BLDV: 38.6 MMHG (ref 41–51)
PCP UR QL SCN>25 NG/ML: ABNORMAL
PH BLDV: 7.33 [PH] (ref 7.35–7.45)
PH UR STRIP: 6 [PH]
PHOSPHATE SERPL-MCNC: 4 MG/DL (ref 2.5–4.9)
PLATELET # BLD AUTO: 315 K/UL (ref 135–450)
PMV BLD AUTO: 9.7 FL (ref 5–10.5)
PO2 BLDV: 56.7 MMHG (ref 25–40)
POTASSIUM SERPL-SCNC: 4.2 MMOL/L (ref 3.5–5.1)
RBC # BLD AUTO: 4.44 M/UL (ref 4.2–5.9)
SAO2 % BLDV: 88 %
SODIUM SERPL-SCNC: 138 MMOL/L (ref 136–145)
SODIUM UR-SCNC: 40 MMOL/L
T4 FREE SERPL-MCNC: 1.4 NG/DL (ref 0.9–1.8)
TIBC SERPL-MCNC: 246 UG/DL (ref 260–445)
VIT B12 SERPL-MCNC: 1621 PG/ML (ref 211–911)
WBC # BLD AUTO: 7.1 K/UL (ref 4–11)

## 2025-06-04 PROCEDURE — 2580000003 HC RX 258: Performed by: INTERNAL MEDICINE

## 2025-06-04 PROCEDURE — 2500000003 HC RX 250 WO HCPCS

## 2025-06-04 PROCEDURE — 6360000002 HC RX W HCPCS

## 2025-06-04 PROCEDURE — 82570 ASSAY OF URINE CREATININE: CPT

## 2025-06-04 PROCEDURE — 6370000000 HC RX 637 (ALT 250 FOR IP)

## 2025-06-04 PROCEDURE — 97530 THERAPEUTIC ACTIVITIES: CPT

## 2025-06-04 PROCEDURE — 6370000000 HC RX 637 (ALT 250 FOR IP): Performed by: NURSE PRACTITIONER

## 2025-06-04 PROCEDURE — 84300 ASSAY OF URINE SODIUM: CPT

## 2025-06-04 PROCEDURE — 97535 SELF CARE MNGMENT TRAINING: CPT

## 2025-06-04 PROCEDURE — 97162 PT EVAL MOD COMPLEX 30 MIN: CPT

## 2025-06-04 PROCEDURE — 2580000003 HC RX 258

## 2025-06-04 PROCEDURE — 1200000000 HC SEMI PRIVATE

## 2025-06-04 PROCEDURE — 76770 US EXAM ABDO BACK WALL COMP: CPT

## 2025-06-04 PROCEDURE — 2500000003 HC RX 250 WO HCPCS: Performed by: INTERNAL MEDICINE

## 2025-06-04 PROCEDURE — 85025 COMPLETE CBC W/AUTO DIFF WBC: CPT

## 2025-06-04 PROCEDURE — 83735 ASSAY OF MAGNESIUM: CPT

## 2025-06-04 PROCEDURE — 80069 RENAL FUNCTION PANEL: CPT

## 2025-06-04 PROCEDURE — 97166 OT EVAL MOD COMPLEX 45 MIN: CPT

## 2025-06-04 PROCEDURE — 36415 COLL VENOUS BLD VENIPUNCTURE: CPT

## 2025-06-04 PROCEDURE — 99222 1ST HOSP IP/OBS MODERATE 55: CPT | Performed by: NURSE PRACTITIONER

## 2025-06-04 PROCEDURE — 80307 DRUG TEST PRSMV CHEM ANLYZR: CPT

## 2025-06-04 PROCEDURE — 87040 BLOOD CULTURE FOR BACTERIA: CPT

## 2025-06-04 RX ORDER — PREDNISONE 20 MG/1
40 TABLET ORAL DAILY
Status: DISCONTINUED | OUTPATIENT
Start: 2025-06-04 | End: 2025-06-05

## 2025-06-04 RX ORDER — SODIUM BICARBONATE 650 MG/1
650 TABLET ORAL 3 TIMES DAILY
COMMUNITY

## 2025-06-04 RX ORDER — HYDROMORPHONE HYDROCHLORIDE 1 MG/ML
0.25 INJECTION, SOLUTION INTRAMUSCULAR; INTRAVENOUS; SUBCUTANEOUS ONCE
Status: COMPLETED | OUTPATIENT
Start: 2025-06-04 | End: 2025-06-04

## 2025-06-04 RX ORDER — OXYCODONE AND ACETAMINOPHEN 5; 325 MG/1; MG/1
1 TABLET ORAL EVERY 8 HOURS PRN
Refills: 0 | Status: DISCONTINUED | OUTPATIENT
Start: 2025-06-04 | End: 2025-06-06

## 2025-06-04 RX ADMIN — HYDROMORPHONE HYDROCHLORIDE 0.25 MG: 1 INJECTION, SOLUTION INTRAMUSCULAR; INTRAVENOUS; SUBCUTANEOUS at 02:15

## 2025-06-04 RX ADMIN — PREDNISONE 40 MG: 20 TABLET ORAL at 13:37

## 2025-06-04 RX ADMIN — OXYCODONE HYDROCHLORIDE AND ACETAMINOPHEN 1 TABLET: 5; 325 TABLET ORAL at 15:22

## 2025-06-04 RX ADMIN — METOPROLOL SUCCINATE 50 MG: 50 TABLET, EXTENDED RELEASE ORAL at 09:59

## 2025-06-04 RX ADMIN — MIRTAZAPINE 15 MG: 15 TABLET, FILM COATED ORAL at 21:03

## 2025-06-04 RX ADMIN — HEPARIN SODIUM 5000 UNITS: 5000 INJECTION INTRAVENOUS; SUBCUTANEOUS at 21:45

## 2025-06-04 RX ADMIN — SODIUM BICARBONATE: 84 INJECTION, SOLUTION INTRAVENOUS at 10:07

## 2025-06-04 RX ADMIN — SODIUM CHLORIDE, PRESERVATIVE FREE 10 ML: 5 INJECTION INTRAVENOUS at 10:00

## 2025-06-04 RX ADMIN — HEPARIN SODIUM 5000 UNITS: 5000 INJECTION INTRAVENOUS; SUBCUTANEOUS at 13:37

## 2025-06-04 RX ADMIN — ACETAMINOPHEN 650 MG: 325 TABLET ORAL at 09:59

## 2025-06-04 RX ADMIN — HEPARIN SODIUM 5000 UNITS: 5000 INJECTION INTRAVENOUS; SUBCUTANEOUS at 05:34

## 2025-06-04 RX ADMIN — SODIUM CHLORIDE, SODIUM LACTATE, POTASSIUM CHLORIDE, AND CALCIUM CHLORIDE 250 ML: .6; .31; .03; .02 INJECTION, SOLUTION INTRAVENOUS at 01:49

## 2025-06-04 ASSESSMENT — PAIN DESCRIPTION - ORIENTATION: ORIENTATION: LEFT

## 2025-06-04 ASSESSMENT — PAIN DESCRIPTION - DESCRIPTORS: DESCRIPTORS: ACHING;TENDER

## 2025-06-04 ASSESSMENT — PAIN SCALES - GENERAL: PAINLEVEL_OUTOF10: 7

## 2025-06-04 ASSESSMENT — PAIN DESCRIPTION - LOCATION: LOCATION: KNEE

## 2025-06-04 NOTE — CONSULTS
Patient with Hep C and F1 fibrosis last year. Saw Dr. Rincon. Rxed epclusa but did not take due to cost. Follow up upon d/c with Dr. Rnicon again to get restablished and start therapy.

## 2025-06-04 NOTE — PROGRESS NOTES
Signs:  Patient Vitals for the past 8 hrs:   BP Temp Temp src Pulse Resp SpO2 Height Weight   06/04/25 0745 (!) 144/75 98.7 °F (37.1 °C) Oral 96 16 96 % -- --   06/04/25 0406 (!) 146/78 98 °F (36.7 °C) Oral (!) 105 16 97 % 1.778 m (5' 10\") 60.5 kg (133 lb 6.1 oz)   06/04/25 0215 -- -- -- -- 16 -- -- --       Physical Exam  Vitals and nursing note reviewed.   Constitutional:       General: He is not in acute distress.     Appearance: Normal appearance. He is normal weight.   HENT:      Head: Normocephalic and atraumatic.      Right Ear: External ear normal.      Left Ear: External ear normal.      Nose: Nose normal.   Eyes:      General: No scleral icterus.     Extraocular Movements: Extraocular movements intact.      Pupils: Pupils are equal, round, and reactive to light.   Cardiovascular:      Rate and Rhythm: Normal rate and regular rhythm.      Pulses: Normal pulses.      Heart sounds: Normal heart sounds.   Pulmonary:      Effort: Pulmonary effort is normal.      Breath sounds: Normal breath sounds.   Musculoskeletal:         General: Tenderness (bilateral knees) and deformity present.      Cervical back: Normal range of motion.   Neurological:      General: No focal deficit present.      Mental Status: He is alert.   Psychiatric:         Mood and Affect: Mood normal.         Behavior: Behavior normal.            Labs:  CBC:   Recent Labs     06/03/25  1605 06/04/25  0522   WBC 7.8 7.1   HGB 14.4 13.9   HCT 41.8 41.0    315       BMP:   Recent Labs     06/03/25  1605 06/04/25  0522    138   K 4.3 4.2    102   CO2 23 16*   BUN 81* 77*   CREATININE 3.6* 3.1*   GLUCOSE 109* 88   PHOS  --  4.0     Magnesium:   Recent Labs     06/04/25  0522   MG 2.52*     LFT's:   Recent Labs     06/03/25  1605   AST 44*   ALT 27   BILITOT 0.7   ALKPHOS 97         U/A:   Recent Labs     06/03/25  1750 06/04/25  0535   COLORU Yellow  --    PHUR 6.0 6.0   WBCUA 0-2  --    RBCUA 0-2  --    BACTERIA 1+*  --   either femur, or pelvis.   2. Probable bone infarcts are present in the distal femur/distal tibia bilaterally.   3. Bilateral knee joint effusion is above.   4. Please see above for other incidental findings..      Electronically signed by Shaheen Peterson DO      XR FEMUR RIGHT (MIN 2 VIEWS)   Final Result   1. No acute osseous findings in either knee, either femur, or pelvis.   2. Probable bone infarcts are present in the distal femur/distal tibia bilaterally.   3. Bilateral knee joint effusion is above.   4. Please see above for other incidental findings..      Electronically signed by Shaheen Peterson,       XR KNEE RIGHT (1-2 VIEWS)   Final Result   1. No acute osseous findings in either knee, either femur, or pelvis.   2. Probable bone infarcts are present in the distal femur/distal tibia bilaterally.   3. Bilateral knee joint effusion is above.   4. Please see above for other incidental findings..      Electronically signed by Shaheen Peterson DO      CT HEAD WO CONTRAST   Final Result   1.  No acute intracranial abnormality.          Electronically signed by Trevor Nunez            Assessment & Plan   Rakan Oakley Jr. is an 75 y.o. male w/ hx of CHB s/p PPM, CKD4, BPH who is admitted for knee pain and swelling and encephalopathy.      Acute encephalopathy  Evaluated the patient after he received Dilaudid 0.5 mg which may have altered my exam. Does take valium 5mg BID which may have contributed. Multiple possible etiologies including uremia given new SURY on CKD, pain, overall cachexia.  Lower suspicion for infection given no leukocytosis, hypotension.  Low suspicion for cardiac cause given troponin 17 > 16 with no recent chest pain and EKG findings c/w priors (LVH). May be component of hep c given recent testing w/ hep C virus ab & HCV RNA PCR 8,140,000, however hepatic function tests on admission w/ elevation in AST to 44, otherwise unremarkable.   -Will evaluate further with broad workup including TSH,

## 2025-06-04 NOTE — PROGRESS NOTES
Consult received.   SURY on CKD with recent falls  Gentle IVF today.   Full note to follow    Heidy Rodriguez MD  [facility]

## 2025-06-04 NOTE — PROGRESS NOTES
Physical Therapy  Facility/Department: [address]  Physical Therapy Initial Assessment/treatment note    Name: Rakan Oakley Jr.  : 1950  MRN: [id number]  Date of Service: 2025    Discharge Recommendations:  Continue to assess pending progress   PT Equipment Recommendations  Equipment Needed:  (defer)      Patient Diagnosis(es): The encounter diagnosis was Recurrent falls.  Past Medical History:  has a past medical history of Acute renal failure (ARF), Appetite absent, Bradycardia, CHB (complete heart block) ([facility]), DDD (degenerative disc disease), lumbosacral, Hand pain, right, Hypertension, Insomnia, Low back pain, Neck fracture (HCC), Neuropathy, Non-seasonal allergic rhinitis, Pacemaker, and Phlebitis of right arm.  Past Surgical History:  has a past surgical history that includes Total hip arthroplasty; hip surgery; Neck surgery; fracture surgery; and pacemaker placement.    Assessment  Assessment: 74 yo admtted 6/3/25 for weakness/fall/FTT. Pt is questionable historian. Pt self limiting today due to c/o pain in B LEs and he would not participate in mobility OOB this session. Pt reports he is normally independent at home with use of cane. Will defer d/c recommendation until pt able/willing to participate more in therapy session; pt agreeable to try tomorrow or Friday is able. Recommend nursing encourage OOB PRN and often with lift equipment if pt agreeable. Discussed with RN.  Treatment Diagnosis: mobility impairment due to fall  Decision Making: Medium Complexity  Requires PT Follow-Up: Yes  Activity Tolerance  Activity Tolerance: Patient limited by pain  Activity Tolerance Comments: pt demo self limiting behavior    Plan  Physical Therapy Plan  General Plan:  (2-5)  Current Treatment Recommendations: Functional mobility training, Transfer training, Gait training  Safety Devices  Type of Devices: Nurse notified, Call light within reach, Left in bed, Bed alarm in  To: Patient  Education Provided: Role of Therapy  Education Method: Demonstration;Verbal  Barriers to Learning: Cognition  Education Outcome: Continued education needed      Therapy Time   Individual Concurrent Group Co-treatment   Time In 0900         Time Out 0938         Minutes 38          Timed Code Treatment Minutes: 28      Total Treatment Minutes:   38       Christianne Pickett, PT

## 2025-06-04 NOTE — CONSULTS
Ph: [phone], Fax: [phone]           [facility]               Reason for admission:                 Admitted for lightheadedness and dizziness with falls    Brief Summary :     Rakan Oakley Jr. is being seen by nephrology for acute kidney injury on chronic kidney disease.      Interval History and plan:      Blood pressure is relatively higher but stable  Urine output is not measured.    Plan:    Hold losartan for now.  Continue IV fluid for another 24 hours.  Add D5 as patient has ketones in the urine for starvation ketosis.    Renal panel daily.  He is hepatitis C positive and I am not sure whether he has seen GI in the past.  Please refer to GI.                     Assessment :     Chronic kidney disease stage IV: Baseline creatinine is close to 2.5.  Ultrasound of the kidney showed bilateral small size kidneys with echogenicity.  Also has a small stone.    SPEP, UPEP were negative for any M spike.  Hep C was positive and was supposed to see a gastroenterologist.  Echocardiogram showed EF of 50 to 60%.  He has history of hypertension.    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    Hypertension: He is on losartan and metoprolol at home.    Acute kidney injury: On arrival creatinine was 3.6 with a BUN of greater than 80.  This is higher than  his baseline.  It is likely prerenal etiology with poor oral intake, volume depletion.    Metabolic acidosis with urine ketones positive.    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      [facility] would like to thank Jeni Obrien MD   for opportunity to serve this patient      Please call with questions at-   24 Hrs Answering service [phone] or  7 am- 5 pm via Perfect serve or cell phone  Dr.Muhammad Conrado Logan MD       HPI :     Rakan Oakley Jr. is a 75 y.o. male presented to   the hospital on 6/3/2025 with dizziness and  MD [email or web address]

## 2025-06-04 NOTE — PROGRESS NOTES
.4 Eyes Skin Assessment     NAME:  Rakan Oakley Jr.  YOB: 1950  MEDICAL RECORD NUMBER:  [id number]    The patient is being assessed for  Admission    I agree that at least one RN has performed a thorough Head to Toe Skin Assessment on the patient. ALL assessment sites listed below have been assessed.      Areas assessed by both nurses:    Head, Face, Ears, Shoulders, Back, Chest, Arms, Elbows, Hands, Sacrum. Buttock, Coccyx, Ischium, and Legs. Feet and Heels        Does the Patient have a Wound? Yes wound(s) were present on assessment. LDA wound assessment was Initiated and completed by RN  Stage 2 pressure injury to sacrum.  Cracking/excoriation to scrotum.  Red, non-blanchable redness to left knee and right elbow.    Gerber Prevention initiated by RN: Yes  Wound Care Orders initiated by RN: Yes    Pressure Injury (Stage 3,4, Unstageable, DTI, NWPT, and Complex wounds) if present, place Wound referral order by RN under : No    New Ostomies, if present place, Ostomy referral order under : No     Nurse 1 eSignature: Electronically signed by Margot Peraza RN on 6/3/25 at 11:10 PM EDT    **SHARE this note so that the co-signing nurse can place an eSignature**    Nurse 2 eSignature: {Esignature:[id number]}

## 2025-06-04 NOTE — CONSULTS
[facility]  Palliative Medicine Consultation Note      Date Of Admission:6/3/2025  Date of consult: 06/04/25  Seen by PC in the past:  No    Recommendations:        The pt is alert and oriented to person, place, and year, disoriented to month. Sometimes he is inconsistent with answers or answers a question that was not asked, but overall is able to carry on a coherent conversation. He knows he is here due to knee pain and says it's hurting quite a lot this morning. Introduced palliative care and had a voluntary discussion about advance care planning. He said he has 8 children but is closest with Crystal and Malana. I explained a HCPOA and he said he would name Jaja, Crystal, and sister Rosa as his agents.     I spoke with sister Rosa when she arrived to the bedside. She had brought pt's medications which I passed along to pharmacy and Dr. Chan. Rosa reports that the pt manages his own medications. Answered her questions about the work up thus far. We went to the bedside to complete HCPOA but the pt said he did not want to complete it right now. I encouraged him but he then expressed feeling like I was talking like he was \"going to die tomorrow.\" I let him know he or Rosa can call me when he is ready to complete it. D/w TAMMY Castro and Dr. Campoverde.    1. Goals of Care/Advanced Care planning/Code status: Full Code, d/w pt today and he said he would want to leave that in God's hands. Clarified his wishes and he said that he wouldn't want efforts at resuscitation. Will need to d/w family when able, since his sister and 2 daughters expressed wanting him to be full code when admitting team discussed with them.   2. Pain: pt c/o severe pain in his knee and also tenderness of BLE upon gentle touch of his BLE. He takes percocet at home, and sister thinks he takes it about once per day. He p/w left knee pain and swelling, and CT of the left knee showed distal femur with a centrally located marrow  are present in the distal femur/distal tibia bilaterally.   3. Bilateral knee joint effusion is above.   4. Please see above for other incidental findings..      Electronically signed by Shaheen Peterson, DO      CT HEAD WO CONTRAST   Final Result   1.  No acute intracranial abnormality.          Electronically signed by Trevor Nunez            Conclusion/Time spent:         Recommendations see above    Time spent with patient and/or family: 40 min  Time reviewing records: 10 min   Time communicating with staff: 10 min     A total of 60 minutes spent with the patient and family on unit greater than 50% in counseling regarding palliative care and in goals of care for the patient.    Thank you to Dr. Obrien for this consultation. We will continue to follow Mr. Oakley's care as needed.    Cinthya Andrew NP  Palliative Care  [phone]

## 2025-06-04 NOTE — PROGRESS NOTES
Clinical Pharmacy Progress Note  Medication History     List of of current medications patient is taking is complete. Home Medication list in EPIC updated to reflect changes noted below.    Source of information: pt's medication bottles brought in by family this AM, pharmacy fills via dispense report    Patient's home pharmacy: [facility] - Arben Thomas ([phone])     Changes made to medication list:   Medications removed:   Acetaminophen  Diclofenac gel  Farxiga  Loratadine  Tamsulosin    Medications added:   Sodium bicarb 650mg po TID    Medication doses adjusted / updated:   none    Please call with questions--  Thanks--  Alexa May, PharmD, BCPS, BCGP  [id number] ([facility])   6/4/2025 10:38 AM      Current Outpatient Medications   Medication Instructions    diazePAM (VALIUM) 5 mg, Oral, EVERY 12 HOURS PRN    Handicap Placard MISC Does not apply, Valid from 1/12/2024-1/12/2029    losartan (COZAAR) 25 mg, DAILY    metoprolol succinate (TOPROL XL) 50 mg, Oral, DAILY    mirtazapine (REMERON) 15 mg, Oral, NIGHTLY    oxyCODONE-acetaminophen (PERCOCET) 5-325 MG per tablet 1 tablet, 3 TIMES DAILY PRN    sildenafil (VIAGRA) 100 mg, Oral, PRN    sodium bicarbonate 650 mg, 3 TIMES DAILY

## 2025-06-04 NOTE — PROGRESS NOTES
Occupational Therapy  Facility/Department: [address]  Occupational Therapy Initial Assessment/Treatment    Name: Rakan Oakley Jr.  : 1950  MRN: [id number]  Date of Service: 2025    Discharge Recommendations:  Continue to assess pending progress  OT Equipment Recommendations  Equipment Needed: No       Patient Diagnosis(es): The encounter diagnosis was Recurrent falls.  Past Medical History:  has a past medical history of Acute renal failure (ARF), Appetite absent, Bradycardia, CHB (complete heart block) (HCC), DDD (degenerative disc disease), lumbosacral, Hand pain, right, Hypertension, Insomnia, Low back pain, Neck fracture (HCC), Neuropathy, Non-seasonal allergic rhinitis, Pacemaker, and Phlebitis of right arm.  Past Surgical History:  has a past surgical history that includes Total hip arthroplasty; hip surgery; Neck surgery; fracture surgery; and pacemaker placement.    Treatment Diagnosis: Impaired functional mobility and ADL      Assessment  Performance deficits / Impairments: Decreased functional mobility ;Decreased ADL status;Decreased ROM;Decreased strength;Decreased endurance  Assessment: Pt is from home with sister and brother and reportedly independent.  Pt c/o 10/10 pain in bilateral LE and was unable to tolerate attempt of any mobility and very minimal ADL.  Pt is agreeable to attempt further therapy when pain is less.  Treatment Diagnosis: Impaired functional mobility and ADL  Prognosis: Fair  Decision Making: Medium Complexity  REQUIRES OT FOLLOW-UP: Yes  Activity Tolerance  Activity Tolerance: Patient limited by pain  Activity Tolerance Comments: Pt not able to tolerate much activity secondary to LE pain.     Plan  Occupational Therapy Plan  Times Per Week: 2-5  Current Treatment Recommendations: Strengthening, ROM, Balance training, Functional mobility training, Endurance training, Self-Care / ADL, Equipment evaluation, education, &

## 2025-06-04 NOTE — CARE COORDINATION
Case Management Assessment  Initial Evaluation    Date/Time of Evaluation: 6/4/2025 2:57 PM  Assessment Completed by: Annie Lawrence    If patient is discharged prior to next notation, then this note serves as note for discharge by case management.    Patient Name: Rakan Oakley Jr.                   YOB: 1950  Diagnosis: Failure to thrive in adult [R62.7]  Recurrent falls [R29.6]                   Date / Time: 6/3/2025  2:33 PM    Patient Admission Status: Inpatient   Readmission Risk (Low < 19, Mod (19-27), High > 27): Readmission Risk Score: 14.6    Current PCP: Logan Chapman MD  PCP verified by CM? Yes    Chart Reviewed: Yes      History Provided by: Patient, Medical Record, Child/Family  Patient Orientation: Person, Place, Situation    Patient Cognition: Other (see comment) (Lethargic and sleepy)    Hospitalization in the last 30 days (Readmission):  No    If yes, Readmission Assessment in  Navigator will be completed.    Advance Directives:      Code Status: Full Code   Patient's Primary Decision Maker is: Legal Next of Kin    Primary Decision Maker: Crystal Oakley - Child - [phone]    Primary Decision Maker: Jaja Oakley  Child - [phone]    Discharge Planning:    Patient lives with: Family Members Type of Home: House  Primary Care Giver: Family  Patient Support Systems include: Family Members, Children   Current Financial resources: Medicare  Current community resources: None  Current services prior to admission: None            Current DME:              Type of Home Care services:  None    ADLS  Prior functional level: Assistance with the following:, Mobility, Shopping, Housework  Current functional level: Assistance with the following:, Housework, Shopping, Mobility    PT AM-PAC: 6 /24  OT AM-PAC: 14 /24    Family can provide assistance at DC: Yes  Would you like Case Management to discuss the discharge plan with any other family members/significant others, and if so, who? Yes  (DTR Catilyn)  Plans to Return to Present Housing: Unknown at present  Other Identified Issues/Barriers to RETURNING to current housing: None  Potential Assistance needed at discharge: N/A            Potential DME:    Patient expects to discharge to: House  Plan for transportation at discharge:      Financial    Payor: [employer] MEDICARE / Plan: [employer] GOLD PLUS HMO / Product Type: *No Product type* /     Does insurance require precert for SNF: Yes    Potential assistance Purchasing Medications:    Meds-to-Beds request:        [facility] [address] [facility] [employer] - [place], [place] - [phone] - F [phone]  [address]  [facility] [address]  Phone: [phone] Fax: [phone]    [facility] - [facility] [address] [phone] - F [phone] [address]  [facility] [address]  Phone: [phone] Fax: [phone]      Notes:    Factors facilitating achievement of predicted outcomes: Family support    Barriers to discharge: FTT, Fall, Weakness, Vu knee pain, Uremia, acute encephalopathy    Additional Case Management Notes: Patient is from home with brother and sister.  Patient is very sleepy when speaking with CM but oriented X4.  Patient stated using WC but when spoke with DTR she stated he does not have a WC but uses a walker at baseline.  Active with PCP.  No services in the home.  Patient and family open to SNF if appropriate at discharge.  CM printed off list and placed in patient room for family to view this evening.  PT/OT ordered.      The Plan for Transition of Care is related to the following treatment goals of Failure to thrive in adult [R62.7]  Recurrent falls [R29.6]    IF APPLICABLE: The Patient and/or patient representative Rakan and his family were provided with a choice of provider and agrees with the discharge plan. Freedom of choice list with basic dialogue that supports the patient's individualized plan of care/goals and shares the quality

## 2025-06-05 PROBLEM — M10.9 GOUT OF KNEE: Status: ACTIVE | Noted: 2025-06-05

## 2025-06-05 LAB
25(OH)D3 SERPL-MCNC: 66.2 NG/ML
ALBUMIN SERPL-MCNC: 2.8 G/DL (ref 3.4–5)
ALP SERPL-CCNC: 92 U/L (ref 40–129)
ANION GAP SERPL CALCULATED.3IONS-SCNC: 13 MMOL/L (ref 3–16)
BASOPHILS # BLD: 0.2 K/UL (ref 0–0.2)
BASOPHILS NFR BLD: 3.6 %
BUN SERPL-MCNC: 65 MG/DL (ref 7–20)
CALCIUM SERPL-MCNC: 8.8 MG/DL (ref 8.3–10.6)
CHLORIDE SERPL-SCNC: 103 MMOL/L (ref 99–110)
CO2 SERPL-SCNC: 21 MMOL/L (ref 21–32)
CREAT SERPL-MCNC: 2.3 MG/DL (ref 0.8–1.3)
DEPRECATED RDW RBC AUTO: 11.8 % (ref 12.4–15.4)
EOSINOPHIL # BLD: 0 K/UL (ref 0–0.6)
EOSINOPHIL NFR BLD: 0.3 %
GFR SERPLBLD CREATININE-BSD FMLA CKD-EPI: 29 ML/MIN/{1.73_M2}
GLUCOSE SERPL-MCNC: 217 MG/DL (ref 70–99)
HCT VFR BLD AUTO: 39 % (ref 40.5–52.5)
HGB BLD-MCNC: 13.2 G/DL (ref 13.5–17.5)
INR PPP: 1.28 (ref 0.85–1.15)
LYMPHOCYTES # BLD: 0.9 K/UL (ref 1–5.1)
LYMPHOCYTES NFR BLD: 17.1 %
MAGNESIUM SERPL-MCNC: 2.47 MG/DL (ref 1.8–2.4)
MCH RBC QN AUTO: 31.3 PG (ref 26–34)
MCHC RBC AUTO-ENTMCNC: 33.9 G/DL (ref 31–36)
MCV RBC AUTO: 92.2 FL (ref 80–100)
MONOCYTES # BLD: 0.5 K/UL (ref 0–1.3)
MONOCYTES NFR BLD: 8.2 %
NEUTROPHILS # BLD: 3.9 K/UL (ref 1.7–7.7)
NEUTROPHILS NFR BLD: 70.8 %
PHOSPHATE SERPL-MCNC: 2.9 MG/DL (ref 2.5–4.9)
PLATELET # BLD AUTO: ABNORMAL K/UL (ref 135–450)
PLATELET BLD QL SMEAR: ABNORMAL
PMV BLD AUTO: ABNORMAL FL (ref 5–10.5)
POTASSIUM SERPL-SCNC: 4.3 MMOL/L (ref 3.5–5.1)
POTASSIUM SERPL-SCNC: ABNORMAL MMOL/L (ref 3.5–5.1)
PROTHROMBIN TIME: 16.2 SEC (ref 11.9–14.9)
PTH-INTACT SERPL-MCNC: 73.4 PG/ML (ref 14–72)
RBC # BLD AUTO: 4.23 M/UL (ref 4.2–5.9)
SLIDE REVIEW: ABNORMAL
SODIUM SERPL-SCNC: 137 MMOL/L (ref 136–145)
WBC # BLD AUTO: 5.5 K/UL (ref 4–11)

## 2025-06-05 PROCEDURE — 6370000000 HC RX 637 (ALT 250 FOR IP)

## 2025-06-05 PROCEDURE — 84132 ASSAY OF SERUM POTASSIUM: CPT

## 2025-06-05 PROCEDURE — 85025 COMPLETE CBC W/AUTO DIFF WBC: CPT

## 2025-06-05 PROCEDURE — 82652 VIT D 1 25-DIHYDROXY: CPT

## 2025-06-05 PROCEDURE — 1200000000 HC SEMI PRIVATE

## 2025-06-05 PROCEDURE — 6360000002 HC RX W HCPCS

## 2025-06-05 PROCEDURE — 6370000000 HC RX 637 (ALT 250 FOR IP): Performed by: NURSE PRACTITIONER

## 2025-06-05 PROCEDURE — 6360000002 HC RX W HCPCS: Performed by: INTERNAL MEDICINE

## 2025-06-05 PROCEDURE — 36415 COLL VENOUS BLD VENIPUNCTURE: CPT

## 2025-06-05 PROCEDURE — 97530 THERAPEUTIC ACTIVITIES: CPT

## 2025-06-05 PROCEDURE — 84075 ASSAY ALKALINE PHOSPHATASE: CPT

## 2025-06-05 PROCEDURE — 2500000003 HC RX 250 WO HCPCS

## 2025-06-05 PROCEDURE — 83970 ASSAY OF PARATHORMONE: CPT

## 2025-06-05 PROCEDURE — 80069 RENAL FUNCTION PANEL: CPT

## 2025-06-05 PROCEDURE — 6370000000 HC RX 637 (ALT 250 FOR IP): Performed by: INTERNAL MEDICINE

## 2025-06-05 PROCEDURE — 83735 ASSAY OF MAGNESIUM: CPT

## 2025-06-05 PROCEDURE — 85610 PROTHROMBIN TIME: CPT

## 2025-06-05 PROCEDURE — 82306 VITAMIN D 25 HYDROXY: CPT

## 2025-06-05 RX ORDER — DEXAMETHASONE SODIUM PHOSPHATE 4 MG/ML
10 INJECTION, SOLUTION INTRA-ARTICULAR; INTRALESIONAL; INTRAMUSCULAR; INTRAVENOUS; SOFT TISSUE ONCE
Status: DISCONTINUED | OUTPATIENT
Start: 2025-06-05 | End: 2025-06-05

## 2025-06-05 RX ORDER — DEXAMETHASONE SODIUM PHOSPHATE 4 MG/ML
6 INJECTION, SOLUTION INTRA-ARTICULAR; INTRALESIONAL; INTRAMUSCULAR; INTRAVENOUS; SOFT TISSUE ONCE
Status: COMPLETED | OUTPATIENT
Start: 2025-06-05 | End: 2025-06-05

## 2025-06-05 RX ADMIN — SODIUM CHLORIDE, PRESERVATIVE FREE 10 ML: 5 INJECTION INTRAVENOUS at 10:43

## 2025-06-05 RX ADMIN — PREDNISONE 40 MG: 20 TABLET ORAL at 10:42

## 2025-06-05 RX ADMIN — DEXAMETHASONE SODIUM PHOSPHATE 6 MG: 4 INJECTION INTRA-ARTICULAR; INTRALESIONAL; INTRAMUSCULAR; INTRAVENOUS; SOFT TISSUE at 14:13

## 2025-06-05 RX ADMIN — LOSARTAN POTASSIUM 25 MG: 25 TABLET, FILM COATED ORAL at 10:42

## 2025-06-05 RX ADMIN — SODIUM CHLORIDE, PRESERVATIVE FREE 10 ML: 5 INJECTION INTRAVENOUS at 22:22

## 2025-06-05 RX ADMIN — HEPARIN SODIUM 5000 UNITS: 5000 INJECTION INTRAVENOUS; SUBCUTANEOUS at 05:47

## 2025-06-05 RX ADMIN — MIRTAZAPINE 15 MG: 15 TABLET, FILM COATED ORAL at 20:34

## 2025-06-05 RX ADMIN — OXYCODONE HYDROCHLORIDE AND ACETAMINOPHEN 1 TABLET: 5; 325 TABLET ORAL at 11:29

## 2025-06-05 RX ADMIN — OXYCODONE HYDROCHLORIDE AND ACETAMINOPHEN 1 TABLET: 5; 325 TABLET ORAL at 20:34

## 2025-06-05 RX ADMIN — METOPROLOL SUCCINATE 50 MG: 50 TABLET, EXTENDED RELEASE ORAL at 10:42

## 2025-06-05 RX ADMIN — ACETAMINOPHEN 650 MG: 325 TABLET ORAL at 14:11

## 2025-06-05 ASSESSMENT — PAIN SCALES - GENERAL: PAINLEVEL_OUTOF10: 7

## 2025-06-05 ASSESSMENT — PAIN DESCRIPTION - LOCATION: LOCATION: LEG

## 2025-06-05 ASSESSMENT — PAIN DESCRIPTION - ORIENTATION: ORIENTATION: LEFT

## 2025-06-05 NOTE — PROGRESS NOTES
Physical Therapy  Facility/Department: [address]  Physical Therapy treatment note    Name: Rakan Oakley Jr.  : 1950  MRN: [id number]  Date of Service: 2025    Discharge Recommendations:  Subacute/Skilled Nursing Facility   PT Equipment Recommendations  Equipment Needed:  (defer)      Patient Diagnosis(es): The encounter diagnosis was Recurrent falls.  Past Medical History:  has a past medical history of Acute renal failure (ARF), Appetite absent, Bradycardia, CHB (complete heart block) (HCC), DDD (degenerative disc disease), lumbosacral, Hand pain, right, Hypertension, Insomnia, Low back pain, Neck fracture (HCC), Neuropathy, Non-seasonal allergic rhinitis, Pacemaker, and Phlebitis of right arm.  Past Surgical History:  has a past surgical history that includes Total hip arthroplasty; hip surgery; Neck surgery; fracture surgery; and pacemaker placement.    Assessment  Assessment: Pt able to tolerated increased mobility this session but still limited by c/o L LE. Ortho consult 25 with results of Bilat knee pain, most likely secondary to combination of OA, gout, and chronic bony infarcts per notes. Pt able to sit EOB but declined feet on floor/gait/transfers due to L LE pain. Pt demo mobility well below his reported baseline of independent at home with RW. Pt cooperative this session. Recommend SNF at discharge to maximize pt's functional status and pt in agreement. Recommend nursing encourage OOB to chair as pt tolerates. Discussed with RN.  Treatment Diagnosis: mobility impairment due to fall  Requires PT Follow-Up: Yes  Activity Tolerance  Activity Tolerance: Patient limited by pain  Activity Tolerance Comments: pt demo  some self limiting behavior    Plan  Physical Therapy Plan  General Plan:  (2-5)  Current Treatment Recommendations: Functional mobility training, Transfer training, Gait training  Safety Devices  Type of Devices: Nurse notified, Call light within reach, Left in bed,  Goals   Patient Goals : return home       Education  Patient Education  Education Given To: Patient  Education Provided: Role of Therapy  Education Method: Demonstration;Verbal  Barriers to Learning: Cognition  Education Outcome: Continued education needed      Therapy Time   Individual Concurrent Group Co-treatment   Time In      1142   Time Out      1205   Minutes      23    Timed Code Treatment Minutes: 23      Total Treatment Minutes:   23     Christianne Pickett, PT

## 2025-06-05 NOTE — PROGRESS NOTES
Internal Medicine Progress Note    Date: 6/5/2025   Patient: Rakan Oakley Jr.   Hospital Day: 2      CC: Knee Pain (Bilateral knee pain after a fall two days ago per squad. ) and Fall (Pt complains of frequent falls. Pt states his left knee has been giving out. )       Interval Hx   No acute events overnight, vital signs stable. Patient sleeping when seen but wakes up and provides 1-word answers when asked questions. Endorses only some soreness in his legs today, is no longer tender to palpation on exam.      HPI (from H&P):   Rakan Oakley Jr. is an 75 y.o. 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  Unfortunately 2 different histories were provided by the patient and his sister at bedside.  According to the patient, for the last several weeks he has become lightheaded and dizzy upon standing from a seated position.  He indicates his symptoms improved with sitting down and not moving. No LOC. The patient self denies chest pain, shortness of breath, abdominal pain, nausea, vomiting, headache, fever, diarrhea, or constipation.  He is shivering in the emergency department under numerous blankets but asked if he has felt cold recently, his sister states that \"it is just really cold in here.  He keeps his room warm in the house.\" Pt himself is unable to characterize how long he has felt cold/chills. However, he indicates that he has had poor p.o. intake for several weeks.  I asked specifically about knee pain and he notes his pain has been ongoing for several weeks.  When asked which knee hurts worse, he does not respond.  He denies recent night sweats but his sister Rosa states that he has been losing weight, but only for the past 3 days.      According to his sister Rosa, however, the patient has had difficulty standing due to left knee pain and swelling which has been ongoing for 3 or 4 days without  virus ab & HCV RNA PCR 8,140,000, however hepatic function tests on admission w/ elevation in AST to 44, otherwise unremarkable.   -Will evaluate further with broad workup including TSH, iron/TIBC//B12   - blood cultures ordered out of an abundance of caution   - strict I/o  - gentle fluids 100/hr  - lactic acid      Bone infarcts of bilateral femurs  Bilateral knee pain  Uremia  CT L knee w/ distal femur there is a centrally located marrow replacing lesion measuring 9.2 x 5.2 cm. This has a sclerotic rim, involving a majority of the lesion.  Similar-appearing lesion is present in the proximal tibia measuring 7.5 x 4.1 cm. Also noted is left knee effusion. On the right knee, there is an essentially located marrow replacing lesion measuring approximately 5.2 x 3.5 cm. This has a sclerotic rim, involving majority of the lesion, and may have been present on radiograph from 9/7/2006. Similar-appearing lesion in the proximal tibia measuring 4.5 x 3.7 cm.   Uric acid 14.2 on presentation with bilateral knee pain and swelling of left knee. No prior history of gout, but PCP has been documenting bilateral knee pain in the past, was presumed to be osteoarthritis. Concern for hepatitis-C associated osteosclerosis, but less likely given normal markers of bone turnover. More likely represents acute gout flare with elevated uric acid, rapid improvement with steroids.  - Colchicine and NSAIDs contraindicated in setting of CKD  - Continue prednisone 40 mg     Frequent falls  Likely 2/2 general debility in setting of poor PO intake (which has been noted in PCP notes for years). No e/o anemia. May be 2/2 bone infarcts, discussed above.   - imaging in ED including CXR, L femur XR, R femur XR, L & R knee XRs, XR pelvis, CT pelvis, CTH w/o e/o acute fracture or intracranial pathology  - fluids  - encourage PO intake      SURY on CKD (resolved)  Baseline cr around 2.4. p/w BUN/Cr ratio 81/3.6. UA w/ e/o ketones, so may be related to

## 2025-06-05 NOTE — CARE COORDINATION
DISCHARGE PLANNING:    Chart reviewed.    Patient is from home with brother and sister.      Patient admitted with FTT.  GI, Ortho, Palliative and Nephrology following.  Patient currently NPO.  Therapy recommending SNF.  CM left SNF list yesterday for family to review and select preferences.  CM sent referrals today per patient and family preferences.     Plan is SNF at discharge.  Referrals sent.  Will need precert.    Electronically signed by ANKIT Montes, RN Case Manager on 6/5/2025 at 1:58 PM

## 2025-06-05 NOTE — PROGRESS NOTES
Consult received, labs, xrs and CTs reviewed  Full consult to follow tomorrow    Agree with empiric treatment for gout  May be WBAT on both LEs      MANI RIOS MD  [facility]  [phone] (cell)

## 2025-06-05 NOTE — CONSULTS
Attending   Consult Note        Reason for Consult:  bilat knee pain  Requesting Physician: Jeni Obrien MD  Date of Service: 6/5/2025 6:40 AM    CHIEF COMPLAINT:  As Above    History Obtained From:  patient, electronic medical record    HISTORY OF PRESENT ILLNESS:                The patient is a 75 y.o. male who presents with above chief complaint.  Reports gradual increase in bilateral knee pain without acute trauma. History of bilat LINNEA in past. Increasing overall weakness, in ER noted to have elevated Uric Acid but no obvious acute infectious signs.    Past Medical History:        Diagnosis Date    Acute renal failure (ARF) 12/29/2019    Appetite absent 1/26/2022    Bradycardia 3/5/2020    CHB (complete heart block) (HCC) 10/27/2021    DDD (degenerative disc disease), lumbosacral 10/10/2013    Hand pain, right 7/16/2012    Hypertension 5/18/2010    Insomnia 7/16/2012    Low back pain 5/18/2010    Neck fracture (HCC)     Neuropathy 7/16/2012    Non-seasonal allergic rhinitis 10/27/2021    Pacemaker 1/3/2020    Medtronic dual chamber MRI pacer implanted by Dr Pascal 1/3/20 at [place] for symptomatic chelita    Phlebitis of right arm 1/6/2020     Past Surgical History:        Procedure Laterality Date    FRACTURE SURGERY      HIP SURGERY      left    NECK SURGERY      PACEMAKER PLACEMENT      TOTAL HIP ARTHROPLASTY      right         Medications Prior to Admission:   Prior to Admission medications    Medication Sig Start Date End Date Taking? Authorizing Provider   sodium bicarbonate 650 MG tablet Take 1 tablet by mouth 3 times daily   Yes ProviderBrayan MD   metoprolol succinate (TOPROL XL) 50 MG extended release tablet TAKE 1 TABLET EVERY DAY 5/19/25  Yes Devon Liu DO   mirtazapine (REMERON) 15 MG tablet Take 1 tablet by mouth nightly 4/10/25  Yes Logan Chapman MD   diazePAM (VALIUM) 5 MG tablet Take 1 tablet by mouth every 12 hours as needed for Anxiety or Sleep for up to 90 days.  either knee, either femur, or pelvis.   2. Probable bone infarcts are present in the distal femur/distal tibia bilaterally.   3. Bilateral knee joint effusion is above.   4. Please see above for other incidental findings..      Electronically signed by Shaheen Peterson DO      XR FEMUR RIGHT (MIN 2 VIEWS)   Final Result   1. No acute osseous findings in either knee, either femur, or pelvis.   2. Probable bone infarcts are present in the distal femur/distal tibia bilaterally.   3. Bilateral knee joint effusion is above.   4. Please see above for other incidental findings..      Electronically signed by Shaheen Peterson, DO      XR KNEE RIGHT (1-2 VIEWS)   Final Result   1. No acute osseous findings in either knee, either femur, or pelvis.   2. Probable bone infarcts are present in the distal femur/distal tibia bilaterally.   3. Bilateral knee joint effusion is above.   4. Please see above for other incidental findings..      Electronically signed by Shaheen Peterson DO      CT HEAD WO CONTRAST   Final Result   1.  No acute intracranial abnormality.          Electronically signed by Trevor Nunez             IMPRESSION/RECOMMENDATIONS:    Assessment: Bilat knee pain, most likely secondary to combination of OA, gout, and chronic bony infarcts    Plan:  1) Patient states that he actually feels better after the medications were initiated. Agree with supportive gout and arthritis care, may continue WBAT with PT    2) If not demonstrating improvement, additional diagnostic options could include aspiration or MRI depending on if showing more signs of mechanical vs infx trend    3) Can f/u in office with one of my adult recon partners at [facility]. Appts can be made outpatient at [phone]        Thank you for the opportunity to consult on this patient.    MANI RIOS MD  [phone] (cell)  [phone] (appointment scheduling)      It should be noted that a total time spent with >50% of the time coordinating patient

## 2025-06-05 NOTE — PLAN OF CARE
Problem: Discharge Planning  Goal: Discharge to home or other facility with appropriate resources  Outcome: Progressing     Problem: Pain  Goal: Verbalizes/displays adequate comfort level or baseline comfort level  Outcome: Progressing  Flowsheets (Taken 6/4/2025 2216)  Verbalizes/displays adequate comfort level or baseline comfort level:   Encourage patient to monitor pain and request assistance   Assess pain using appropriate pain scale   Administer analgesics based on type and severity of pain and evaluate response   Implement non-pharmacological measures as appropriate and evaluate response   Consider cultural and social influences on pain and pain management   Notify Licensed Independent Practitioner if interventions unsuccessful or patient reports new pain     Problem: Skin/Tissue Integrity  Goal: Skin integrity remains intact  Description: 1.  Monitor for areas of redness and/or skin breakdown2.  Assess vascular access sites hourly3.  Every 4-6 hours minimum:  Change oxygen saturation probe site4.  Every 4-6 hours:  If on nasal continuous positive airway pressure, respiratory therapy assess nares and determine need for appliance change or resting period  Outcome: Progressing  Flowsheets (Taken 6/4/2025 2216)  Skin Integrity Remains Intact:   Monitor for areas of redness and/or skin breakdown   Turn and reposition as indicated   Monitor skin under medical devices     Problem: Safety - Adult  Goal: Free from fall injury  Outcome: Progressing  Flowsheets (Taken 6/4/2025 2216)  Free From Fall Injury:   Based on caregiver fall risk screen, instruct family/caregiver to ask for assistance with transferring infant if caregiver noted to have fall risk factors   Instruct family/caregiver on patient safety

## 2025-06-05 NOTE — PROGRESS NOTES
Occupational Therapy  [address]upational Therapy Treatment    Name: Rakan Oakley Jr.  : 1950  MRN: [id number]  Date of Service: 2025    Discharge Recommendations:  Subacute/Skilled Nursing Facility  OT Equipment Recommendations  Equipment Needed: No       Patient Diagnosis(es): The encounter diagnosis was Recurrent falls.  Past Medical History:  has a past medical history of Acute renal failure (ARF), Appetite absent, Bradycardia, CHB (complete heart block) ([facility]), DDD (degenerative disc disease), lumbosacral, Hand pain, right, Hypertension, Insomnia, Low back pain, Neck fracture (HCC), Neuropathy, Non-seasonal allergic rhinitis, Pacemaker, and Phlebitis of right arm.  Past Surgical History:  has a past surgical history that includes Total hip arthroplasty; hip surgery; Neck surgery; fracture surgery; and pacemaker placement.    Treatment Diagnosis: Impaired functional mobility and ADL      Assessment  Performance deficits / Impairments: Decreased functional mobility ;Decreased ADL status;Decreased ROM;Decreased strength;Decreased endurance  Assessment: Pt reports that pain is alittle better today.  Pt able to sit edge of bed with SBA.  Pt did not attempt to stand or even put feet on floor.  Cont OT tx per plan of care.  Treatment Diagnosis: Impaired functional mobility and ADL  Prognosis: Fair  REQUIRES OT FOLLOW-UP: Yes  Activity Tolerance  Activity Tolerance: Patient limited by pain  Activity Tolerance Comments: Pt able to sit edge of bed today.     Plan  Occupational Therapy Plan  Times Per Week: 2-5  Current Treatment Recommendations: Strengthening, ROM, Balance training, Functional mobility training, Endurance training, Self-Care / ADL, Equipment evaluation, education, & procurement    Restrictions  Restrictions/Precautions  Activity Level: Up with Assist  Position Activity Restriction  Other Position/Activity Restrictions: up with

## 2025-06-05 NOTE — PROGRESS NOTES
by provider] losartan (COZAAR) tablet 25 mg, 25 mg, Oral, Daily  mirtazapine (REMERON) tablet 15 mg, 15 mg, Oral, Nightly  sodium chloride flush 0.9 % injection 5-40 mL, 5-40 mL, IntraVENous, 2 times per day  sodium chloride flush 0.9 % injection 5-40 mL, 5-40 mL, IntraVENous, PRN  0.9 % sodium chloride infusion, , IntraVENous, PRN  ondansetron (ZOFRAN-ODT) disintegrating tablet 4 mg, 4 mg, Oral, Q8H PRN **OR** ondansetron (ZOFRAN) injection 4 mg, 4 mg, IntraVENous, Q6H PRN  polyethylene glycol (GLYCOLAX) packet 17 g, 17 g, Oral, Daily PRN  acetaminophen (TYLENOL) tablet 650 mg, 650 mg, Oral, Q6H PRN **OR** acetaminophen (TYLENOL) suppository 650 mg, 650 mg, Rectal, Q6H PRN  heparin (porcine) injection 5,000 Units, 5,000 Units, SubCUTAneous, 3 times per day  diazePAM (VALIUM) tablet 5 mg, 5 mg, Oral, Q12H PRN  metoprolol succinate (TOPROL XL) extended release tablet 50 mg, 50 mg, Oral, Daily    Vitals :     Vitals:    06/05/25 0350   BP: (!) 158/82   Pulse: 83   Resp: 16   Temp: 97.3 °F (36.3 °C)   SpO2: 98%          Physical Examination :     appearance: Awake but confused  Respiratory: no distress  Cardiovascular: no visibly  raised JVD, Edema none  Abdomen: -  soft       Labs :     CBC:   Recent Labs     06/03/25  1605 06/04/25  0522 06/05/25  0411   WBC 7.8 7.1 5.5   HGB 14.4 13.9 13.2*   HCT 41.8 41.0 39.0*    315 see below     BMP:    Recent Labs     06/03/25  1605 06/04/25  0522 06/05/25 0411    138 137   K 4.3 4.2 see below    102 103   CO2 23 16* 21   BUN 81* 77* 65*   CREATININE 3.6* 3.1* 2.3*   GLUCOSE 109* 88 217*   MG  --  2.52* 2.47*   PHOS  --  4.0 2.9     Lab Results   Component Value Date/Time    COLORU Yellow 06/03/2025 05:50 PM    NITRU Negative 06/03/2025 05:50 PM    GLUCOSEU Negative 06/03/2025 05:50 PM    GLUCOSEU Negative 01/29/2012 06:20 PM    KETUA TRACE 06/03/2025 05:50 PM    UROBILINOGEN 1.0 06/03/2025 05:50 PM    BILIRUBINUR SMALL 06/03/2025 05:50 PM         ----------------------------------------------------------  Please call with questions at      24 Hrs Answering service [phone]  Perfect serve, or cell phone 7 am - 5pm  Monico Logan MD  [email or web address]

## 2025-06-06 PROBLEM — M25.562 ACUTE PAIN OF LEFT KNEE: Status: ACTIVE | Noted: 2025-06-06

## 2025-06-06 PROBLEM — Z51.5 ENCOUNTER FOR PALLIATIVE CARE: Status: ACTIVE | Noted: 2025-06-06

## 2025-06-06 PROBLEM — Z71.89 ADVANCE CARE PLANNING: Status: ACTIVE | Noted: 2025-06-06

## 2025-06-06 LAB
ALBUMIN SERPL-MCNC: 2.9 G/DL (ref 3.4–5)
ANION GAP SERPL CALCULATED.3IONS-SCNC: 11 MMOL/L (ref 3–16)
BASOPHILS # BLD: 0 K/UL (ref 0–0.2)
BASOPHILS NFR BLD: 0.3 %
BUN SERPL-MCNC: 63 MG/DL (ref 7–20)
CALCIUM SERPL-MCNC: 8.6 MG/DL (ref 8.3–10.6)
CHLORIDE SERPL-SCNC: 103 MMOL/L (ref 99–110)
CO2 SERPL-SCNC: 23 MMOL/L (ref 21–32)
CREAT SERPL-MCNC: 2 MG/DL (ref 0.8–1.3)
DEPRECATED RDW RBC AUTO: 11.7 % (ref 12.4–15.4)
EOSINOPHIL # BLD: 0 K/UL (ref 0–0.6)
EOSINOPHIL NFR BLD: 0 %
GFR SERPLBLD CREATININE-BSD FMLA CKD-EPI: 34 ML/MIN/{1.73_M2}
GLUCOSE SERPL-MCNC: 170 MG/DL (ref 70–99)
HCT VFR BLD AUTO: 36.8 % (ref 40.5–52.5)
HGB BLD-MCNC: 12.7 G/DL (ref 13.5–17.5)
LYMPHOCYTES # BLD: 0.9 K/UL (ref 1–5.1)
LYMPHOCYTES NFR BLD: 11.5 %
MAGNESIUM SERPL-MCNC: 2.42 MG/DL (ref 1.8–2.4)
MCH RBC QN AUTO: 31.1 PG (ref 26–34)
MCHC RBC AUTO-ENTMCNC: 34.5 G/DL (ref 31–36)
MCV RBC AUTO: 90.3 FL (ref 80–100)
MONOCYTES # BLD: 0.4 K/UL (ref 0–1.3)
MONOCYTES NFR BLD: 4.5 %
NEUTROPHILS # BLD: 6.7 K/UL (ref 1.7–7.7)
NEUTROPHILS NFR BLD: 83.7 %
PHOSPHATE SERPL-MCNC: 2.1 MG/DL (ref 2.5–4.9)
PLATELET # BLD AUTO: 367 K/UL (ref 135–450)
PMV BLD AUTO: 9.6 FL (ref 5–10.5)
POTASSIUM SERPL-SCNC: 4.2 MMOL/L (ref 3.5–5.1)
RBC # BLD AUTO: 4.08 M/UL (ref 4.2–5.9)
SODIUM SERPL-SCNC: 137 MMOL/L (ref 136–145)
WBC # BLD AUTO: 8 K/UL (ref 4–11)

## 2025-06-06 PROCEDURE — 80069 RENAL FUNCTION PANEL: CPT

## 2025-06-06 PROCEDURE — 6370000000 HC RX 637 (ALT 250 FOR IP): Performed by: INTERNAL MEDICINE

## 2025-06-06 PROCEDURE — 6370000000 HC RX 637 (ALT 250 FOR IP): Performed by: NURSE PRACTITIONER

## 2025-06-06 PROCEDURE — 2500000003 HC RX 250 WO HCPCS

## 2025-06-06 PROCEDURE — 85025 COMPLETE CBC W/AUTO DIFF WBC: CPT

## 2025-06-06 PROCEDURE — 6370000000 HC RX 637 (ALT 250 FOR IP)

## 2025-06-06 PROCEDURE — 99497 ADVNCD CARE PLAN 30 MIN: CPT | Performed by: NURSE PRACTITIONER

## 2025-06-06 PROCEDURE — 36415 COLL VENOUS BLD VENIPUNCTURE: CPT

## 2025-06-06 PROCEDURE — 6360000002 HC RX W HCPCS

## 2025-06-06 PROCEDURE — 1200000000 HC SEMI PRIVATE

## 2025-06-06 PROCEDURE — 83735 ASSAY OF MAGNESIUM: CPT

## 2025-06-06 RX ORDER — PREDNISONE 5 MG/1
TABLET ORAL
Qty: 37 TABLET | Refills: 0 | Status: SHIPPED | OUTPATIENT
Start: 2025-06-07 | End: 2025-06-18

## 2025-06-06 RX ORDER — PREDNISONE 20 MG/1
20 TABLET ORAL DAILY
Status: DISCONTINUED | OUTPATIENT
Start: 2025-06-09 | End: 2025-06-09 | Stop reason: HOSPADM

## 2025-06-06 RX ORDER — ALLOPURINOL 100 MG/1
50 TABLET ORAL DAILY
Status: DISCONTINUED | OUTPATIENT
Start: 2025-06-10 | End: 2025-06-09 | Stop reason: HOSPADM

## 2025-06-06 RX ORDER — NIFEDIPINE 30 MG
30 TABLET, EXTENDED RELEASE ORAL DAILY
Qty: 30 TABLET | Refills: 3 | Status: SHIPPED | OUTPATIENT
Start: 2025-06-07 | End: 2025-06-09 | Stop reason: HOSPADM

## 2025-06-06 RX ORDER — LOSARTAN POTASSIUM 25 MG/1
25 TABLET ORAL ONCE
Status: COMPLETED | OUTPATIENT
Start: 2025-06-06 | End: 2025-06-06

## 2025-06-06 RX ORDER — PREDNISONE 5 MG/1
5 TABLET ORAL DAILY
Status: DISCONTINUED | OUTPATIENT
Start: 2025-06-15 | End: 2025-06-09 | Stop reason: HOSPADM

## 2025-06-06 RX ORDER — LIDOCAINE 40 MG/G
CREAM TOPICAL PRN
Status: DISCONTINUED | OUTPATIENT
Start: 2025-06-06 | End: 2025-06-09 | Stop reason: HOSPADM

## 2025-06-06 RX ORDER — ALLOPURINOL 100 MG/1
50 TABLET ORAL DAILY
Qty: 30 TABLET | Refills: 3 | Status: SHIPPED | OUTPATIENT
Start: 2025-06-10

## 2025-06-06 RX ORDER — LOSARTAN POTASSIUM 50 MG/1
50 TABLET ORAL DAILY
Qty: 30 TABLET | Refills: 3 | Status: SHIPPED | OUTPATIENT
Start: 2025-06-07

## 2025-06-06 RX ORDER — NIFEDIPINE 30 MG
30 TABLET, EXTENDED RELEASE ORAL DAILY
Status: DISCONTINUED | OUTPATIENT
Start: 2025-06-06 | End: 2025-06-09

## 2025-06-06 RX ORDER — LOSARTAN POTASSIUM 50 MG/1
50 TABLET ORAL DAILY
Status: DISCONTINUED | OUTPATIENT
Start: 2025-06-07 | End: 2025-06-09 | Stop reason: HOSPADM

## 2025-06-06 RX ORDER — PREDNISONE 10 MG/1
10 TABLET ORAL DAILY
Status: DISCONTINUED | OUTPATIENT
Start: 2025-06-12 | End: 2025-06-09 | Stop reason: HOSPADM

## 2025-06-06 RX ORDER — PREDNISONE 20 MG/1
40 TABLET ORAL DAILY
Status: COMPLETED | OUTPATIENT
Start: 2025-06-06 | End: 2025-06-08

## 2025-06-06 RX ORDER — OXYCODONE AND ACETAMINOPHEN 5; 325 MG/1; MG/1
1 TABLET ORAL EVERY 6 HOURS PRN
Refills: 0 | Status: DISCONTINUED | OUTPATIENT
Start: 2025-06-06 | End: 2025-06-09 | Stop reason: HOSPADM

## 2025-06-06 RX ADMIN — METOPROLOL SUCCINATE 50 MG: 50 TABLET, EXTENDED RELEASE ORAL at 10:57

## 2025-06-06 RX ADMIN — DIAZEPAM 5 MG: 5 TABLET ORAL at 03:03

## 2025-06-06 RX ADMIN — PREDNISONE 40 MG: 20 TABLET ORAL at 10:57

## 2025-06-06 RX ADMIN — NIFEDIPINE 30 MG: 30 TABLET, EXTENDED RELEASE ORAL at 10:57

## 2025-06-06 RX ADMIN — LOSARTAN POTASSIUM 25 MG: 25 TABLET, FILM COATED ORAL at 14:20

## 2025-06-06 RX ADMIN — SODIUM CHLORIDE, PRESERVATIVE FREE 10 ML: 5 INJECTION INTRAVENOUS at 10:58

## 2025-06-06 RX ADMIN — SODIUM CHLORIDE, PRESERVATIVE FREE 10 ML: 5 INJECTION INTRAVENOUS at 20:38

## 2025-06-06 RX ADMIN — OXYCODONE HYDROCHLORIDE AND ACETAMINOPHEN 1 TABLET: 5; 325 TABLET ORAL at 11:42

## 2025-06-06 RX ADMIN — OXYCODONE AND ACETAMINOPHEN 1 TABLET: 5; 325 TABLET ORAL at 18:17

## 2025-06-06 RX ADMIN — HEPARIN SODIUM 5000 UNITS: 5000 INJECTION INTRAVENOUS; SUBCUTANEOUS at 22:34

## 2025-06-06 RX ADMIN — MIRTAZAPINE 15 MG: 15 TABLET, FILM COATED ORAL at 20:37

## 2025-06-06 ASSESSMENT — PAIN DESCRIPTION - LOCATION
LOCATION: LEG

## 2025-06-06 ASSESSMENT — PAIN DESCRIPTION - DESCRIPTORS
DESCRIPTORS: ACHING;THROBBING

## 2025-06-06 ASSESSMENT — PAIN DESCRIPTION - PAIN TYPE
TYPE: ACUTE PAIN

## 2025-06-06 ASSESSMENT — PAIN - FUNCTIONAL ASSESSMENT
PAIN_FUNCTIONAL_ASSESSMENT: PREVENTS OR INTERFERES SOME ACTIVE ACTIVITIES AND ADLS

## 2025-06-06 ASSESSMENT — PAIN DESCRIPTION - ORIENTATION
ORIENTATION: LEFT

## 2025-06-06 ASSESSMENT — PAIN DESCRIPTION - DIRECTION
RADIATING_TOWARDS: NO

## 2025-06-06 ASSESSMENT — PAIN DESCRIPTION - ONSET
ONSET: ON-GOING

## 2025-06-06 ASSESSMENT — PAIN SCALES - GENERAL
PAINLEVEL_OUTOF10: 9
PAINLEVEL_OUTOF10: 8
PAINLEVEL_OUTOF10: 8
PAINLEVEL_OUTOF10: 4
PAINLEVEL_OUTOF10: 9

## 2025-06-06 ASSESSMENT — PAIN DESCRIPTION - FREQUENCY
FREQUENCY: INTERMITTENT

## 2025-06-06 NOTE — PROGRESS NOTES
Ph: [phone], Fax: [phone]           [facility]               Reason for admission:                 Admitted for lightheadedness and dizziness with falls    Brief Summary :     Rakan Oakley Jr. is being seen by nephrology for acute kidney injury on chronic kidney disease.      Interval History and plan:      Blood pressure is high at 178 systolic  Resumed losartan yesterday  Off of IV fluids  Also on Toprol  Creatinine improved significantly to 2 which is close to his baseline  Potassium is stable at 4.2 with losartan  He is hepatitis C positive and GI following  He has ischemic toes  No significant pedal edema and has no new complaints at this time    Plan:    Increase losartan to 50 mg a day  Also start on nifedipine 30 mg a day to control blood pressure                   Assessment :     Chronic kidney disease stage IV: Baseline creatinine is close to 2.5.  Ultrasound of the kidney showed bilateral small size kidneys with echogenicity.  Also has a small stone.    SPEP, UPEP were negative for any M spike.  Hep C was positive and was supposed to see a gastroenterologist.  Echocardiogram showed EF of 50 to 60%.  He has history of hypertension.    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    Hypertension: He is on losartan and metoprolol at home.    Acute kidney injury: On arrival creatinine was 3.6 with a BUN of greater than 80.  This is higher than  his baseline.  It is likely prerenal etiology with poor oral intake, volume depletion.    Metabolic acidosis with urine ketones positive.    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      [facility] would like to thank Clay Morales MD   for opportunity to serve this patient      Please call with questions at-   24 Hrs Answering service [phone] or  7 am- 5 pm via Perfect serve or cell phone  Dr.Sudhir Lopez,  06/03/2025 05:50 PM    SERENE AGUILERA 06/03/2025 05:50 PM        ----------------------------------------------------------  Please call with questions at      24 Hrs Answering service [phone]  Perfect serve, or cell phone 7 am - 5pm  Primitivo Lopez MD  [email or web address]

## 2025-06-06 NOTE — DISCHARGE INSTRUCTIONS
Rakan Oakley Jr.,    You were admitted to the hospital for falls. It is important that you closely follow all discharge instructions.    Please schedule a follow-up appointment with your primary care physician, Logan Chapman, within 1 week.  Please schedule a follow-up appointment with your GI physician Dr. Marbin Rincon within 1 week.  ***    Thank you,    Internal Medicine Team  [facility]

## 2025-06-06 NOTE — DISCHARGE SUMMARY
INTERNAL MEDICINE DEPARTMENT AT THE [facility]  DISCHARGE SUMMARY    Patient ID: Rakan Oakley Jr.                                             Discharge Date: 6/9/2025   Patient's PCP: Logan Chapman MD                                          Discharge Physician: Ally att. providers found  Admit Date: 6/3/2025   Admitting Physician: Donna Swartz MD    DISCHARGE DIAGNOSES:  1-Acute gout flare  2-Failure to thrive  3-Hepatitis C    Hospital Course:  Adin Bledsoe Jr is a 75-year-old male with history of complete heart block s/p PPM, HTN, CKD4 2/2 FSGS, hepatitis C (untreated), who presented after 3-4 falls at home due to knee pain. Per the patient's sister, he had been having left knee pain and swelling for 3-4 days prior without any known trauma. His falls had occurred when standing from a seated position and were without loss of consciousness. Physical exam on presentation was notable for swelling of the left knee and difficulty lifting the left leg due to pain. Uric acid was markedly elevated at 14.2. CT of the knees was obtained and showed sclerotic lesions in both knees. Given his untreated hepatitis C and detectable viral load, there was concern for hepatitis C-associated osteosclerosis, however markers of bone turnover were WNL making this less likely. GI was consulted and recommended outpatient follow-up for hep C management. He was empirically started on predisone for an acute gout flare and orthopedic surgery was consulted. NSAIDs and colchicine were contraindicated given his CKD and thus not started. He had good response with prednisone and this was continued at discharge. He was also started on allopurinol prior to discharge.    On the date of discharge, the patient reported feeling stable. The patient was found to not be in any acute distress, with vital signs within normal limits, and no new abnormalities on physical examination. Further, the patient expressed appropriate understanding of,

## 2025-06-06 NOTE — DISCHARGE INSTR - COC
Continuity of Care Form    Patient Name: Rakan Oakley Jr.   :  1950  MRN:  [id number]    Admit date:  6/3/2025  Discharge date:  25      Code Status Order: Full Code   Advance Directives:     Admitting Physician:  Donna Swartz MD  PCP: Logan Chapman MD    Discharging Nurse: shant vasques  Discharging Hospital Unit/Room#: 6316/6316-01  Discharging Unit Phone Number: [phone]    Emergency Contact:   Extended Emergency Contact Information  Primary Emergency Contact: Beatrice Maxwell  Home Phone: [phone]  Relation: Brother/Sister  Secondary Emergency Contact: Crystal Oakley  Mobile Phone: [phone]  Relation: Child    Past Surgical History:  Past Surgical History:   Procedure Laterality Date    FRACTURE SURGERY      HIP SURGERY      left    NECK SURGERY      PACEMAKER PLACEMENT      TOTAL HIP ARTHROPLASTY      right       Immunization History:   Immunization History   Administered Date(s) Administered    COVID-19, MODERNA BLUE border, Primary or Immunocompromised, (age 12y+), IM, 100 mcg/0.5mL 2021, 04/15/2021, 2021    COVID-19, MODERNA Bivalent, (age 12y+), IM, 50 mcg/0.5 mL 10/05/2022    COVID-19, MODERNA, , (age 12y+), IM, 50mcg/0.5mL 10/04/2023, 2024    Influenza Virus Vaccine 2011, 2015    Influenza, FLUZONE High Dose, (age 65 y+), IM, Trivalent PF, 0.5mL 2014, 2017, 01/15/2019, 2020    Pneumococcal, PCV-13, PREVNAR 13, (age 6w+), IM, 0.5mL 05/10/2017    Pneumococcal, PPSV23, PNEUMOVAX 23, (age 2y+), SC/IM, 0.5mL 2009, 10/14/2014       Active Problems:  Patient Active Problem List   Diagnosis Code    Essential hypertension I10    Pacemaker Z95.0    Bradycardia R00.1    CHB (complete heart block) (HCC) I44.2    Vitamin B12 deficiency E53.8    Appetite absent R63.0    Primary osteoarthritis of both knees M17.0    Generalized anxiety disorder F41.1    Chronic renal disease, stage III (HCC) [665339] N18.30    BPH (benign  aspiration or MRI depending on if showing more signs of mechanical vs infx trend     3) Can f/u in office with one of my adult recon partners at [facility]. Appts can be made outpatient at [phone]    Physician Certification: I certify the above information and transfer of Rakan Oakley   is necessary for the continuing treatment of the diagnosis listed and that he requires Skilled Nursing Facility for less 30 days.     Update Admission H&P: No change in H&P    PHYSICIAN SIGNATURE:  Electronically signed by Jose Alberto Chan MD on 6/9/25 at 1:06 PM EDT

## 2025-06-06 NOTE — PROGRESS NOTES
Pt requested \"Our daily [employer]\" for daily reflection,  provided. No other needs identified for follow up.

## 2025-06-06 NOTE — CARE COORDINATION
Gomez spoke with daughter Crystal. They have spoken with [facility] and are waiting to hear back to set up a tour. They feel that [facility] is probably the best facility for the pt. GOMEZ will reach out to [facility] to help expedite the tour process.    GOMEZ left message for Johanna at [facility] re: setting up a tour with pt's family.      3:05 PM  GOMEZ spoke with Crystal. She is agreeable to [facility].     Precert will be started today.

## 2025-06-06 NOTE — PROGRESS NOTES
Internal Medicine Progress Note    Date: 6/6/2025   Patient: Rakan Oakley Jr.   Hospital Day: 3      CC: Knee Pain (Bilateral knee pain after a fall two days ago per squad. ) and Fall (Pt complains of frequent falls. Pt states his left knee has been giving out. )       Interval Hx   No acute events overnight, vital signs stable. Patient continues to endorse soreness in his legs but reports that it is doing much better.      HPI (from H&P):   Rakan Oakley Jr. is an 75 y.o. 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  Unfortunately 2 different histories were provided by the patient and his sister at bedside.  According to the patient, for the last several weeks he has become lightheaded and dizzy upon standing from a seated position.  He indicates his symptoms improved with sitting down and not moving. No LOC. The patient self denies chest pain, shortness of breath, abdominal pain, nausea, vomiting, headache, fever, diarrhea, or constipation.  He is shivering in the emergency department under numerous blankets but asked if he has felt cold recently, his sister states that \"it is just really cold in here.  He keeps his room warm in the house.\" Pt himself is unable to characterize how long he has felt cold/chills. However, he indicates that he has had poor p.o. intake for several weeks.  I asked specifically about knee pain and he notes his pain has been ongoing for several weeks.  When asked which knee hurts worse, he does not respond.  He denies recent night sweats but his sister Rosa states that he has been losing weight, but only for the past 3 days.      According to his sister Rosa, however, the patient has had difficulty standing due to left knee pain and swelling which has been ongoing for 3 or 4 days without known trauma.  Since that time, he has had several falls.  According to Rosa, he falls  immediately upon trying to stand from a seated position.  She denies loss of consciousness.  She notes that he typically falls to the side when she is not doing As he usually calls from another room that he fell.  Rosa notes that he has had almost nothing to eat or drink over the past 3 days but, prior to that, he was eating normally (which, for him, [place] 1 meal a day and a couple of snacks).  When asked if prior to a week ago, he was able to walk to the mailbox, she states \"he does not walk to the mailbox.\"  When I asked if he was able to walk to the kitchen, she states \"he does not walk to the kitchen.\"  I asked her to elaborate how far he can usually walk and she states \"he sits and watches TV.\"  It was difficult to elucidate his baseline abilities.  However, Rosa does note that Rakan Davila needs complete assistance with all ADLs including cooking, eating, and bathing, and has for a while, but unclear how long.  Rakan Davila manages his own medications and he admits that he may have missed a few doses.      Pt admits to smoking cigarettes. Initially he says he does not smoke, but then indicates he smoked a cigarette today. He did not reply when asked how long he has been smoking.      According to the patient sister, Rakan Davila is not  and does not have living parents.  She confirms that he has children and was able to provide the phone numbers of 2 of them, but states she does not know how many children he has.      Called and spoke with Jaja, one of his daughters. Last known normal was Sunday when his daughter Jaja called him; he was oriented x4 at that time. I asked her if there are other children in addition to Jaja and Janny but she states \"It's mostly that's us, but there's other kids that we don't know.\" Jaja does not have their contact information.       Objective     Vital Signs:  Patient Vitals for the past 8 hrs:   BP Temp Temp src Pulse Resp SpO2 Weight   06/06/25 0600 -- --

## 2025-06-06 NOTE — ACP (ADVANCE CARE PLANNING)
Advance Care Planning      Palliative Medicine Provider (MD/NP)  Advance Care Planning (ACP) Conversation      Date of Conversation: 06/06/25  The patient and/or authorized decision maker consented to a voluntary Advance Care Planning conversation.   Individuals present for the conversation:   Patient and Sister Rosa    Legal Healthcare Agent(s):    Primary Decision Maker: Crystal Oakley - Child - [phone]    Secondary Decision Maker: Jaja Oakley - Child - [phone]    Supplemental (Other) Decision Maker: Beatrice Maxwell - Brother/Sister - [phone]    ACP documents available in EMR prior to discussion:  -None    Primary Palliative Diagnosis(es):  CKD 4    Conversation Summary:  Met with pt with sister Rosa at the bedside. Pt was in agreement with completing the HCPOA today. He named his daughter Crystal as his agent, daughter Jaja as alternate agent, and sister Rosa as second alternate. I attempted to readdress code status but the pt was getting off topic and his phone rang multiple times with family members checking on him, so we did not complete the conversation.    Resuscitation Status:    Code Status: Full Code    Outcomes / Completed Documentation:  An explanation of advance directives and their importance was provided and the following forms completed:    -Power of  for Healthcare    If new document completed, original was provided to patient and/or family member.    Copy was placed for scanning into the [facility] EMR.      I spent 20 minutes providing separately identifiable ACP services with the patient and/or surrogate decision maker in a voluntary, in-person conversation discussing the patient's wishes and goals as detailed in the above note.       Cinthya Andrew, JACKY - CNP

## 2025-06-06 NOTE — CARE COORDINATION
3:09 PM  Reference Number  [id number]  Status  PENDED    Review Reason 1  Requires Medical Review    Message  [employer] vendor category.  Precert submitted precert pending CM aware  Electronically signed by Trisha Grewal on 6/6/2025 at 3:10 PM

## 2025-06-06 NOTE — PROGRESS NOTES
Complaining that he hasn't slept yet and wants something for sleep. Explained that it's too late to take something for sleep as he might be drowsy in the morning, but he is insisting to take it as he cannot sleep the natural way, or than not able to sleep at all until morning. Given with PRN valium p.o.

## 2025-06-06 NOTE — PLAN OF CARE
Problem: Discharge Planning  Goal: Discharge to home or other facility with appropriate resources  Outcome: Progressing     Problem: Pain  Goal: Verbalizes/displays adequate comfort level or baseline comfort level  Outcome: Progressing  Flowsheets (Taken 6/6/2025 0057)  Verbalizes/displays adequate comfort level or baseline comfort level:   Encourage patient to monitor pain and request assistance   Assess pain using appropriate pain scale   Administer analgesics based on type and severity of pain and evaluate response   Implement non-pharmacological measures as appropriate and evaluate response   Consider cultural and social influences on pain and pain management   Notify Licensed Independent Practitioner if interventions unsuccessful or patient reports new pain     Problem: Skin/Tissue Integrity  Goal: Skin integrity remains intact  Description: 1.  Monitor for areas of redness and/or skin breakdown2.  Assess vascular access sites hourly3.  Every 4-6 hours minimum:  Change oxygen saturation probe site4.  Every 4-6 hours:  If on nasal continuous positive airway pressure, respiratory therapy assess nares and determine need for appliance change or resting period  Outcome: Progressing  Flowsheets (Taken 6/6/2025 0057)  Skin Integrity Remains Intact: Monitor for areas of redness and/or skin breakdown     Problem: Safety - Adult  Goal: Free from fall injury  Outcome: Progressing  Flowsheets (Taken 6/6/2025 0057)  Free From Fall Injury:   Instruct family/caregiver on patient safety   Based on caregiver fall risk screen, instruct family/caregiver to ask for assistance with transferring infant if caregiver noted to have fall risk factors

## 2025-06-07 LAB
1,25(OH)2D3 SERPL-MCNC: 50.2 PG/ML (ref 19.9–79.3)
ALBUMIN SERPL-MCNC: 2.8 G/DL (ref 3.4–5)
ANION GAP SERPL CALCULATED.3IONS-SCNC: 11 MMOL/L (ref 3–16)
BASOPHILS # BLD: 0 K/UL (ref 0–0.2)
BASOPHILS NFR BLD: 0.1 %
BUN SERPL-MCNC: 69 MG/DL (ref 7–20)
CALCIUM SERPL-MCNC: 8.3 MG/DL (ref 8.3–10.6)
CHLORIDE SERPL-SCNC: 102 MMOL/L (ref 99–110)
CO2 SERPL-SCNC: 23 MMOL/L (ref 21–32)
CREAT SERPL-MCNC: 2 MG/DL (ref 0.8–1.3)
DEPRECATED RDW RBC AUTO: 11.5 % (ref 12.4–15.4)
EOSINOPHIL # BLD: 0 K/UL (ref 0–0.6)
EOSINOPHIL NFR BLD: 0 %
GFR SERPLBLD CREATININE-BSD FMLA CKD-EPI: 34 ML/MIN/{1.73_M2}
GLUCOSE SERPL-MCNC: 162 MG/DL (ref 70–99)
HCT VFR BLD AUTO: 36 % (ref 40.5–52.5)
HGB BLD-MCNC: 12.4 G/DL (ref 13.5–17.5)
LYMPHOCYTES # BLD: 1 K/UL (ref 1–5.1)
LYMPHOCYTES NFR BLD: 11.7 %
MAGNESIUM SERPL-MCNC: 2.38 MG/DL (ref 1.8–2.4)
MCH RBC QN AUTO: 31 PG (ref 26–34)
MCHC RBC AUTO-ENTMCNC: 34.6 G/DL (ref 31–36)
MCV RBC AUTO: 89.6 FL (ref 80–100)
MONOCYTES # BLD: 0.6 K/UL (ref 0–1.3)
MONOCYTES NFR BLD: 7.2 %
NEUTROPHILS # BLD: 7.1 K/UL (ref 1.7–7.7)
NEUTROPHILS NFR BLD: 81 %
PHOSPHATE SERPL-MCNC: 2.4 MG/DL (ref 2.5–4.9)
PLATELET # BLD AUTO: 381 K/UL (ref 135–450)
PMV BLD AUTO: 9.4 FL (ref 5–10.5)
POTASSIUM SERPL-SCNC: 4.2 MMOL/L (ref 3.5–5.1)
RBC # BLD AUTO: 4.01 M/UL (ref 4.2–5.9)
SODIUM SERPL-SCNC: 136 MMOL/L (ref 136–145)
WBC # BLD AUTO: 8.8 K/UL (ref 4–11)

## 2025-06-07 PROCEDURE — 6370000000 HC RX 637 (ALT 250 FOR IP)

## 2025-06-07 PROCEDURE — 6360000002 HC RX W HCPCS

## 2025-06-07 PROCEDURE — 1200000000 HC SEMI PRIVATE

## 2025-06-07 PROCEDURE — 2500000003 HC RX 250 WO HCPCS

## 2025-06-07 PROCEDURE — 36415 COLL VENOUS BLD VENIPUNCTURE: CPT

## 2025-06-07 PROCEDURE — 85025 COMPLETE CBC W/AUTO DIFF WBC: CPT

## 2025-06-07 PROCEDURE — 6370000000 HC RX 637 (ALT 250 FOR IP): Performed by: INTERNAL MEDICINE

## 2025-06-07 PROCEDURE — 80069 RENAL FUNCTION PANEL: CPT

## 2025-06-07 PROCEDURE — 83735 ASSAY OF MAGNESIUM: CPT

## 2025-06-07 RX ADMIN — MIRTAZAPINE 15 MG: 15 TABLET, FILM COATED ORAL at 19:52

## 2025-06-07 RX ADMIN — SODIUM CHLORIDE, PRESERVATIVE FREE 10 ML: 5 INJECTION INTRAVENOUS at 11:15

## 2025-06-07 RX ADMIN — HEPARIN SODIUM 5000 UNITS: 5000 INJECTION INTRAVENOUS; SUBCUTANEOUS at 16:33

## 2025-06-07 RX ADMIN — PREDNISONE 40 MG: 20 TABLET ORAL at 11:15

## 2025-06-07 RX ADMIN — NIFEDIPINE 30 MG: 30 TABLET, EXTENDED RELEASE ORAL at 11:15

## 2025-06-07 RX ADMIN — HEPARIN SODIUM 5000 UNITS: 5000 INJECTION INTRAVENOUS; SUBCUTANEOUS at 05:30

## 2025-06-07 RX ADMIN — METOPROLOL SUCCINATE 50 MG: 50 TABLET, EXTENDED RELEASE ORAL at 11:14

## 2025-06-07 RX ADMIN — SODIUM CHLORIDE, PRESERVATIVE FREE 10 ML: 5 INJECTION INTRAVENOUS at 19:52

## 2025-06-07 RX ADMIN — HEPARIN SODIUM 5000 UNITS: 5000 INJECTION INTRAVENOUS; SUBCUTANEOUS at 23:30

## 2025-06-07 RX ADMIN — OXYCODONE AND ACETAMINOPHEN 1 TABLET: 5; 325 TABLET ORAL at 11:20

## 2025-06-07 RX ADMIN — OXYCODONE AND ACETAMINOPHEN 1 TABLET: 5; 325 TABLET ORAL at 16:32

## 2025-06-07 RX ADMIN — LOSARTAN POTASSIUM 50 MG: 50 TABLET, FILM COATED ORAL at 11:15

## 2025-06-07 ASSESSMENT — PAIN SCALES - GENERAL
PAINLEVEL_OUTOF10: 10
PAINLEVEL_OUTOF10: 8
PAINLEVEL_OUTOF10: 8
PAINLEVEL_OUTOF10: 6
PAINLEVEL_OUTOF10: 8

## 2025-06-07 ASSESSMENT — PAIN DESCRIPTION - LOCATION
LOCATION: KNEE

## 2025-06-07 ASSESSMENT — PAIN DESCRIPTION - ORIENTATION
ORIENTATION: LEFT

## 2025-06-07 NOTE — PROGRESS NOTES
Ph: [phone], Fax: [phone]           [facility]               Reason for admission:                 Admitted for lightheadedness and dizziness with falls    Brief Summary :     Rakan Oakley Jr. is being seen by nephrology for acute kidney injury on chronic kidney disease.      Interval History and plan:      Seen in room sleepy  Blood pressure is stable now with the increased dose of losartan  No edema  On room air  Creatinine continues to be stable with higher dose of losartan  Tolerating well with potassium of 4.2  Also on low-dose nifedipine  Plan:    Continue to monitor the blood pressure  Blood pressure may go down further if that happens we will hold off on nifedipine  Monitor electrolytes with losartan on board                   Assessment :     Chronic kidney disease stage IV: Baseline creatinine is close to 2.5.  Ultrasound of the kidney showed bilateral small size kidneys with echogenicity.  Also has a small stone.    SPEP, UPEP were negative for any M spike.  Hep C was positive and was supposed to see a gastroenterologist.  Echocardiogram showed EF of 50 to 60%.  He has history of hypertension.    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    Hypertension: He is on losartan and metoprolol at home.    Acute kidney injury: On arrival creatinine was 3.6 with a BUN of greater than 80.  This is higher than  his baseline.  It is likely prerenal etiology with poor oral intake, volume depletion.    Metabolic acidosis with urine ketones positive.    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      [facility] would like to thank Bo Cartagena MD   for opportunity to serve this patient      Please call with questions at-   24 Hrs Answering service [phone] or  7 am- 5 pm via Perfect serve or cell phone  Dr.Sudhir John MD       HPI :     Rakan Oakley Jr. is a 75 y.o. male  4.3 4.2 4.2     --  103 102   CO2 21  --  23 23   BUN 65*  --  63* 69*   CREATININE 2.3*  --  2.0* 2.0*   GLUCOSE 217*  --  170* 162*   MG 2.47*  --  2.42* 2.38   PHOS 2.9  --  2.1* 2.4*     Lab Results   Component Value Date/Time    COLORU Yellow 06/03/2025 05:50 PM    NITRU Negative 06/03/2025 05:50 PM    GLUCOSEU Negative 06/03/2025 05:50 PM    GLUCOSEU Negative 01/29/2012 06:20 PM    KETUA TRACE 06/03/2025 05:50 PM    UROBILINOGEN 1.0 06/03/2025 05:50 PM    BILIRUBINUR SMALL 06/03/2025 05:50 PM        ----------------------------------------------------------  Please call with questions at      24 Hrs Answering service [phone]  Perfect serve, or cell phone 7 am - 5pm  Primitivo Lopez MD  [facility]

## 2025-06-07 NOTE — PROGRESS NOTES
Internal Medicine Progress Note    Date: 6/7/2025   Patient: Rakan Oakley Jr.   Hospital Day: 4      CC: Knee Pain (Bilateral knee pain after a fall two days ago per squad. ) and Fall (Pt complains of frequent falls. Pt states his left knee has been giving out. )       Interval Hx   No acute events overnight, vital signs stable. Patient continues to endorse soreness in his legs but reports that it is doing much better since starting the steroids.    Precert Pending    HPI (from H&P):   Rakan Oakley Jr. is an 75 y.o. 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  Unfortunately 2 different histories were provided by the patient and his sister at bedside.  According to the patient, for the last several weeks he has become lightheaded and dizzy upon standing from a seated position.  He indicates his symptoms improved with sitting down and not moving. No LOC. The patient self denies chest pain, shortness of breath, abdominal pain, nausea, vomiting, headache, fever, diarrhea, or constipation.  He is shivering in the emergency department under numerous blankets but asked if he has felt cold recently, his sister states that \"it is just really cold in here.  He keeps his room warm in the house.\" Pt himself is unable to characterize how long he has felt cold/chills. However, he indicates that he has had poor p.o. intake for several weeks.  I asked specifically about knee pain and he notes his pain has been ongoing for several weeks.  When asked which knee hurts worse, he does not respond.  He denies recent night sweats but his sister Rosa states that he has been losing weight, but only for the past 3 days.      According to his sister Rosa, however, the patient has had difficulty standing due to left knee pain and swelling which has been ongoing for 3 or 4 days without known trauma.  Since that time, he has

## 2025-06-07 NOTE — PLAN OF CARE
Problem: Discharge Planning  Goal: Discharge to home or other facility with appropriate resources  6/7/2025 0054 by Elvis Levin, RN  Outcome: Progressing    Patient will go to SNF facility once medical stable.     Problem: Pain  Goal: Verbalizes/displays adequate comfort level or baseline comfort level  6/7/2025 0054 by Elvis Levin, RN  Outcome: Progressing    Denies pain. Will monitor     Problem: Safety - Adult  Goal: Free from fall injury  6/7/2025 0054 by Elvis Levin, RN  Outcome: Progressing    Bed alarm on, call lights within reach. Patient calls appropriately.

## 2025-06-08 LAB
ALBUMIN SERPL-MCNC: 2.7 G/DL (ref 3.4–5)
ANION GAP SERPL CALCULATED.3IONS-SCNC: 11 MMOL/L (ref 3–16)
BACTERIA BLD CULT ORG #2: NORMAL
BACTERIA BLD CULT: NORMAL
BASOPHILS # BLD: 0 K/UL (ref 0–0.2)
BASOPHILS NFR BLD: 0.2 %
BUN SERPL-MCNC: 66 MG/DL (ref 7–20)
CALCIUM SERPL-MCNC: 8.4 MG/DL (ref 8.3–10.6)
CHLORIDE SERPL-SCNC: 105 MMOL/L (ref 99–110)
CO2 SERPL-SCNC: 19 MMOL/L (ref 21–32)
CREAT SERPL-MCNC: 2 MG/DL (ref 0.8–1.3)
DEPRECATED RDW RBC AUTO: 11.9 % (ref 12.4–15.4)
EOSINOPHIL # BLD: 0 K/UL (ref 0–0.6)
EOSINOPHIL NFR BLD: 0.1 %
GFR SERPLBLD CREATININE-BSD FMLA CKD-EPI: 34 ML/MIN/{1.73_M2}
GLUCOSE SERPL-MCNC: 143 MG/DL (ref 70–99)
HCT VFR BLD AUTO: 41 % (ref 40.5–52.5)
HGB BLD-MCNC: 13.9 G/DL (ref 13.5–17.5)
LYMPHOCYTES # BLD: 1.2 K/UL (ref 1–5.1)
LYMPHOCYTES NFR BLD: 14.9 %
MAGNESIUM SERPL-MCNC: 2.47 MG/DL (ref 1.8–2.4)
MCH RBC QN AUTO: 30.9 PG (ref 26–34)
MCHC RBC AUTO-ENTMCNC: 34 G/DL (ref 31–36)
MCV RBC AUTO: 90.8 FL (ref 80–100)
MONOCYTES # BLD: 0.7 K/UL (ref 0–1.3)
MONOCYTES NFR BLD: 8.5 %
NEUTROPHILS # BLD: 6.3 K/UL (ref 1.7–7.7)
NEUTROPHILS NFR BLD: 76.3 %
PHOSPHATE SERPL-MCNC: 2.6 MG/DL (ref 2.5–4.9)
PLATELET # BLD AUTO: 346 K/UL (ref 135–450)
PMV BLD AUTO: 9.4 FL (ref 5–10.5)
POTASSIUM SERPL-SCNC: 4.9 MMOL/L (ref 3.5–5.1)
RBC # BLD AUTO: 4.51 M/UL (ref 4.2–5.9)
SODIUM SERPL-SCNC: 135 MMOL/L (ref 136–145)
WBC # BLD AUTO: 8.3 K/UL (ref 4–11)

## 2025-06-08 PROCEDURE — 6370000000 HC RX 637 (ALT 250 FOR IP)

## 2025-06-08 PROCEDURE — 36415 COLL VENOUS BLD VENIPUNCTURE: CPT

## 2025-06-08 PROCEDURE — 85025 COMPLETE CBC W/AUTO DIFF WBC: CPT

## 2025-06-08 PROCEDURE — 83735 ASSAY OF MAGNESIUM: CPT

## 2025-06-08 PROCEDURE — 6360000002 HC RX W HCPCS

## 2025-06-08 PROCEDURE — 2500000003 HC RX 250 WO HCPCS

## 2025-06-08 PROCEDURE — 6370000000 HC RX 637 (ALT 250 FOR IP): Performed by: INTERNAL MEDICINE

## 2025-06-08 PROCEDURE — 1200000000 HC SEMI PRIVATE

## 2025-06-08 PROCEDURE — 80069 RENAL FUNCTION PANEL: CPT

## 2025-06-08 RX ADMIN — OXYCODONE AND ACETAMINOPHEN 1 TABLET: 5; 325 TABLET ORAL at 14:06

## 2025-06-08 RX ADMIN — SODIUM CHLORIDE, PRESERVATIVE FREE 10 ML: 5 INJECTION INTRAVENOUS at 10:10

## 2025-06-08 RX ADMIN — OXYCODONE AND ACETAMINOPHEN 1 TABLET: 5; 325 TABLET ORAL at 06:09

## 2025-06-08 RX ADMIN — SODIUM CHLORIDE, PRESERVATIVE FREE 10 ML: 5 INJECTION INTRAVENOUS at 21:25

## 2025-06-08 RX ADMIN — HEPARIN SODIUM 5000 UNITS: 5000 INJECTION INTRAVENOUS; SUBCUTANEOUS at 14:06

## 2025-06-08 RX ADMIN — NIFEDIPINE 30 MG: 30 TABLET, EXTENDED RELEASE ORAL at 10:10

## 2025-06-08 RX ADMIN — OXYCODONE AND ACETAMINOPHEN 1 TABLET: 5; 325 TABLET ORAL at 21:24

## 2025-06-08 RX ADMIN — PREDNISONE 40 MG: 20 TABLET ORAL at 10:10

## 2025-06-08 RX ADMIN — METOPROLOL SUCCINATE 50 MG: 50 TABLET, EXTENDED RELEASE ORAL at 10:10

## 2025-06-08 RX ADMIN — HEPARIN SODIUM 5000 UNITS: 5000 INJECTION INTRAVENOUS; SUBCUTANEOUS at 21:37

## 2025-06-08 RX ADMIN — HEPARIN SODIUM 5000 UNITS: 5000 INJECTION INTRAVENOUS; SUBCUTANEOUS at 06:02

## 2025-06-08 RX ADMIN — LOSARTAN POTASSIUM 50 MG: 50 TABLET, FILM COATED ORAL at 10:10

## 2025-06-08 RX ADMIN — MIRTAZAPINE 15 MG: 15 TABLET, FILM COATED ORAL at 21:25

## 2025-06-08 ASSESSMENT — PAIN SCALES - GENERAL
PAINLEVEL_OUTOF10: 7
PAINLEVEL_OUTOF10: 8
PAINLEVEL_OUTOF10: 9

## 2025-06-08 ASSESSMENT — PAIN - FUNCTIONAL ASSESSMENT
PAIN_FUNCTIONAL_ASSESSMENT: ACTIVITIES ARE NOT PREVENTED

## 2025-06-08 ASSESSMENT — PAIN DESCRIPTION - DESCRIPTORS
DESCRIPTORS: ACHING
DESCRIPTORS: ACHING;DISCOMFORT
DESCRIPTORS: ACHING

## 2025-06-08 ASSESSMENT — PAIN DESCRIPTION - LOCATION
LOCATION: KNEE

## 2025-06-08 ASSESSMENT — PAIN SCALES - WONG BAKER: WONGBAKER_NUMERICALRESPONSE: NO HURT

## 2025-06-08 ASSESSMENT — PAIN DESCRIPTION - ORIENTATION
ORIENTATION: RIGHT;LEFT
ORIENTATION: LEFT
ORIENTATION: RIGHT

## 2025-06-08 NOTE — PROGRESS NOTES
Pts sisters voicing that they are upset with pts level of care and that pt hasn't had therapy on the weekend or been out of bed and walking down the fischer. Pt assisted by sister up to chair. RN informed charge rn of family concerns. Pt resting up in chair. White board updated on activity level. Chair alarm remains on. Pt given prn pain medication for knee pain. Call light within reach. This RN verbalized understand of families concerns and encouraged pt to ask questions and advocate for himself if he feels he isn't being heard. RN spent time listening to the families concerns about lack of therapy and informed pt and family of the pt/ot notes. Charge RN made aware of the situation as well.

## 2025-06-08 NOTE — PROGRESS NOTES
Internal Medicine Progress Note    Date: 6/8/2025   Patient: Rakan Oakley Jr.   Hospital Day: 5      CC: Knee Pain (Bilateral knee pain after a fall two days ago per squad. ) and Fall (Pt complains of frequent falls. Pt states his left knee has been giving out. )       Interval Hx   No acute events overnight, vital signs stable. Patient continues to endorse soreness in his legs but reports that it is doing much better since starting the steroids.    PT/OT recommend SNF and is pending precert to [place]    Plan  Continue teroids  - PT/OT  - precert pending  - nephro on board due to CKD      HPI (from H&P):   Rakan Oakley Jr. is an 75 y.o. 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  Unfortunately 2 different histories were provided by the patient and his sister at bedside.  According to the patient, for the last several weeks he has become lightheaded and dizzy upon standing from a seated position.  He indicates his symptoms improved with sitting down and not moving. No LOC. The patient self denies chest pain, shortness of breath, abdominal pain, nausea, vomiting, headache, fever, diarrhea, or constipation.  He is shivering in the emergency department under numerous blankets but asked if he has felt cold recently, his sister states that \"it is just really cold in here.  He keeps his room warm in the house.\" Pt himself is unable to characterize how long he has felt cold/chills. However, he indicates that he has had poor p.o. intake for several weeks.  I asked specifically about knee pain and he notes his pain has been ongoing for several weeks.  When asked which knee hurts worse, he does not respond.  He denies recent night sweats but his sister Rosa states that he has been losing weight, but only for the past 3 days.      According to his sister Rosa, however, the patient has had difficulty  Medicine Resident  Contact via [facility]        Medicine Attending  I personally saw the patient and made/approved the management plan and take responsibility for the patient management. I have personally examined the patient independently. I have reviewed the patient's available data,including medical history and recent test results. Reviewed and discussed note as documented by medical resident    Electronically signed by Bo Cartagena MD on 6/8/2025

## 2025-06-08 NOTE — PLAN OF CARE
Problem: Discharge Planning  Goal: Discharge to home or other facility with appropriate resources  Outcome: Progressing    Patient will go to SNF awaiting pre-certification.     Problem: Safety - Adult  Goal: Free from fall injury  Outcome: Progressing    Bed alarm on, call lights within reach, patient calls appropriately.

## 2025-06-08 NOTE — PLAN OF CARE
Problem: Skin/Tissue Integrity  Goal: Skin integrity remains intact  Description: 1.  Monitor for areas of redness and/or skin breakdown2.  Assess vascular access sites hourly3.  Every 4-6 hours minimum:  Change oxygen saturation probe site4.  Every 4-6 hours:  If on nasal continuous positive airway pressure, respiratory therapy assess nares and determine need for appliance change or resting period  Outcome: Progressing  Flowsheets  Taken 6/8/2025 1428  Skin Integrity Remains Intact: Monitor for areas of redness and/or skin breakdown  Taken 6/8/2025 0725  Skin Integrity Remains Intact: Monitor for areas of redness and/or skin breakdown  Note: Pt being turned and repositioned every two hours and as needed. Pillows and wedges used to reposition pt off of bony prominences. Inc of urine so mepilex on coccyx removed. Zinc cream applied to area on coccyx

## 2025-06-08 NOTE — PROGRESS NOTES
Ph: [phone], Fax: [phone]           [facility]               Reason for admission:                 Admitted for lightheadedness and dizziness with falls    Brief Summary :     Rakan Oakley Jr. is being seen by nephrology for acute kidney injury on chronic kidney disease.      Interval History and plan:      Alert and oriented  No edema  Creatinine continues to be stable at 2  CO2 on the low side   On RA  Blood pressure generally well-controlled except for this morning before patient's  Plan:    No indication for blood pressure medication changes  He is on appropriate medication for some degree of CKD  Continue to follow the trend of blood pressure may need to go up on the dose of losartan further                   Assessment :     Chronic kidney disease stage IV: Baseline creatinine is close to 2.5.  Ultrasound of the kidney showed bilateral small size kidneys with echogenicity.  Also has a small stone.    SPEP, UPEP were negative for any M spike.  Hep C was positive and was supposed to see a gastroenterologist.  Echocardiogram showed EF of 50 to 60%.  He has history of hypertension.    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    Hypertension: He is on losartan and metoprolol at home.    Acute kidney injury: On arrival creatinine was 3.6 with a BUN of greater than 80.  This is higher than  his baseline.  It is likely prerenal etiology with poor oral intake, volume depletion.    Metabolic acidosis with urine ketones positive.    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      [facility] would like to thank Bo Cartagena MD   for opportunity to serve this patient      Please call with questions at-   24 Hrs Answering service [phone] or  7 am- 5 pm via Perfect serve or cell phone  Dr.Sudhir John MD       HPI :     Rakan Oakley Jr. is a 75 y.o. male presented to   the

## 2025-06-09 VITALS
SYSTOLIC BLOOD PRESSURE: 134 MMHG | BODY MASS INDEX: 20.17 KG/M2 | HEIGHT: 70 IN | HEART RATE: 86 BPM | RESPIRATION RATE: 18 BRPM | OXYGEN SATURATION: 98 % | DIASTOLIC BLOOD PRESSURE: 87 MMHG | TEMPERATURE: 98.3 F | WEIGHT: 140.87 LBS

## 2025-06-09 LAB
ALBUMIN SERPL-MCNC: 2.7 G/DL (ref 3.4–5)
ANION GAP SERPL CALCULATED.3IONS-SCNC: 11 MMOL/L (ref 3–16)
BASOPHILS # BLD: 0 K/UL (ref 0–0.2)
BASOPHILS NFR BLD: 0.1 %
BUN SERPL-MCNC: 64 MG/DL (ref 7–20)
CALCIUM SERPL-MCNC: 8.4 MG/DL (ref 8.3–10.6)
CHLORIDE SERPL-SCNC: 107 MMOL/L (ref 99–110)
CO2 SERPL-SCNC: 19 MMOL/L (ref 21–32)
CREAT SERPL-MCNC: 2 MG/DL (ref 0.8–1.3)
DEPRECATED RDW RBC AUTO: 11.8 % (ref 12.4–15.4)
EOSINOPHIL # BLD: 0 K/UL (ref 0–0.6)
EOSINOPHIL NFR BLD: 0.1 %
GFR SERPLBLD CREATININE-BSD FMLA CKD-EPI: 34 ML/MIN/{1.73_M2}
GLUCOSE SERPL-MCNC: 163 MG/DL (ref 70–99)
HCT VFR BLD AUTO: 40.4 % (ref 40.5–52.5)
HGB BLD-MCNC: 14.2 G/DL (ref 13.5–17.5)
LYMPHOCYTES # BLD: 1.7 K/UL (ref 1–5.1)
LYMPHOCYTES NFR BLD: 18.4 %
MAGNESIUM SERPL-MCNC: 2.22 MG/DL (ref 1.8–2.4)
MCH RBC QN AUTO: 31.6 PG (ref 26–34)
MCHC RBC AUTO-ENTMCNC: 35.1 G/DL (ref 31–36)
MCV RBC AUTO: 90 FL (ref 80–100)
MONOCYTES # BLD: 1.1 K/UL (ref 0–1.3)
MONOCYTES NFR BLD: 12.1 %
NEUTROPHILS # BLD: 6.2 K/UL (ref 1.7–7.7)
NEUTROPHILS NFR BLD: 69.3 %
PHOSPHATE SERPL-MCNC: 2.3 MG/DL (ref 2.5–4.9)
PLATELET # BLD AUTO: 355 K/UL (ref 135–450)
PMV BLD AUTO: 8.8 FL (ref 5–10.5)
POTASSIUM SERPL-SCNC: 4.3 MMOL/L (ref 3.5–5.1)
RBC # BLD AUTO: 4.48 M/UL (ref 4.2–5.9)
SODIUM SERPL-SCNC: 137 MMOL/L (ref 136–145)
WBC # BLD AUTO: 9 K/UL (ref 4–11)

## 2025-06-09 PROCEDURE — 80069 RENAL FUNCTION PANEL: CPT

## 2025-06-09 PROCEDURE — 6370000000 HC RX 637 (ALT 250 FOR IP)

## 2025-06-09 PROCEDURE — 83735 ASSAY OF MAGNESIUM: CPT

## 2025-06-09 PROCEDURE — 6370000000 HC RX 637 (ALT 250 FOR IP): Performed by: INTERNAL MEDICINE

## 2025-06-09 PROCEDURE — 6360000002 HC RX W HCPCS

## 2025-06-09 PROCEDURE — 36415 COLL VENOUS BLD VENIPUNCTURE: CPT

## 2025-06-09 PROCEDURE — 85025 COMPLETE CBC W/AUTO DIFF WBC: CPT

## 2025-06-09 RX ORDER — NIFEDIPINE 30 MG
60 TABLET, EXTENDED RELEASE ORAL DAILY
Qty: 30 TABLET | Refills: 3
Start: 2025-06-10

## 2025-06-09 RX ORDER — ACETAMINOPHEN 325 MG/1
650 TABLET ORAL EVERY 6 HOURS PRN
Qty: 360 TABLET | Refills: 1
Start: 2025-06-09 | End: 2025-06-27

## 2025-06-09 RX ORDER — OXYCODONE AND ACETAMINOPHEN 5; 325 MG/1; MG/1
1 TABLET ORAL 3 TIMES DAILY PRN
Qty: 9 TABLET | Refills: 0 | Status: SHIPPED | OUTPATIENT
Start: 2025-06-09 | End: 2025-06-12

## 2025-06-09 RX ORDER — DIAZEPAM 5 MG/1
5 TABLET ORAL EVERY 12 HOURS PRN
Qty: 6 TABLET | Refills: 0 | Status: SHIPPED | OUTPATIENT
Start: 2025-06-09 | End: 2025-09-07

## 2025-06-09 RX ORDER — NIFEDIPINE 30 MG
60 TABLET, EXTENDED RELEASE ORAL DAILY
Status: DISCONTINUED | OUTPATIENT
Start: 2025-06-09 | End: 2025-06-09 | Stop reason: HOSPADM

## 2025-06-09 RX ADMIN — METOPROLOL SUCCINATE 50 MG: 50 TABLET, EXTENDED RELEASE ORAL at 09:14

## 2025-06-09 RX ADMIN — OXYCODONE AND ACETAMINOPHEN 1 TABLET: 5; 325 TABLET ORAL at 09:15

## 2025-06-09 RX ADMIN — LOSARTAN POTASSIUM 50 MG: 50 TABLET, FILM COATED ORAL at 09:15

## 2025-06-09 RX ADMIN — OXYCODONE AND ACETAMINOPHEN 1 TABLET: 5; 325 TABLET ORAL at 16:11

## 2025-06-09 RX ADMIN — NIFEDIPINE 60 MG: 30 TABLET, EXTENDED RELEASE ORAL at 09:15

## 2025-06-09 RX ADMIN — HEPARIN SODIUM 5000 UNITS: 5000 INJECTION INTRAVENOUS; SUBCUTANEOUS at 06:07

## 2025-06-09 RX ADMIN — PREDNISONE 20 MG: 20 TABLET ORAL at 09:15

## 2025-06-09 ASSESSMENT — PAIN DESCRIPTION - LOCATION: LOCATION: KNEE

## 2025-06-09 ASSESSMENT — PAIN SCALES - GENERAL
PAINLEVEL_OUTOF10: 5
PAINLEVEL_OUTOF10: 4
PAINLEVEL_OUTOF10: 7

## 2025-06-09 NOTE — PROGRESS NOTES
Ph: [phone], Fax: [phone]           [facility]               Reason for admission:                 Admitted for lightheadedness and dizziness with falls    Brief Summary :     Rakan Oakley Jr. is being seen by nephrology for acute kidney injury on chronic kidney disease.      Interval History and plan:     BP is elevated but is labile   Urine is 1500 ml   Labs reviewed with stable creatinine     Plan:    Increase Nifedipine XL to 60 mg QD  He is on appropriate medication for some degree of CKD  Continue to follow the trend of blood pressure.  He has secondary FSGS with proteinuria                    Assessment :     Chronic kidney disease stage IV: Baseline creatinine is close to 2.5.  Ultrasound of the kidney showed bilateral small size kidneys with echogenicity.  Also has a small stone.    SPEP, UPEP were negative for any M spike.  Hep C was positive and was supposed to see a gastroenterologist.  Echocardiogram showed EF of 50 to 60%.  He has history of hypertension.    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    Hypertension: He is on losartan and metoprolol at home.    Acute kidney injury: On arrival creatinine was 3.6 with a BUN of greater than 80.  This is higher than  his baseline.  It is likely prerenal etiology with poor oral intake, volume depletion.    Metabolic acidosis with urine ketones positive.    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      [facility]phrology would like to thank Clay Morales MD   for opportunity to serve this patient      Please call with questions at-   24 Hrs Answering service [phone] or  7 am- 5 pm via Perfect serve or cell phone  Dr.Muhammad Conrado Logan MD       HPI :     Rakan Oakley Jr. is a 75 y.o. male presented to   the hospital on 6/3/2025 with dizziness and falls.    He has past medical history of chronic kidney disease,

## 2025-06-09 NOTE — PLAN OF CARE
Problem: Discharge Planning  Goal: Discharge to home or other facility with appropriate resources  Outcome: Progressing    Patient will go to SNF for rehab awaiting precertification.     Problem: Pain  Goal: Verbalizes/displays adequate comfort level or baseline comfort level  Outcome: Progressing    Complain of knee pain 9/10, PRN meds given, reposition BLE. Will monitor.        Problem: Safety - Adult  Goal: Free from fall injury  Outcome: Progressing    Bed alarm on, call lights within reach, patient calls appropriately.

## 2025-06-09 NOTE — CARE COORDINATION
Case Management Assessment            Discharge Note                    Date / Time of Note: 6/9/2025 2:40 PM                  Discharge Note Completed by: Annie Howard    Patient Name: Rakan Oakley Jr.   YOB: 1950  Diagnosis: Failure to thrive in adult [R62.7]  Recurrent falls [R29.6]   Date / Time: 6/3/2025  2:33 PM    Current PCP: Logan Chapman MD  Clinic patient: No    Hospitalization in the last 30 days: No       Advance Directives:  Code Status: Full Code  [place] DNR form completed and on chart: Not Indicated    Financial:  Payor: HU[employer] MEDICARE / Plan: HU[employer] GOLD PLUS HMO / Product Type: *No Product type* /      Pharmacy:    [facility] [address] [place] [facility] [address] [phone] - F [phone]  [address]  [facility] [address]  Phone: [phone] Fax: [phone]    [facility] - [facility] [address] [facility] [address] [phone] - F [phone]  [address] [facility] [address]  Phone: [phone] Fax: [phone]      Assistance purchasing medications?:    Assistance provided by Case Management: None at this time    Does patient want to participate in local refill/ meds to beds program?: Yes    Meds To Beds General Rules:  1. Can ONLY be done Monday- Friday between 8:30am-5pm  2. Prescription(s) must be in pharmacy by 3pm to be filled same day  3.Copy of patient's insurance/ prescription drug card and patient face sheet must be sent along with the prescription(s)  4. Cost of Rx cannot be added to hospital bill. If financial assistance is needed, please contact unit  or ;  or  CANNOT provide pharmacy voucher for patients co-pays  5. Patients can then  the prescription on their way out of the hospital at discharge, or pharmacy can deliver to the bedside if staff is available. (payment due at time of pick-up or delivery - cash, check, or card accepted)     Able to afford  home medications/ co-pay costs: Yes    ADLS:  Current PT AM-PAC Score: 10 /24  Current OT AM-PAC Score: 14 /24    DISCHARGE Disposition: Skilled Nursing Facility (SNF): [facility] Phone: [phone] Fax: [phone]    LOC at discharge: Skilled  TOYA Completed: Yes    Notification completed in HENS/PAS?:  Yes : CM has completed HENS online through secure website for SNF admission at [facility].   Document ID #:   [id number]     IMM Completed:   Yes, Case management has presented and reviewed IMM letter #2 to the patient and/or family/ POA. Patient and/or family/POA verbalized understanding of their medicare rights and appeal process if needed. Patient and/or family/POA verbalized understanding of DC within 4 hours of signing IMM letter #2. Patient and/or family/POA has signed and placed today's date (6/9) and time (1430) on IMM letter #2 on the the appropriate lines. Patient and/or family/POA, provided copy of letter and they are aware that original copy of IMM letter #2 is available prior to discharge from the paper chart on the unit.  Electronic documentation has been entered into epic for IMM letter #2 and original paper copy has been added to the paper chart at the nurses station.     Presentation of IMM to be given within 4 hours of discharge. Patient understands and agrees with discharge plan.       Transportation:  Transportation PLAN for discharge: EMS transportation   Mode of Transport: Ambulance stretcher - S  Reason for medical transport: Bed confined: Meets the following criteria 1) unable to get out of bed without assistance or ambulate, 2) unable to safely sit up in a wheelchair, 3) unable to maintain erect seating position in a chair for time needed for transport  Name of Transport Company: [facility]  Phone: [phone]  Time of Transport: 1600    Transport form completed: Yes      Additional CM Notes: Patient cleared for discharge.  Patient is A&OX4.  Therapy recommending SNF.

## 2025-06-13 NOTE — PROGRESS NOTES
Physician Progress Note      PATIENT:               HUMBERTO COELLO  CSN #:                  [id number]  :                       1950  ADMIT DATE:       6/3/2025 2:33 PM  DISCH DATE:        2025 5:08 PM  RESPONDING  PROVIDER #:        ELBA TAVERAS          QUERY TEXT:    \"Acute encephalopathy  Evaluated the patient after he received Dilaudid 0.5 mg   which may have altered my exam. Does take valium 5mg BID which may have   contributed. Multiple possible etiologies including uremia given new SURY on   CKD, pain, overall cachexia\" documented in H&P and attending progress note   from -.  Please specify type of encephalopathy:    The clinical indicators include:  - Admitted with acute gout flare affecting bilateral knees  - ER documented \"He is alert and oriented to person, place, and time.\"  - Per nursing flowsheet, consistently oriented to person, situation, and   place, but disoriented to time throughout admission  - \"Acute encephalopathy  Evaluated the patient after he received Dilaudid 0.5   mg which may have altered my exam. Does take valium 5mg BID which may have   contributed. Multiple possible etiologies including uremia given new SURY on   CKD, pain, overall cachexia\" documented in HH&P and attending progress note   from -  Options provided:  -- Acute encephalopathy ruled out  -- Acute metabolic encephalopathy due to SURY  -- Acute encephalopathy due to Dilaudid and Valium  -- Other - I will add my own diagnosis  -- Disagree - Not applicable / Not valid  -- Disagree - Clinically unable to determine / Unknown  -- Refer to Clinical Documentation Reviewer    PROVIDER RESPONSE TEXT:    Provider disagreed with this query.  The text you are questioning is a quotation from the original H&P, which was   not written by me.    Query created by: Kasia Meneses on 2025 10:29 AM      Electronically signed by:  ELBA TAVERAS 2025 10:58 AM

## 2025-06-21 DIAGNOSIS — I10 ESSENTIAL (PRIMARY) HYPERTENSION: ICD-10-CM

## 2025-06-23 RX ORDER — METOPROLOL SUCCINATE 50 MG/1
50 TABLET, EXTENDED RELEASE ORAL DAILY
Qty: 90 TABLET | Refills: 1 | Status: SHIPPED | OUTPATIENT
Start: 2025-06-23

## 2025-06-27 ENCOUNTER — OFFICE VISIT (OUTPATIENT)
Dept: INTERNAL MEDICINE CLINIC | Age: 75
End: 2025-06-27
Payer: MEDICARE

## 2025-06-27 VITALS
DIASTOLIC BLOOD PRESSURE: 70 MMHG | BODY MASS INDEX: 19.85 KG/M2 | OXYGEN SATURATION: 97 % | WEIGHT: 131 LBS | HEART RATE: 96 BPM | SYSTOLIC BLOOD PRESSURE: 120 MMHG | HEIGHT: 68 IN

## 2025-06-27 DIAGNOSIS — Z76.89 ENCOUNTER TO ESTABLISH CARE: ICD-10-CM

## 2025-06-27 DIAGNOSIS — M25.551 CHRONIC BILATERAL HIP PAIN AFTER TOTAL REPLACEMENT OF BOTH HIP JOINTS: ICD-10-CM

## 2025-06-27 DIAGNOSIS — M25.552 CHRONIC BILATERAL HIP PAIN AFTER TOTAL REPLACEMENT OF BOTH HIP JOINTS: ICD-10-CM

## 2025-06-27 DIAGNOSIS — G89.28 CHRONIC BILATERAL HIP PAIN AFTER TOTAL REPLACEMENT OF BOTH HIP JOINTS: ICD-10-CM

## 2025-06-27 DIAGNOSIS — E03.8 SUBCLINICAL HYPOTHYROIDISM: ICD-10-CM

## 2025-06-27 DIAGNOSIS — Z96.643 CHRONIC BILATERAL HIP PAIN AFTER TOTAL REPLACEMENT OF BOTH HIP JOINTS: ICD-10-CM

## 2025-06-27 DIAGNOSIS — M17.0 PRIMARY OSTEOARTHRITIS OF BOTH KNEES: Primary | ICD-10-CM

## 2025-06-27 DIAGNOSIS — B18.2 HEP C W/O COMA, CHRONIC (HCC): ICD-10-CM

## 2025-06-27 PROCEDURE — 1111F DSCHRG MED/CURRENT MED MERGE: CPT | Performed by: STUDENT IN AN ORGANIZED HEALTH CARE EDUCATION/TRAINING PROGRAM

## 2025-06-27 PROCEDURE — 99215 OFFICE O/P EST HI 40 MIN: CPT | Performed by: STUDENT IN AN ORGANIZED HEALTH CARE EDUCATION/TRAINING PROGRAM

## 2025-06-27 PROCEDURE — 1123F ACP DISCUSS/DSCN MKR DOCD: CPT | Performed by: STUDENT IN AN ORGANIZED HEALTH CARE EDUCATION/TRAINING PROGRAM

## 2025-06-27 PROCEDURE — 1036F TOBACCO NON-USER: CPT | Performed by: STUDENT IN AN ORGANIZED HEALTH CARE EDUCATION/TRAINING PROGRAM

## 2025-06-27 PROCEDURE — G8427 DOCREV CUR MEDS BY ELIG CLIN: HCPCS | Performed by: STUDENT IN AN ORGANIZED HEALTH CARE EDUCATION/TRAINING PROGRAM

## 2025-06-27 PROCEDURE — G8420 CALC BMI NORM PARAMETERS: HCPCS | Performed by: STUDENT IN AN ORGANIZED HEALTH CARE EDUCATION/TRAINING PROGRAM

## 2025-06-27 PROCEDURE — 3078F DIAST BP <80 MM HG: CPT | Performed by: STUDENT IN AN ORGANIZED HEALTH CARE EDUCATION/TRAINING PROGRAM

## 2025-06-27 PROCEDURE — G2211 COMPLEX E/M VISIT ADD ON: HCPCS | Performed by: STUDENT IN AN ORGANIZED HEALTH CARE EDUCATION/TRAINING PROGRAM

## 2025-06-27 PROCEDURE — 1159F MED LIST DOCD IN RCRD: CPT | Performed by: STUDENT IN AN ORGANIZED HEALTH CARE EDUCATION/TRAINING PROGRAM

## 2025-06-27 PROCEDURE — 3074F SYST BP LT 130 MM HG: CPT | Performed by: STUDENT IN AN ORGANIZED HEALTH CARE EDUCATION/TRAINING PROGRAM

## 2025-06-27 RX ORDER — ACETAMINOPHEN 500 MG
1000 TABLET ORAL 3 TIMES DAILY PRN
Qty: 180 TABLET | Refills: 0 | Status: SHIPPED | OUTPATIENT
Start: 2025-06-27

## 2025-06-27 RX ORDER — COLCHICINE 0.6 MG/1
0.6 TABLET ORAL DAILY
Qty: 15 TABLET | Refills: 0 | Status: SHIPPED | OUTPATIENT
Start: 2025-06-27 | End: 2025-06-27

## 2025-06-27 RX ORDER — LIDOCAINE 4 G/G
1 PATCH TOPICAL DAILY
Qty: 30 PATCH | Refills: 0 | Status: SHIPPED | OUTPATIENT
Start: 2025-06-27 | End: 2025-07-27

## 2025-06-27 RX ORDER — COLCHICINE 0.6 MG/1
0.3 TABLET ORAL
Qty: 2 TABLET | Refills: 0 | Status: SHIPPED | OUTPATIENT
Start: 2025-06-27 | End: 2025-07-07

## 2025-06-27 ASSESSMENT — PATIENT HEALTH QUESTIONNAIRE - PHQ9
9. THOUGHTS THAT YOU WOULD BE BETTER OFF DEAD, OR OF HURTING YOURSELF: NOT AT ALL
4. FEELING TIRED OR HAVING LITTLE ENERGY: NOT AT ALL
10. IF YOU CHECKED OFF ANY PROBLEMS, HOW DIFFICULT HAVE THESE PROBLEMS MADE IT FOR YOU TO DO YOUR WORK, TAKE CARE OF THINGS AT HOME, OR GET ALONG WITH OTHER PEOPLE: NOT DIFFICULT AT ALL
1. LITTLE INTEREST OR PLEASURE IN DOING THINGS: NOT AT ALL
6. FEELING BAD ABOUT YOURSELF - OR THAT YOU ARE A FAILURE OR HAVE LET YOURSELF OR YOUR FAMILY DOWN: NOT AT ALL
SUM OF ALL RESPONSES TO PHQ QUESTIONS 1-9: 0
5. POOR APPETITE OR OVEREATING: NOT AT ALL
SUM OF ALL RESPONSES TO PHQ QUESTIONS 1-9: 0
7. TROUBLE CONCENTRATING ON THINGS, SUCH AS READING THE NEWSPAPER OR WATCHING TELEVISION: NOT AT ALL
8. MOVING OR SPEAKING SO SLOWLY THAT OTHER PEOPLE COULD HAVE NOTICED. OR THE OPPOSITE, BEING SO FIGETY OR RESTLESS THAT YOU HAVE BEEN MOVING AROUND A LOT MORE THAN USUAL: NOT AT ALL
SUM OF ALL RESPONSES TO PHQ QUESTIONS 1-9: 0
3. TROUBLE FALLING OR STAYING ASLEEP: NOT AT ALL
SUM OF ALL RESPONSES TO PHQ QUESTIONS 1-9: 0
2. FEELING DOWN, DEPRESSED OR HOPELESS: NOT AT ALL

## 2025-06-27 NOTE — ASSESSMENT & PLAN NOTE
Chronic, not at goal (unstable), discussed role of diclofenac gel, lidocaine patch, Tylenol 1 g 3 times daily.  In addition given review of recent hospital note noting concern for gout, also treating for gout flare with colchicine as steroid provided temporary relief but was incomplete.  Reviewed patient's GFR with up-to-date recommendation as well as reviewed his current medications prior to providing prescription.     Orders:    acetaminophen (TYLENOL) 500 MG tablet; Take 2 tablets by mouth 3 times daily as needed for Pain    John Jauregui MD, Orthopedic Surgery (Hip; Knee; Shoulder), [facility]    lidocaine 4 % external patch; Place 1 patch onto the skin daily    diclofenac sodium (VOLTAREN) 1 % GEL; Apply 4 g topically 4 times daily    Fernandez Mancilla MD, Pain Management, [facility]    colchicine (COLCRYS) 0.6 MG tablet; Take 0.5 tablets by mouth every 72 hours for 4 doses

## 2025-06-27 NOTE — PROGRESS NOTES
Rakan Oakley Jr. (:  1950) is a 75 y.o. male,New patient, here for evaluation of the following chief complaint(s):  New Patient ([facility]), Arthritis, and Gout         Assessment & Plan  Primary osteoarthritis of both knees   Chronic, not at goal (unstable), discussed role of diclofenac gel, lidocaine patch, Tylenol 1 g 3 times daily.  In addition given review of recent hospital note noting concern for gout, also treating for gout flare with colchicine as steroid provided temporary relief but was incomplete.  Reviewed patient's GFR with up-to-date recommendation as well as reviewed his current medications prior to providing prescription.   In addition we will refer to orthopedic surgery and pain management.  Long-term plan uncertain but first-line therapies provided per above and will appreciate recommendations of specialists.    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  In addition given review of recent hospital note noting concern for gout, also treating for gout flare with colchicine as steroid provided temporary relief but was incomplete.  Reviewed patient's GFR with up-to-date recommendation as well as reviewed his current medications prior to providing prescription.     Orders:    acetaminophen (TYLENOL) 500 MG tablet; Take 2 tablets by mouth 3 times daily as

## 2025-06-27 NOTE — ASSESSMENT & PLAN NOTE
Chronic, not at goal (unstable), discussed role of diclofenac gel, lidocaine patch, Tylenol 1 g 3 times daily.  In addition given review of recent hospital note noting concern for gout, also treating for gout flare with colchicine as steroid provided temporary relief but was incomplete.  Reviewed patient's GFR with up-to-date recommendation as well as reviewed his current medications prior to providing prescription.   In addition we will refer to orthopedic surgery and pain management.  Long-term plan uncertain but first-line therapies provided per above and will appreciate recommendations of specialists.    Orders:    acetaminophen (TYLENOL) 500 MG tablet; Take 2 tablets by mouth 3 times daily as needed for Pain    John Jauregui MD, Orthopedic Surgery (Hip; Knee; Shoulder), [place]    lidocaine 4 % external patch; Place 1 patch onto the skin daily    diclofenac sodium (VOLTAREN) 1 % GEL; Apply 4 g topically 4 times daily    Fernandez Mancilla MD, Pain Management, [place]    colchicine (COLCRYS) 0.6 MG tablet; Take 0.5 tablets by mouth every 72 hours for 4 doses

## 2025-06-27 NOTE — ASSESSMENT & PLAN NOTE
Noted on review of most recent labs from earlier this month.  Will continue to monitor and further assess.  Patient notes chronic cold intolerance and mild fatigue but otherwise asymptomatic.

## 2025-06-27 NOTE — ASSESSMENT & PLAN NOTE
Chronic, not at goal (unstable), currently untreated with positive viral load in hospital.  Will refer to GI for treatment.    Orders:    BELLE - Rudy Rodriguez MD, Gastroenterology, [place]

## 2025-07-17 ENCOUNTER — TELEPHONE (OUTPATIENT)
Dept: INTERNAL MEDICINE CLINIC | Age: 75
End: 2025-07-17

## 2025-07-17 NOTE — TELEPHONE ENCOUNTER
Had a fall this morning he is in a lot of pain hasn't had any pain meds in a week .Mandi Rosado [phone] please advise

## 2025-07-18 ENCOUNTER — TELEPHONE (OUTPATIENT)
Dept: INTERNAL MEDICINE CLINIC | Age: 75
End: 2025-07-18

## 2025-07-18 DIAGNOSIS — M10.9 ACUTE GOUT OF KNEE, UNSPECIFIED CAUSE, UNSPECIFIED LATERALITY: Primary | ICD-10-CM

## 2025-07-18 DIAGNOSIS — M17.0 PRIMARY OSTEOARTHRITIS OF BOTH KNEES: ICD-10-CM

## 2025-07-18 RX ORDER — METHYLPREDNISOLONE 4 MG/1
TABLET ORAL
Qty: 21 TABLET | Refills: 0 | Status: SHIPPED | OUTPATIENT
Start: 2025-07-18 | End: 2025-07-24

## 2025-07-18 NOTE — TELEPHONE ENCOUNTER
PT WAS PRESCRIBED STEROIDS BUT SISTER CALLED TO SAY THAT HE JUST FINISHED STEROIDS AND HE NEEDS MEDICINE FOR PAIN HE IS IN SO MUCH PAIN HE CAN'T STAND UP SHE CAN'T EVEN GET HIM IN THE CAR TO GET HIM AN APPOINTMENT   Mandi Rosado [phone]. Please Advise.

## 2025-07-21 NOTE — TELEPHONE ENCOUNTER
Spoke with pt sister states pt can't walk that why she ask for the pain medication. Swollen in knees and feet so that why she can't get him there

## 2025-08-19 DIAGNOSIS — M25.552 CHRONIC BILATERAL HIP PAIN AFTER TOTAL REPLACEMENT OF BOTH HIP JOINTS: ICD-10-CM

## 2025-08-19 DIAGNOSIS — Z96.643 CHRONIC BILATERAL HIP PAIN AFTER TOTAL REPLACEMENT OF BOTH HIP JOINTS: ICD-10-CM

## 2025-08-19 DIAGNOSIS — M17.0 PRIMARY OSTEOARTHRITIS OF BOTH KNEES: Primary | ICD-10-CM

## 2025-08-19 DIAGNOSIS — G89.28 CHRONIC BILATERAL HIP PAIN AFTER TOTAL REPLACEMENT OF BOTH HIP JOINTS: ICD-10-CM

## 2025-08-19 DIAGNOSIS — M25.551 CHRONIC BILATERAL HIP PAIN AFTER TOTAL REPLACEMENT OF BOTH HIP JOINTS: ICD-10-CM
